# Patient Record
Sex: FEMALE | Race: WHITE | NOT HISPANIC OR LATINO | Employment: FULL TIME | URBAN - METROPOLITAN AREA
[De-identification: names, ages, dates, MRNs, and addresses within clinical notes are randomized per-mention and may not be internally consistent; named-entity substitution may affect disease eponyms.]

---

## 2019-03-26 ENCOUNTER — TRANSCRIBE ORDERS (OUTPATIENT)
Dept: ADMINISTRATIVE | Age: 24
End: 2019-03-26

## 2019-03-26 ENCOUNTER — OFFICE VISIT (OUTPATIENT)
Dept: OBGYN CLINIC | Facility: CLINIC | Age: 24
End: 2019-03-26
Payer: COMMERCIAL

## 2019-03-26 ENCOUNTER — APPOINTMENT (OUTPATIENT)
Dept: LAB | Age: 24
End: 2019-03-26
Payer: COMMERCIAL

## 2019-03-26 VITALS — BODY MASS INDEX: 30.22 KG/M2 | HEIGHT: 66 IN | WEIGHT: 188 LBS

## 2019-03-26 DIAGNOSIS — R19.7 DIARRHEA, UNSPECIFIED TYPE: Primary | ICD-10-CM

## 2019-03-26 DIAGNOSIS — K64.4 EXTERNAL HEMORRHOID: ICD-10-CM

## 2019-03-26 DIAGNOSIS — Z01.419 ENCOUNTER FOR WELL WOMAN EXAM: Primary | ICD-10-CM

## 2019-03-26 DIAGNOSIS — R19.5 CHANGE IN STOOL: ICD-10-CM

## 2019-03-26 DIAGNOSIS — R10.84 ABDOMINAL PAIN, GENERALIZED: ICD-10-CM

## 2019-03-26 DIAGNOSIS — Z11.3 SCREEN FOR STD (SEXUALLY TRANSMITTED DISEASE): ICD-10-CM

## 2019-03-26 DIAGNOSIS — Z01.419 CERVICAL SMEAR, AS PART OF ROUTINE GYNECOLOGICAL EXAMINATION: ICD-10-CM

## 2019-03-26 DIAGNOSIS — Z11.8 SPECIAL SCREENING EXAMINATION FOR CHLAMYDIAL DISEASE: ICD-10-CM

## 2019-03-26 DIAGNOSIS — R19.7 DIARRHEA, UNSPECIFIED TYPE: ICD-10-CM

## 2019-03-26 LAB
ALBUMIN SERPL BCP-MCNC: 3.1 G/DL (ref 3.5–5)
ALP SERPL-CCNC: 73 U/L (ref 46–116)
ALT SERPL W P-5'-P-CCNC: 16 U/L (ref 12–78)
ANION GAP SERPL CALCULATED.3IONS-SCNC: 6 MMOL/L (ref 4–13)
AST SERPL W P-5'-P-CCNC: 11 U/L (ref 5–45)
BASOPHILS # BLD AUTO: 0.06 THOUSANDS/ΜL (ref 0–0.1)
BASOPHILS NFR BLD AUTO: 1 % (ref 0–1)
BILIRUB SERPL-MCNC: 0.22 MG/DL (ref 0.2–1)
BUN SERPL-MCNC: 9 MG/DL (ref 5–25)
CALCIUM SERPL-MCNC: 8.3 MG/DL (ref 8.3–10.1)
CHLORIDE SERPL-SCNC: 109 MMOL/L (ref 100–108)
CO2 SERPL-SCNC: 25 MMOL/L (ref 21–32)
CREAT SERPL-MCNC: 0.93 MG/DL (ref 0.6–1.3)
EOSINOPHIL # BLD AUTO: 0.34 THOUSAND/ΜL (ref 0–0.61)
EOSINOPHIL NFR BLD AUTO: 4 % (ref 0–6)
ERYTHROCYTE [DISTWIDTH] IN BLOOD BY AUTOMATED COUNT: 12.9 % (ref 11.6–15.1)
GFR SERPL CREATININE-BSD FRML MDRD: 88 ML/MIN/1.73SQ M
GLUCOSE SERPL-MCNC: 116 MG/DL (ref 65–140)
HCT VFR BLD AUTO: 35.2 % (ref 34.8–46.1)
HGB BLD-MCNC: 11.7 G/DL (ref 11.5–15.4)
IMM GRANULOCYTES # BLD AUTO: 0.03 THOUSAND/UL (ref 0–0.2)
IMM GRANULOCYTES NFR BLD AUTO: 0 % (ref 0–2)
LYMPHOCYTES # BLD AUTO: 2.62 THOUSANDS/ΜL (ref 0.6–4.47)
LYMPHOCYTES NFR BLD AUTO: 33 % (ref 14–44)
MCH RBC QN AUTO: 28.7 PG (ref 26.8–34.3)
MCHC RBC AUTO-ENTMCNC: 33.2 G/DL (ref 31.4–37.4)
MCV RBC AUTO: 86 FL (ref 82–98)
MONOCYTES # BLD AUTO: 0.71 THOUSAND/ΜL (ref 0.17–1.22)
MONOCYTES NFR BLD AUTO: 9 % (ref 4–12)
NEUTROPHILS # BLD AUTO: 4.24 THOUSANDS/ΜL (ref 1.85–7.62)
NEUTS SEG NFR BLD AUTO: 53 % (ref 43–75)
NRBC BLD AUTO-RTO: 0 /100 WBCS
PLATELET # BLD AUTO: 386 THOUSANDS/UL (ref 149–390)
PMV BLD AUTO: 10 FL (ref 8.9–12.7)
POTASSIUM SERPL-SCNC: 3.9 MMOL/L (ref 3.5–5.3)
PROT SERPL-MCNC: 7.5 G/DL (ref 6.4–8.2)
RBC # BLD AUTO: 4.08 MILLION/UL (ref 3.81–5.12)
SODIUM SERPL-SCNC: 140 MMOL/L (ref 136–145)
TSH SERPL DL<=0.05 MIU/L-ACNC: 1.44 UIU/ML (ref 0.36–3.74)
WBC # BLD AUTO: 8 THOUSAND/UL (ref 4.31–10.16)

## 2019-03-26 PROCEDURE — 85025 COMPLETE CBC W/AUTO DIFF WBC: CPT

## 2019-03-26 PROCEDURE — 80053 COMPREHEN METABOLIC PANEL: CPT

## 2019-03-26 PROCEDURE — S0610 ANNUAL GYNECOLOGICAL EXAMINA: HCPCS | Performed by: PHYSICIAN ASSISTANT

## 2019-03-26 PROCEDURE — 36415 COLL VENOUS BLD VENIPUNCTURE: CPT

## 2019-03-26 PROCEDURE — 84443 ASSAY THYROID STIM HORMONE: CPT

## 2019-03-26 RX ORDER — DROSPIRENONE AND ETHINYL ESTRADIOL 0.02-3(28)
1 KIT ORAL DAILY
COMMUNITY
End: 2020-06-17 | Stop reason: HOSPADM

## 2019-03-26 NOTE — PROGRESS NOTES
Assessment/Plan:    No problem-specific Assessment & Plan notes found for this encounter  Diagnoses and all orders for this visit:    Encounter for well woman exam    Cervical smear, as part of routine gynecological examination  -     GP PAP (RFLX HPV PLUS WHEN ASCUS)    Screen for STD (sexually transmitted disease)  -     GP Chlamydia + Gonorrhea, Liquid-Based    Special screening examination for chlamydial disease  -     GP Chlamydia + Gonorrhea, Liquid-Based    External hemorrhoid    Change in stool    Other orders  -     drospirenone-ethinyl estradiol (OTTONIEL) 3-0 02 MG per tablet; Take 1 tablet by mouth daily        [facility]      Subjective:      Patient ID: Renée Martin is a 26 y o  female  Pt presents today for her yearly exam--  Her periods are regular, short and light on OCP  For the last 4--5 weeks, pt reports daily loose, stools, occasionally with blood  She denies any abdominal pain  She also denies any rectal pain or itching  She has not changed her diet  She has not been sick  No new medicines or vitamins  No fever/chills  She does report some increased stress with starting a new job  She reported no change in weight, but was 226lbs at her yearly OV last year  rec she start with a PCP for BW and imaging--possibly to GI from there  The following portions of the patient's history were reviewed and updated as appropriate: allergies, current medications, past family history, past medical history, past social history, past surgical history and problem list     Review of Systems   Constitutional: Negative for chills, fever and unexpected weight change  Gastrointestinal: Positive for blood in stool and diarrhea  Negative for constipation, nausea, rectal pain and vomiting  Genitourinary: Negative            Objective:      Ht 5' 6" (1 676 m)   Wt 85 3 kg (188 lb)   LMP 03/06/2019 (Exact Date)   BMI 30 34 kg/m²          Physical Exam   Constitutional: She appears well-developed and well-nourished  HENT:   Head: Normocephalic and atraumatic  Neck: Normal range of motion  Pulmonary/Chest: Right breast exhibits no inverted nipple, no mass, no nipple discharge and no skin change  Left breast exhibits no inverted nipple, no mass, no nipple discharge and no skin change  Abdominal: Soft  Genitourinary: Vagina normal and uterus normal  Rectal exam shows external hemorrhoid  Pelvic exam was performed with patient supine  There is no rash, tenderness or lesion on the right labia  There is no rash, tenderness or lesion on the left labia  Cervix exhibits no motion tenderness, no discharge and no friability  Right adnexum displays no mass, no tenderness and no fullness  Left adnexum displays no mass, no tenderness and no fullness  Lymphadenopathy: No inguinal adenopathy noted on the right or left side  Nursing note and vitals reviewed

## 2019-03-26 NOTE — PROGRESS NOTES
Pt is here for yearly exam  Pt has had some loose stool, with blood for the past 4 weeks  Pt doing well on generic Jesenia, regular cycles  Pt has no breast concerns  normal pap 2/13/18

## 2019-03-27 ENCOUNTER — APPOINTMENT (OUTPATIENT)
Dept: LAB | Age: 24
End: 2019-03-27
Payer: COMMERCIAL

## 2019-03-27 DIAGNOSIS — R19.7 DIARRHEA, UNSPECIFIED TYPE: ICD-10-CM

## 2019-03-27 DIAGNOSIS — R10.84 ABDOMINAL PAIN, GENERALIZED: ICD-10-CM

## 2019-03-27 PROCEDURE — 89055 LEUKOCYTE ASSESSMENT FECAL: CPT

## 2019-03-27 PROCEDURE — 87209 SMEAR COMPLEX STAIN: CPT

## 2019-03-27 PROCEDURE — 87177 OVA AND PARASITES SMEARS: CPT

## 2019-03-29 LAB — WBC SPEC QL GRAM STN: ABNORMAL

## 2019-04-01 LAB
DEPRECATED C TRACH RRNA XXX QL PRB: NOT DETECTED
N GONORRHOEA DNA UR QL NAA+PROBE: NOT DETECTED
O+P STL CONC: NORMAL
THIN PREP CVX: NORMAL

## 2019-04-02 ENCOUNTER — APPOINTMENT (OUTPATIENT)
Dept: LAB | Age: 24
End: 2019-04-02
Payer: COMMERCIAL

## 2019-04-02 DIAGNOSIS — R10.84 ABDOMINAL PAIN, GENERALIZED: ICD-10-CM

## 2019-04-02 DIAGNOSIS — R19.7 DIARRHEA, UNSPECIFIED TYPE: ICD-10-CM

## 2019-04-02 PROCEDURE — 87505 NFCT AGENT DETECTION GI: CPT

## 2019-04-03 LAB
CAMPYLOBACTER DNA SPEC NAA+PROBE: NORMAL
SALMONELLA DNA SPEC QL NAA+PROBE: NORMAL
SHIGA TOXIN STX GENE SPEC NAA+PROBE: NORMAL
SHIGELLA DNA SPEC QL NAA+PROBE: NORMAL

## 2019-04-22 ENCOUNTER — TRANSCRIBE ORDERS (OUTPATIENT)
Dept: ADMINISTRATIVE | Facility: HOSPITAL | Age: 24
End: 2019-04-22

## 2019-04-22 DIAGNOSIS — K52.9 CHRONIC DIARRHEA: Primary | ICD-10-CM

## 2019-05-03 DIAGNOSIS — Z30.9 CONTRACEPTION MANAGEMENT: Primary | ICD-10-CM

## 2019-05-03 RX ORDER — DROSPIRENONE AND ETHINYL ESTRADIOL 0.02-3(28)
1 KIT ORAL DAILY
Qty: 90 TABLET | Refills: 2 | Status: SHIPPED | OUTPATIENT
Start: 2019-05-03 | End: 2020-01-14 | Stop reason: SDUPTHER

## 2019-05-31 ENCOUNTER — TRANSCRIBE ORDERS (OUTPATIENT)
Dept: ADMINISTRATIVE | Facility: HOSPITAL | Age: 24
End: 2019-05-31

## 2019-05-31 ENCOUNTER — HOSPITAL ENCOUNTER (OUTPATIENT)
Dept: RADIOLOGY | Facility: HOSPITAL | Age: 24
Discharge: HOME | End: 2019-05-31
Attending: INTERNAL MEDICINE

## 2019-05-31 DIAGNOSIS — K52.9 CHRONIC DIARRHEA: ICD-10-CM

## 2019-06-12 ENCOUNTER — HOSPITAL ENCOUNTER (OUTPATIENT)
Dept: RADIOLOGY | Facility: HOSPITAL | Age: 24
Discharge: HOME | End: 2019-06-12
Attending: INTERNAL MEDICINE
Payer: COMMERCIAL

## 2019-06-12 DIAGNOSIS — K52.9 CHRONIC DIARRHEA: ICD-10-CM

## 2019-06-12 PROCEDURE — 74250 X-RAY XM SM INT 1CNTRST STD: CPT

## 2020-01-14 DIAGNOSIS — Z30.9 CONTRACEPTION MANAGEMENT: ICD-10-CM

## 2020-01-15 RX ORDER — DROSPIRENONE AND ETHINYL ESTRADIOL 0.02-3(28)
1 KIT ORAL DAILY
Qty: 90 TABLET | Refills: 0 | Status: SHIPPED | OUTPATIENT
Start: 2020-01-15 | End: 2020-06-17 | Stop reason: HOSPADM

## 2020-03-23 DIAGNOSIS — Z30.41 SURVEILLANCE FOR BIRTH CONTROL, ORAL CONTRACEPTIVES: Primary | ICD-10-CM

## 2020-03-23 RX ORDER — DROSPIRENONE AND ETHINYL ESTRADIOL 0.02-3(28)
1 KIT ORAL DAILY
Qty: 90 TABLET | Refills: 0 | Status: SHIPPED | OUTPATIENT
Start: 2020-03-23 | End: 2020-10-19 | Stop reason: SDUPTHER

## 2020-06-12 ENCOUNTER — APPOINTMENT (EMERGENCY)
Dept: CT IMAGING | Facility: HOSPITAL | Age: 25
DRG: 386 | End: 2020-06-12
Payer: COMMERCIAL

## 2020-06-12 ENCOUNTER — HOSPITAL ENCOUNTER (INPATIENT)
Facility: HOSPITAL | Age: 25
LOS: 5 days | Discharge: HOME | DRG: 386 | End: 2020-06-17
Attending: EMERGENCY MEDICINE | Admitting: INTERNAL MEDICINE
Payer: COMMERCIAL

## 2020-06-12 DIAGNOSIS — E87.1 HYPONATREMIA: ICD-10-CM

## 2020-06-12 DIAGNOSIS — D64.9 ANEMIA: ICD-10-CM

## 2020-06-12 DIAGNOSIS — R01.1 MURMUR: ICD-10-CM

## 2020-06-12 DIAGNOSIS — K50.119 CROHN'S DISEASE OF COLON WITH COMPLICATION (HCC): ICD-10-CM

## 2020-06-12 DIAGNOSIS — K52.9 ACUTE COLITIS: Primary | ICD-10-CM

## 2020-06-12 PROBLEM — D72.829 LEUCOCYTOSIS: Status: ACTIVE | Noted: 2020-06-12

## 2020-06-12 LAB
ALBUMIN SERPL BCP-MCNC: 2.5 G/DL (ref 3.5–5)
ALP SERPL-CCNC: 118 U/L (ref 46–116)
ALT SERPL W P-5'-P-CCNC: 16 U/L (ref 12–78)
ANION GAP SERPL CALCULATED.3IONS-SCNC: 10 MMOL/L (ref 4–13)
APTT PPP: 32 SECONDS (ref 23–37)
AST SERPL W P-5'-P-CCNC: 7 U/L (ref 5–45)
BASOPHILS # BLD MANUAL: 0 THOUSAND/UL (ref 0–0.1)
BASOPHILS NFR MAR MANUAL: 0 % (ref 0–1)
BILIRUB SERPL-MCNC: 0.4 MG/DL (ref 0.2–1)
BUN SERPL-MCNC: 9 MG/DL (ref 5–25)
CALCIUM SERPL-MCNC: 8.5 MG/DL (ref 8.3–10.1)
CHLORIDE SERPL-SCNC: 93 MMOL/L (ref 100–108)
CO2 SERPL-SCNC: 22 MMOL/L (ref 21–32)
CREAT SERPL-MCNC: 0.99 MG/DL (ref 0.6–1.3)
CRP SERPL QL: 244.8 MG/L
EOSINOPHIL # BLD MANUAL: 0 THOUSAND/UL (ref 0–0.4)
EOSINOPHIL NFR BLD MANUAL: 0 % (ref 0–6)
ERYTHROCYTE [DISTWIDTH] IN BLOOD BY AUTOMATED COUNT: 15.7 % (ref 11.6–15.1)
EXT PREG TEST URINE: NEGATIVE
EXT. CONTROL ED NAV: NORMAL
FERRITIN SERPL-MCNC: 111 NG/ML (ref 8–388)
FOLATE SERPL-MCNC: 11.1 NG/ML (ref 3.1–17.5)
GFR SERPL CREATININE-BSD FRML MDRD: 80 ML/MIN/1.73SQ M
GLUCOSE SERPL-MCNC: 121 MG/DL (ref 65–140)
HCT VFR BLD AUTO: 33.2 % (ref 34.8–46.1)
HGB BLD-MCNC: 10.4 G/DL (ref 11.5–15.4)
INR PPP: 1.28 (ref 0.84–1.19)
IRON SATN MFR SERPL: 7 %
IRON SERPL-MCNC: 18 UG/DL (ref 50–170)
LIPASE SERPL-CCNC: 651 U/L (ref 73–393)
LYMPHOCYTES # BLD AUTO: 1.99 THOUSAND/UL (ref 0.6–4.47)
LYMPHOCYTES # BLD AUTO: 17 % (ref 14–44)
MCH RBC QN AUTO: 21.1 PG (ref 26.8–34.3)
MCHC RBC AUTO-ENTMCNC: 31.3 G/DL (ref 31.4–37.4)
MCV RBC AUTO: 67 FL (ref 82–98)
METAMYELOCYTES NFR BLD MANUAL: 1 % (ref 0–1)
MONOCYTES # BLD AUTO: 0.7 THOUSAND/UL (ref 0–1.22)
MONOCYTES NFR BLD: 6 % (ref 4–12)
NEUTROPHILS # BLD MANUAL: 8.88 THOUSAND/UL (ref 1.85–7.62)
NEUTS SEG NFR BLD AUTO: 76 % (ref 43–75)
NRBC BLD AUTO-RTO: 0 /100 WBCS
PLATELET # BLD AUTO: 525 THOUSANDS/UL (ref 149–390)
PLATELET BLD QL SMEAR: ABNORMAL
PMV BLD AUTO: 8.8 FL (ref 8.9–12.7)
POTASSIUM SERPL-SCNC: 3.8 MMOL/L (ref 3.5–5.3)
PROT SERPL-MCNC: 8.1 G/DL (ref 6.4–8.2)
PROTHROMBIN TIME: 15.3 SECONDS (ref 11.6–14.5)
RBC # BLD AUTO: 4.93 MILLION/UL (ref 3.81–5.12)
SODIUM SERPL-SCNC: 125 MMOL/L (ref 136–145)
TIBC SERPL-MCNC: 241 UG/DL (ref 250–450)
TOTAL CELLS COUNTED SPEC: 100
VIT B12 SERPL-MCNC: 1009 PG/ML (ref 100–900)
WBC # BLD AUTO: 11.68 THOUSAND/UL (ref 4.31–10.16)

## 2020-06-12 PROCEDURE — 81025 URINE PREGNANCY TEST: CPT | Performed by: PHYSICIAN ASSISTANT

## 2020-06-12 PROCEDURE — 85007 BL SMEAR W/DIFF WBC COUNT: CPT | Performed by: PHYSICIAN ASSISTANT

## 2020-06-12 PROCEDURE — 80053 COMPREHEN METABOLIC PANEL: CPT | Performed by: PHYSICIAN ASSISTANT

## 2020-06-12 PROCEDURE — 82728 ASSAY OF FERRITIN: CPT | Performed by: INTERNAL MEDICINE

## 2020-06-12 PROCEDURE — 85610 PROTHROMBIN TIME: CPT | Performed by: PHYSICIAN ASSISTANT

## 2020-06-12 PROCEDURE — 83540 ASSAY OF IRON: CPT | Performed by: INTERNAL MEDICINE

## 2020-06-12 PROCEDURE — 86140 C-REACTIVE PROTEIN: CPT | Performed by: INTERNAL MEDICINE

## 2020-06-12 PROCEDURE — 82607 VITAMIN B-12: CPT | Performed by: INTERNAL MEDICINE

## 2020-06-12 PROCEDURE — 99285 EMERGENCY DEPT VISIT HI MDM: CPT

## 2020-06-12 PROCEDURE — 99285 EMERGENCY DEPT VISIT HI MDM: CPT | Performed by: PHYSICIAN ASSISTANT

## 2020-06-12 PROCEDURE — 99223 1ST HOSP IP/OBS HIGH 75: CPT | Performed by: INTERNAL MEDICINE

## 2020-06-12 PROCEDURE — 96360 HYDRATION IV INFUSION INIT: CPT

## 2020-06-12 PROCEDURE — 82746 ASSAY OF FOLIC ACID SERUM: CPT | Performed by: INTERNAL MEDICINE

## 2020-06-12 PROCEDURE — 83690 ASSAY OF LIPASE: CPT | Performed by: PHYSICIAN ASSISTANT

## 2020-06-12 PROCEDURE — 74177 CT ABD & PELVIS W/CONTRAST: CPT

## 2020-06-12 PROCEDURE — 83550 IRON BINDING TEST: CPT | Performed by: INTERNAL MEDICINE

## 2020-06-12 PROCEDURE — 36415 COLL VENOUS BLD VENIPUNCTURE: CPT | Performed by: PHYSICIAN ASSISTANT

## 2020-06-12 PROCEDURE — 85027 COMPLETE CBC AUTOMATED: CPT | Performed by: PHYSICIAN ASSISTANT

## 2020-06-12 PROCEDURE — 85730 THROMBOPLASTIN TIME PARTIAL: CPT | Performed by: PHYSICIAN ASSISTANT

## 2020-06-12 PROCEDURE — 87493 C DIFF AMPLIFIED PROBE: CPT | Performed by: INTERNAL MEDICINE

## 2020-06-12 RX ORDER — SODIUM CHLORIDE 9 MG/ML
75 INJECTION, SOLUTION INTRAVENOUS CONTINUOUS
Status: DISCONTINUED | OUTPATIENT
Start: 2020-06-12 | End: 2020-06-17 | Stop reason: HOSPADM

## 2020-06-12 RX ORDER — CALCIUM CARBONATE 200(500)MG
1000 TABLET,CHEWABLE ORAL DAILY PRN
Status: DISCONTINUED | OUTPATIENT
Start: 2020-06-12 | End: 2020-06-17 | Stop reason: HOSPADM

## 2020-06-12 RX ORDER — ONDANSETRON 2 MG/ML
4 INJECTION INTRAMUSCULAR; INTRAVENOUS EVERY 6 HOURS PRN
Status: DISCONTINUED | OUTPATIENT
Start: 2020-06-12 | End: 2020-06-17 | Stop reason: HOSPADM

## 2020-06-12 RX ORDER — DROSPIRENONE AND ETHINYL ESTRADIOL 0.02-3(28)
1 KIT ORAL DAILY
Status: DISCONTINUED | OUTPATIENT
Start: 2020-06-13 | End: 2020-06-17 | Stop reason: HOSPADM

## 2020-06-12 RX ORDER — ACETAMINOPHEN 325 MG/1
650 TABLET ORAL EVERY 6 HOURS PRN
Status: DISCONTINUED | OUTPATIENT
Start: 2020-06-12 | End: 2020-06-17 | Stop reason: HOSPADM

## 2020-06-12 RX ORDER — METHYLPREDNISOLONE SODIUM SUCCINATE 40 MG/ML
20 INJECTION, POWDER, LYOPHILIZED, FOR SOLUTION INTRAMUSCULAR; INTRAVENOUS EVERY 8 HOURS SCHEDULED
Status: DISCONTINUED | OUTPATIENT
Start: 2020-06-12 | End: 2020-06-17

## 2020-06-12 RX ORDER — DOCUSATE SODIUM 100 MG/1
100 CAPSULE, LIQUID FILLED ORAL 2 TIMES DAILY PRN
Status: DISCONTINUED | OUTPATIENT
Start: 2020-06-12 | End: 2020-06-14

## 2020-06-12 RX ADMIN — IOHEXOL 100 ML: 350 INJECTION, SOLUTION INTRAVENOUS at 16:40

## 2020-06-12 RX ADMIN — SODIUM CHLORIDE 125 ML/HR: 0.9 INJECTION, SOLUTION INTRAVENOUS at 19:59

## 2020-06-12 RX ADMIN — SODIUM CHLORIDE 1000 ML: 0.9 INJECTION, SOLUTION INTRAVENOUS at 16:16

## 2020-06-12 RX ADMIN — METHYLPREDNISOLONE SODIUM SUCCINATE 20 MG: 40 INJECTION, POWDER, FOR SOLUTION INTRAMUSCULAR; INTRAVENOUS at 20:00

## 2020-06-13 LAB
ANION GAP SERPL CALCULATED.3IONS-SCNC: 8 MMOL/L (ref 4–13)
BUN SERPL-MCNC: 4 MG/DL (ref 5–25)
CALCIUM SERPL-MCNC: 7.6 MG/DL (ref 8.3–10.1)
CHLORIDE SERPL-SCNC: 102 MMOL/L (ref 100–108)
CO2 SERPL-SCNC: 21 MMOL/L (ref 21–32)
CREAT SERPL-MCNC: 0.62 MG/DL (ref 0.6–1.3)
ERYTHROCYTE [DISTWIDTH] IN BLOOD BY AUTOMATED COUNT: 16 % (ref 11.6–15.1)
GFR SERPL CREATININE-BSD FRML MDRD: 127 ML/MIN/1.73SQ M
GLUCOSE SERPL-MCNC: 129 MG/DL (ref 65–140)
HCT VFR BLD AUTO: 24.2 % (ref 34.8–46.1)
HCT VFR BLD AUTO: 25 % (ref 34.8–46.1)
HCT VFR BLD AUTO: 25.5 % (ref 34.8–46.1)
HCT VFR BLD AUTO: 26.7 % (ref 34.8–46.1)
HGB BLD-MCNC: 7.7 G/DL (ref 11.5–15.4)
HGB BLD-MCNC: 7.7 G/DL (ref 11.5–15.4)
HGB BLD-MCNC: 8.1 G/DL (ref 11.5–15.4)
HGB BLD-MCNC: 8.3 G/DL (ref 11.5–15.4)
MAGNESIUM SERPL-MCNC: 2.2 MG/DL (ref 1.6–2.6)
MCH RBC QN AUTO: 21.2 PG (ref 26.8–34.3)
MCHC RBC AUTO-ENTMCNC: 30.8 G/DL (ref 31.4–37.4)
MCV RBC AUTO: 69 FL (ref 82–98)
PLATELET # BLD AUTO: 384 THOUSANDS/UL (ref 149–390)
PMV BLD AUTO: 8.8 FL (ref 8.9–12.7)
POTASSIUM SERPL-SCNC: 4.7 MMOL/L (ref 3.5–5.3)
RBC # BLD AUTO: 3.64 MILLION/UL (ref 3.81–5.12)
SODIUM SERPL-SCNC: 131 MMOL/L (ref 136–145)
WBC # BLD AUTO: 8.37 THOUSAND/UL (ref 4.31–10.16)

## 2020-06-13 PROCEDURE — 85027 COMPLETE CBC AUTOMATED: CPT | Performed by: INTERNAL MEDICINE

## 2020-06-13 PROCEDURE — 83735 ASSAY OF MAGNESIUM: CPT | Performed by: INTERNAL MEDICINE

## 2020-06-13 PROCEDURE — 87209 SMEAR COMPLEX STAIN: CPT | Performed by: PHYSICIAN ASSISTANT

## 2020-06-13 PROCEDURE — 99254 IP/OBS CNSLTJ NEW/EST MOD 60: CPT | Performed by: INTERNAL MEDICINE

## 2020-06-13 PROCEDURE — 87505 NFCT AGENT DETECTION GI: CPT | Performed by: PHYSICIAN ASSISTANT

## 2020-06-13 PROCEDURE — 87177 OVA AND PARASITES SMEARS: CPT | Performed by: PHYSICIAN ASSISTANT

## 2020-06-13 PROCEDURE — 85014 HEMATOCRIT: CPT | Performed by: PHYSICIAN ASSISTANT

## 2020-06-13 PROCEDURE — 85018 HEMOGLOBIN: CPT | Performed by: PHYSICIAN ASSISTANT

## 2020-06-13 PROCEDURE — 99232 SBSQ HOSP IP/OBS MODERATE 35: CPT | Performed by: INTERNAL MEDICINE

## 2020-06-13 PROCEDURE — 80048 BASIC METABOLIC PNL TOTAL CA: CPT | Performed by: INTERNAL MEDICINE

## 2020-06-13 RX ORDER — POLYETHYLENE GLYCOL 3350 17 G/17G
238 POWDER, FOR SOLUTION ORAL ONCE
Status: COMPLETED | OUTPATIENT
Start: 2020-06-14 | End: 2020-06-14

## 2020-06-13 RX ADMIN — SODIUM CHLORIDE 125 ML/HR: 0.9 INJECTION, SOLUTION INTRAVENOUS at 22:34

## 2020-06-13 RX ADMIN — METHYLPREDNISOLONE SODIUM SUCCINATE 20 MG: 40 INJECTION, POWDER, FOR SOLUTION INTRAMUSCULAR; INTRAVENOUS at 05:59

## 2020-06-13 RX ADMIN — METHYLPREDNISOLONE SODIUM SUCCINATE 20 MG: 40 INJECTION, POWDER, FOR SOLUTION INTRAMUSCULAR; INTRAVENOUS at 14:14

## 2020-06-13 RX ADMIN — IRON SUCROSE 200 MG: 20 INJECTION, SOLUTION INTRAVENOUS at 11:30

## 2020-06-13 RX ADMIN — METHYLPREDNISOLONE SODIUM SUCCINATE 20 MG: 40 INJECTION, POWDER, FOR SOLUTION INTRAMUSCULAR; INTRAVENOUS at 22:00

## 2020-06-13 RX ADMIN — DROSPIRENONE AND ETHINYL ESTRADIOL 1 TABLET: KIT at 22:13

## 2020-06-14 LAB
ANION GAP SERPL CALCULATED.3IONS-SCNC: 8 MMOL/L (ref 4–13)
BUN SERPL-MCNC: 6 MG/DL (ref 5–25)
CALCIUM SERPL-MCNC: 8 MG/DL (ref 8.3–10.1)
CAMPYLOBACTER DNA SPEC NAA+PROBE: NORMAL
CHLORIDE SERPL-SCNC: 102 MMOL/L (ref 100–108)
CO2 SERPL-SCNC: 24 MMOL/L (ref 21–32)
CREAT SERPL-MCNC: 0.73 MG/DL (ref 0.6–1.3)
GFR SERPL CREATININE-BSD FRML MDRD: 116 ML/MIN/1.73SQ M
GLUCOSE SERPL-MCNC: 123 MG/DL (ref 65–140)
HCT VFR BLD AUTO: 23.9 % (ref 34.8–46.1)
HCT VFR BLD AUTO: 25.9 % (ref 34.8–46.1)
HGB BLD-MCNC: 7.5 G/DL (ref 11.5–15.4)
HGB BLD-MCNC: 8.1 G/DL (ref 11.5–15.4)
POTASSIUM SERPL-SCNC: 4.5 MMOL/L (ref 3.5–5.3)
SALMONELLA DNA SPEC QL NAA+PROBE: NORMAL
SARS-COV-2 RNA RESP QL NAA+PROBE: NEGATIVE
SHIGA TOXIN STX GENE SPEC NAA+PROBE: NORMAL
SHIGELLA DNA SPEC QL NAA+PROBE: NORMAL
SODIUM SERPL-SCNC: 134 MMOL/L (ref 136–145)

## 2020-06-14 PROCEDURE — 99232 SBSQ HOSP IP/OBS MODERATE 35: CPT | Performed by: PHYSICIAN ASSISTANT

## 2020-06-14 PROCEDURE — 80048 BASIC METABOLIC PNL TOTAL CA: CPT | Performed by: PHYSICIAN ASSISTANT

## 2020-06-14 PROCEDURE — 99232 SBSQ HOSP IP/OBS MODERATE 35: CPT | Performed by: INTERNAL MEDICINE

## 2020-06-14 PROCEDURE — 87635 SARS-COV-2 COVID-19 AMP PRB: CPT | Performed by: PHYSICIAN ASSISTANT

## 2020-06-14 PROCEDURE — 85014 HEMATOCRIT: CPT | Performed by: PHYSICIAN ASSISTANT

## 2020-06-14 PROCEDURE — 85018 HEMOGLOBIN: CPT | Performed by: PHYSICIAN ASSISTANT

## 2020-06-14 RX ADMIN — SODIUM CHLORIDE 125 ML/HR: 0.9 INJECTION, SOLUTION INTRAVENOUS at 13:14

## 2020-06-14 RX ADMIN — DROSPIRENONE AND ETHINYL ESTRADIOL 1 TABLET: KIT at 21:08

## 2020-06-14 RX ADMIN — METHYLPREDNISOLONE SODIUM SUCCINATE 20 MG: 40 INJECTION, POWDER, FOR SOLUTION INTRAMUSCULAR; INTRAVENOUS at 15:33

## 2020-06-14 RX ADMIN — METHYLPREDNISOLONE SODIUM SUCCINATE 20 MG: 40 INJECTION, POWDER, FOR SOLUTION INTRAMUSCULAR; INTRAVENOUS at 05:57

## 2020-06-14 RX ADMIN — IRON SUCROSE 200 MG: 20 INJECTION, SOLUTION INTRAVENOUS at 17:10

## 2020-06-14 RX ADMIN — METHYLPREDNISOLONE SODIUM SUCCINATE 20 MG: 40 INJECTION, POWDER, FOR SOLUTION INTRAMUSCULAR; INTRAVENOUS at 21:07

## 2020-06-14 RX ADMIN — POLYETHYLENE GLYCOL 3350 238 G: 17 POWDER, FOR SOLUTION ORAL at 15:33

## 2020-06-14 RX ADMIN — SODIUM CHLORIDE 125 ML/HR: 0.9 INJECTION, SOLUTION INTRAVENOUS at 05:56

## 2020-06-15 ENCOUNTER — APPOINTMENT (INPATIENT)
Dept: GASTROENTEROLOGY | Facility: HOSPITAL | Age: 25
DRG: 386 | End: 2020-06-15
Payer: COMMERCIAL

## 2020-06-15 ENCOUNTER — ANESTHESIA EVENT (INPATIENT)
Dept: GASTROENTEROLOGY | Facility: HOSPITAL | Age: 25
DRG: 386 | End: 2020-06-15
Payer: COMMERCIAL

## 2020-06-15 ENCOUNTER — ANESTHESIA (INPATIENT)
Dept: GASTROENTEROLOGY | Facility: HOSPITAL | Age: 25
DRG: 386 | End: 2020-06-15
Payer: COMMERCIAL

## 2020-06-15 PROBLEM — D72.829 LEUCOCYTOSIS: Status: RESOLVED | Noted: 2020-06-12 | Resolved: 2020-06-15

## 2020-06-15 LAB
HCT VFR BLD AUTO: 24.5 % (ref 34.8–46.1)
HCT VFR BLD AUTO: 25.8 % (ref 34.8–46.1)
HGB BLD-MCNC: 7.6 G/DL (ref 11.5–15.4)
HGB BLD-MCNC: 8 G/DL (ref 11.5–15.4)

## 2020-06-15 PROCEDURE — 88342 IMHCHEM/IMCYTCHM 1ST ANTB: CPT | Performed by: PATHOLOGY

## 2020-06-15 PROCEDURE — 0DBB8ZX EXCISION OF ILEUM, VIA NATURAL OR ARTIFICIAL OPENING ENDOSCOPIC, DIAGNOSTIC: ICD-10-PCS | Performed by: INTERNAL MEDICINE

## 2020-06-15 PROCEDURE — 99232 SBSQ HOSP IP/OBS MODERATE 35: CPT | Performed by: NURSE PRACTITIONER

## 2020-06-15 PROCEDURE — 85018 HEMOGLOBIN: CPT | Performed by: INTERNAL MEDICINE

## 2020-06-15 PROCEDURE — 0DBN8ZX EXCISION OF SIGMOID COLON, VIA NATURAL OR ARTIFICIAL OPENING ENDOSCOPIC, DIAGNOSTIC: ICD-10-PCS | Performed by: INTERNAL MEDICINE

## 2020-06-15 PROCEDURE — 0DBP8ZX EXCISION OF RECTUM, VIA NATURAL OR ARTIFICIAL OPENING ENDOSCOPIC, DIAGNOSTIC: ICD-10-PCS | Performed by: INTERNAL MEDICINE

## 2020-06-15 PROCEDURE — 0DBM8ZX EXCISION OF DESCENDING COLON, VIA NATURAL OR ARTIFICIAL OPENING ENDOSCOPIC, DIAGNOSTIC: ICD-10-PCS | Performed by: INTERNAL MEDICINE

## 2020-06-15 PROCEDURE — 0DBK8ZX EXCISION OF ASCENDING COLON, VIA NATURAL OR ARTIFICIAL OPENING ENDOSCOPIC, DIAGNOSTIC: ICD-10-PCS | Performed by: INTERNAL MEDICINE

## 2020-06-15 PROCEDURE — 85014 HEMATOCRIT: CPT | Performed by: INTERNAL MEDICINE

## 2020-06-15 PROCEDURE — 85018 HEMOGLOBIN: CPT | Performed by: PHYSICIAN ASSISTANT

## 2020-06-15 PROCEDURE — 45380 COLONOSCOPY AND BIOPSY: CPT | Performed by: INTERNAL MEDICINE

## 2020-06-15 PROCEDURE — 85014 HEMATOCRIT: CPT | Performed by: PHYSICIAN ASSISTANT

## 2020-06-15 PROCEDURE — 88305 TISSUE EXAM BY PATHOLOGIST: CPT | Performed by: PATHOLOGY

## 2020-06-15 RX ORDER — ALPRAZOLAM 0.25 MG/1
0.25 TABLET ORAL ONCE
Status: COMPLETED | OUTPATIENT
Start: 2020-06-15 | End: 2020-06-15

## 2020-06-15 RX ORDER — SODIUM CHLORIDE 9 MG/ML
INJECTION, SOLUTION INTRAVENOUS CONTINUOUS PRN
Status: DISCONTINUED | OUTPATIENT
Start: 2020-06-15 | End: 2020-06-15 | Stop reason: SURG

## 2020-06-15 RX ORDER — PROPOFOL 10 MG/ML
INJECTION, EMULSION INTRAVENOUS AS NEEDED
Status: DISCONTINUED | OUTPATIENT
Start: 2020-06-15 | End: 2020-06-15 | Stop reason: SURG

## 2020-06-15 RX ORDER — ALPRAZOLAM 0.25 MG/1
0.25 TABLET ORAL ONCE AS NEEDED
Status: COMPLETED | OUTPATIENT
Start: 2020-06-15 | End: 2020-06-15

## 2020-06-15 RX ORDER — LIDOCAINE HYDROCHLORIDE 10 MG/ML
INJECTION, SOLUTION EPIDURAL; INFILTRATION; INTRACAUDAL; PERINEURAL AS NEEDED
Status: DISCONTINUED | OUTPATIENT
Start: 2020-06-15 | End: 2020-06-15 | Stop reason: SURG

## 2020-06-15 RX ORDER — PROPOFOL 10 MG/ML
INJECTION, EMULSION INTRAVENOUS CONTINUOUS PRN
Status: DISCONTINUED | OUTPATIENT
Start: 2020-06-15 | End: 2020-06-15 | Stop reason: SURG

## 2020-06-15 RX ADMIN — PROPOFOL 50 MG: 10 INJECTION, EMULSION INTRAVENOUS at 17:00

## 2020-06-15 RX ADMIN — SODIUM CHLORIDE: 0.9 INJECTION, SOLUTION INTRAVENOUS at 16:54

## 2020-06-15 RX ADMIN — ALPRAZOLAM 0.25 MG: 0.25 TABLET ORAL at 11:39

## 2020-06-15 RX ADMIN — PROPOFOL 100 MG: 10 INJECTION, EMULSION INTRAVENOUS at 16:57

## 2020-06-15 RX ADMIN — LIDOCAINE HYDROCHLORIDE 50 MG: 10 INJECTION, SOLUTION EPIDURAL; INFILTRATION; INTRACAUDAL; PERINEURAL at 16:57

## 2020-06-15 RX ADMIN — ALPRAZOLAM 0.25 MG: 0.25 TABLET ORAL at 22:41

## 2020-06-15 RX ADMIN — IRON SUCROSE 200 MG: 20 INJECTION, SOLUTION INTRAVENOUS at 14:18

## 2020-06-15 RX ADMIN — PROPOFOL 150 MCG/KG/MIN: 10 INJECTION, EMULSION INTRAVENOUS at 16:57

## 2020-06-15 RX ADMIN — METHYLPREDNISOLONE SODIUM SUCCINATE 20 MG: 40 INJECTION, POWDER, FOR SOLUTION INTRAMUSCULAR; INTRAVENOUS at 05:31

## 2020-06-15 RX ADMIN — METHYLPREDNISOLONE SODIUM SUCCINATE 20 MG: 40 INJECTION, POWDER, FOR SOLUTION INTRAMUSCULAR; INTRAVENOUS at 21:28

## 2020-06-15 RX ADMIN — SODIUM CHLORIDE 125 ML/HR: 0.9 INJECTION, SOLUTION INTRAVENOUS at 11:39

## 2020-06-15 RX ADMIN — PROPOFOL 20 MG: 10 INJECTION, EMULSION INTRAVENOUS at 17:03

## 2020-06-15 RX ADMIN — DROSPIRENONE AND ETHINYL ESTRADIOL 1 TABLET: KIT at 21:28

## 2020-06-15 RX ADMIN — METHYLPREDNISOLONE SODIUM SUCCINATE 20 MG: 40 INJECTION, POWDER, FOR SOLUTION INTRAMUSCULAR; INTRAVENOUS at 13:14

## 2020-06-16 LAB
ANION GAP SERPL CALCULATED.3IONS-SCNC: 9 MMOL/L (ref 4–13)
BUN SERPL-MCNC: 6 MG/DL (ref 5–25)
C DIFF TOX GENS STL QL NAA+PROBE: NEGATIVE
CALCIUM SERPL-MCNC: 8 MG/DL (ref 8.3–10.1)
CHLORIDE SERPL-SCNC: 103 MMOL/L (ref 100–108)
CO2 SERPL-SCNC: 23 MMOL/L (ref 21–32)
CREAT SERPL-MCNC: 0.66 MG/DL (ref 0.6–1.3)
ERYTHROCYTE [DISTWIDTH] IN BLOOD BY AUTOMATED COUNT: 16.4 % (ref 11.6–15.1)
GFR SERPL CREATININE-BSD FRML MDRD: 124 ML/MIN/1.73SQ M
GLUCOSE SERPL-MCNC: 112 MG/DL (ref 65–140)
HCT VFR BLD AUTO: 26.7 % (ref 34.8–46.1)
HGB BLD-MCNC: 8.1 G/DL (ref 11.5–15.4)
MCH RBC QN AUTO: 21.5 PG (ref 26.8–34.3)
MCHC RBC AUTO-ENTMCNC: 30.3 G/DL (ref 31.4–37.4)
MCV RBC AUTO: 71 FL (ref 82–98)
PLATELET # BLD AUTO: 396 THOUSANDS/UL (ref 149–390)
PMV BLD AUTO: 8.6 FL (ref 8.9–12.7)
POTASSIUM SERPL-SCNC: 3.9 MMOL/L (ref 3.5–5.3)
RBC # BLD AUTO: 3.76 MILLION/UL (ref 3.81–5.12)
SODIUM SERPL-SCNC: 135 MMOL/L (ref 136–145)
WBC # BLD AUTO: 10.09 THOUSAND/UL (ref 4.31–10.16)

## 2020-06-16 PROCEDURE — 99232 SBSQ HOSP IP/OBS MODERATE 35: CPT | Performed by: NURSE PRACTITIONER

## 2020-06-16 PROCEDURE — 85027 COMPLETE CBC AUTOMATED: CPT | Performed by: NURSE PRACTITIONER

## 2020-06-16 PROCEDURE — 80048 BASIC METABOLIC PNL TOTAL CA: CPT | Performed by: NURSE PRACTITIONER

## 2020-06-16 PROCEDURE — 99232 SBSQ HOSP IP/OBS MODERATE 35: CPT | Performed by: INTERNAL MEDICINE

## 2020-06-16 RX ADMIN — SODIUM CHLORIDE 75 ML/HR: 0.9 INJECTION, SOLUTION INTRAVENOUS at 17:45

## 2020-06-16 RX ADMIN — METHYLPREDNISOLONE SODIUM SUCCINATE 20 MG: 40 INJECTION, POWDER, FOR SOLUTION INTRAMUSCULAR; INTRAVENOUS at 21:06

## 2020-06-16 RX ADMIN — METHYLPREDNISOLONE SODIUM SUCCINATE 20 MG: 40 INJECTION, POWDER, FOR SOLUTION INTRAMUSCULAR; INTRAVENOUS at 14:39

## 2020-06-16 RX ADMIN — SODIUM CHLORIDE 125 ML/HR: 0.9 INJECTION, SOLUTION INTRAVENOUS at 00:44

## 2020-06-16 RX ADMIN — SODIUM CHLORIDE 125 ML/HR: 0.9 INJECTION, SOLUTION INTRAVENOUS at 09:21

## 2020-06-16 RX ADMIN — DROSPIRENONE AND ETHINYL ESTRADIOL 1 TABLET: KIT at 21:07

## 2020-06-16 RX ADMIN — METHYLPREDNISOLONE SODIUM SUCCINATE 20 MG: 40 INJECTION, POWDER, FOR SOLUTION INTRAMUSCULAR; INTRAVENOUS at 05:01

## 2020-06-17 ENCOUNTER — TRANSCRIBE ORDERS (OUTPATIENT)
Dept: LAB | Facility: CLINIC | Age: 25
End: 2020-06-17

## 2020-06-17 ENCOUNTER — TELEPHONE (OUTPATIENT)
Dept: GASTROENTEROLOGY | Facility: MEDICAL CENTER | Age: 25
End: 2020-06-17

## 2020-06-17 VITALS
WEIGHT: 153 LBS | OXYGEN SATURATION: 99 % | HEIGHT: 66 IN | DIASTOLIC BLOOD PRESSURE: 73 MMHG | BODY MASS INDEX: 24.59 KG/M2 | RESPIRATION RATE: 18 BRPM | TEMPERATURE: 98.7 F | HEART RATE: 71 BPM | SYSTOLIC BLOOD PRESSURE: 128 MMHG

## 2020-06-17 PROBLEM — R01.1 MURMUR: Status: ACTIVE | Noted: 2020-06-17

## 2020-06-17 LAB
ANION GAP SERPL CALCULATED.3IONS-SCNC: 8 MMOL/L (ref 4–13)
BUN SERPL-MCNC: 8 MG/DL (ref 5–25)
CALCIUM SERPL-MCNC: 7.9 MG/DL (ref 8.3–10.1)
CHLORIDE SERPL-SCNC: 101 MMOL/L (ref 100–108)
CO2 SERPL-SCNC: 26 MMOL/L (ref 21–32)
CREAT SERPL-MCNC: 0.6 MG/DL (ref 0.6–1.3)
ERYTHROCYTE [DISTWIDTH] IN BLOOD BY AUTOMATED COUNT: 16.9 % (ref 11.6–15.1)
GFR SERPL CREATININE-BSD FRML MDRD: 128 ML/MIN/1.73SQ M
GLUCOSE SERPL-MCNC: 112 MG/DL (ref 65–140)
HCT VFR BLD AUTO: 26.6 % (ref 34.8–46.1)
HGB BLD-MCNC: 8.2 G/DL (ref 11.5–15.4)
MCH RBC QN AUTO: 21.7 PG (ref 26.8–34.3)
MCHC RBC AUTO-ENTMCNC: 30.8 G/DL (ref 31.4–37.4)
MCV RBC AUTO: 70 FL (ref 82–98)
O+P STL CONC: NORMAL
PLATELET # BLD AUTO: 390 THOUSANDS/UL (ref 149–390)
PMV BLD AUTO: 8.2 FL (ref 8.9–12.7)
POTASSIUM SERPL-SCNC: 3.8 MMOL/L (ref 3.5–5.3)
RBC # BLD AUTO: 3.78 MILLION/UL (ref 3.81–5.12)
SODIUM SERPL-SCNC: 135 MMOL/L (ref 136–145)
WBC # BLD AUTO: 9.16 THOUSAND/UL (ref 4.31–10.16)

## 2020-06-17 PROCEDURE — 99239 HOSP IP/OBS DSCHRG MGMT >30: CPT | Performed by: PHYSICIAN ASSISTANT

## 2020-06-17 PROCEDURE — 85027 COMPLETE CBC AUTOMATED: CPT | Performed by: NURSE PRACTITIONER

## 2020-06-17 PROCEDURE — 80048 BASIC METABOLIC PNL TOTAL CA: CPT | Performed by: NURSE PRACTITIONER

## 2020-06-17 RX ORDER — PREDNISONE 10 MG/1
40 TABLET ORAL
Qty: 100 TABLET | Refills: 0 | Status: SHIPPED | OUTPATIENT
Start: 2020-06-17 | End: 2020-08-25 | Stop reason: ALTCHOICE

## 2020-06-17 RX ORDER — PREDNISONE 10 MG/1
40 TABLET ORAL
Qty: 100 TABLET | Refills: 0 | Status: SHIPPED | OUTPATIENT
Start: 2020-06-17 | End: 2020-06-17

## 2020-06-17 RX ORDER — PREDNISONE 20 MG/1
40 TABLET ORAL DAILY
Status: DISCONTINUED | OUTPATIENT
Start: 2020-06-17 | End: 2020-06-17 | Stop reason: HOSPADM

## 2020-06-17 RX ADMIN — SODIUM CHLORIDE 75 ML/HR: 0.9 INJECTION, SOLUTION INTRAVENOUS at 05:23

## 2020-06-17 RX ADMIN — PREDNISONE 40 MG: 20 TABLET ORAL at 12:05

## 2020-06-17 RX ADMIN — METHYLPREDNISOLONE SODIUM SUCCINATE 20 MG: 40 INJECTION, POWDER, FOR SOLUTION INTRAMUSCULAR; INTRAVENOUS at 05:14

## 2020-06-22 ENCOUNTER — ANNUAL EXAM (OUTPATIENT)
Dept: OBGYN CLINIC | Facility: CLINIC | Age: 25
End: 2020-06-22
Payer: COMMERCIAL

## 2020-06-22 VITALS
DIASTOLIC BLOOD PRESSURE: 76 MMHG | BODY MASS INDEX: 20.89 KG/M2 | SYSTOLIC BLOOD PRESSURE: 116 MMHG | WEIGHT: 130 LBS | HEIGHT: 66 IN

## 2020-06-22 DIAGNOSIS — Z12.39 ENCOUNTER FOR SCREENING BREAST EXAMINATION: ICD-10-CM

## 2020-06-22 DIAGNOSIS — Z01.419 CERVICAL SMEAR, AS PART OF ROUTINE GYNECOLOGICAL EXAMINATION: ICD-10-CM

## 2020-06-22 DIAGNOSIS — Z01.419 ENCOUNTER FOR WELL WOMAN EXAM: Primary | ICD-10-CM

## 2020-06-22 DIAGNOSIS — Z30.41 SURVEILLANCE FOR BIRTH CONTROL, ORAL CONTRACEPTIVES: ICD-10-CM

## 2020-06-22 PROCEDURE — S0612 ANNUAL GYNECOLOGICAL EXAMINA: HCPCS | Performed by: PHYSICIAN ASSISTANT

## 2020-06-22 RX ORDER — DROSPIRENONE AND ETHINYL ESTRADIOL 0.02-3(28)
1 KIT ORAL DAILY
Qty: 90 TABLET | Refills: 4 | Status: SHIPPED | OUTPATIENT
Start: 2020-06-22 | End: 2020-08-05 | Stop reason: SDUPTHER

## 2020-06-24 ENCOUNTER — APPOINTMENT (OUTPATIENT)
Dept: LAB | Facility: MEDICAL CENTER | Age: 25
End: 2020-06-24
Payer: COMMERCIAL

## 2020-06-24 DIAGNOSIS — K50.119 CROHN'S DISEASE OF COLON WITH COMPLICATION (HCC): ICD-10-CM

## 2020-06-24 LAB
ALBUMIN SERPL BCP-MCNC: 2.6 G/DL (ref 3.5–5)
ALP SERPL-CCNC: 131 U/L (ref 46–116)
ALT SERPL W P-5'-P-CCNC: 30 U/L (ref 12–78)
ANION GAP SERPL CALCULATED.3IONS-SCNC: 8 MMOL/L (ref 4–13)
AST SERPL W P-5'-P-CCNC: 13 U/L (ref 5–45)
BASOPHILS # BLD AUTO: 0.07 THOUSANDS/ΜL (ref 0–0.1)
BASOPHILS NFR BLD AUTO: 1 % (ref 0–1)
BILIRUB SERPL-MCNC: 0.29 MG/DL (ref 0.2–1)
BUN SERPL-MCNC: 12 MG/DL (ref 5–25)
CALCIUM SERPL-MCNC: 8.7 MG/DL (ref 8.3–10.1)
CHLORIDE SERPL-SCNC: 102 MMOL/L (ref 100–108)
CO2 SERPL-SCNC: 23 MMOL/L (ref 21–32)
CREAT SERPL-MCNC: 0.68 MG/DL (ref 0.6–1.3)
EOSINOPHIL # BLD AUTO: 0.26 THOUSAND/ΜL (ref 0–0.61)
EOSINOPHIL NFR BLD AUTO: 2 % (ref 0–6)
ERYTHROCYTE [DISTWIDTH] IN BLOOD BY AUTOMATED COUNT: 20.7 % (ref 11.6–15.1)
GFR SERPL CREATININE-BSD FRML MDRD: 123 ML/MIN/1.73SQ M
GLUCOSE P FAST SERPL-MCNC: 75 MG/DL (ref 65–99)
HCT VFR BLD AUTO: 31.4 % (ref 34.8–46.1)
HGB BLD-MCNC: 9.2 G/DL (ref 11.5–15.4)
IMM GRANULOCYTES # BLD AUTO: 0.23 THOUSAND/UL (ref 0–0.2)
IMM GRANULOCYTES NFR BLD AUTO: 2 % (ref 0–2)
LYMPHOCYTES # BLD AUTO: 5.04 THOUSANDS/ΜL (ref 0.6–4.47)
LYMPHOCYTES NFR BLD AUTO: 43 % (ref 14–44)
MCH RBC QN AUTO: 22.1 PG (ref 26.8–34.3)
MCHC RBC AUTO-ENTMCNC: 29.3 G/DL (ref 31.4–37.4)
MCV RBC AUTO: 76 FL (ref 82–98)
MONOCYTES # BLD AUTO: 0.97 THOUSAND/ΜL (ref 0.17–1.22)
MONOCYTES NFR BLD AUTO: 8 % (ref 4–12)
NEUTROPHILS # BLD AUTO: 5.09 THOUSANDS/ΜL (ref 1.85–7.62)
NEUTS SEG NFR BLD AUTO: 44 % (ref 43–75)
NRBC BLD AUTO-RTO: 0 /100 WBCS
PLATELET # BLD AUTO: 438 THOUSANDS/UL (ref 149–390)
PMV BLD AUTO: 8.9 FL (ref 8.9–12.7)
POTASSIUM SERPL-SCNC: 4.3 MMOL/L (ref 3.5–5.3)
PROT SERPL-MCNC: 7.5 G/DL (ref 6.4–8.2)
RBC # BLD AUTO: 4.16 MILLION/UL (ref 3.81–5.12)
SODIUM SERPL-SCNC: 133 MMOL/L (ref 136–145)
WBC # BLD AUTO: 11.66 THOUSAND/UL (ref 4.31–10.16)

## 2020-06-24 PROCEDURE — 85025 COMPLETE CBC W/AUTO DIFF WBC: CPT

## 2020-06-24 PROCEDURE — 82397 CHEMILUMINESCENT ASSAY: CPT

## 2020-06-24 PROCEDURE — 36415 COLL VENOUS BLD VENIPUNCTURE: CPT

## 2020-06-24 PROCEDURE — 80053 COMPREHEN METABOLIC PANEL: CPT

## 2020-06-24 PROCEDURE — 80145 DRUG ASSAY ADALIMUMAB: CPT

## 2020-06-25 LAB — THIN PREP CVX: ABNORMAL

## 2020-06-29 LAB
ADALIMUMAB AB SERPL-MCNC: <25 NG/ML
ADALIMUMAB SERPL-MCNC: 2.7 UG/ML

## 2020-07-02 ENCOUNTER — TELEMEDICINE (OUTPATIENT)
Dept: GASTROENTEROLOGY | Facility: MEDICAL CENTER | Age: 25
End: 2020-07-02
Payer: COMMERCIAL

## 2020-07-02 DIAGNOSIS — R15.2 FECAL URGENCY: ICD-10-CM

## 2020-07-02 DIAGNOSIS — R19.7 DIARRHEA, UNSPECIFIED TYPE: ICD-10-CM

## 2020-07-02 DIAGNOSIS — K50.119 CROHN'S DISEASE OF COLON WITH COMPLICATION (HCC): Primary | ICD-10-CM

## 2020-07-02 DIAGNOSIS — D50.9 IRON DEFICIENCY ANEMIA, UNSPECIFIED IRON DEFICIENCY ANEMIA TYPE: ICD-10-CM

## 2020-07-02 DIAGNOSIS — D84.9 IMMUNOCOMPROMISED STATE (HCC): ICD-10-CM

## 2020-07-02 PROCEDURE — 99245 OFF/OP CONSLTJ NEW/EST HI 55: CPT | Performed by: INTERNAL MEDICINE

## 2020-07-02 RX ORDER — FERROUS SULFATE TAB EC 324 MG (65 MG FE EQUIVALENT) 324 (65 FE) MG
324 TABLET DELAYED RESPONSE ORAL
Qty: 60 TABLET | Refills: 3 | Status: SHIPPED | OUTPATIENT
Start: 2020-07-02 | End: 2020-09-14 | Stop reason: SDUPTHER

## 2020-07-13 ENCOUNTER — PROCEDURE VISIT (OUTPATIENT)
Dept: OBGYN CLINIC | Facility: CLINIC | Age: 25
End: 2020-07-13
Payer: COMMERCIAL

## 2020-07-13 VITALS
WEIGHT: 166 LBS | DIASTOLIC BLOOD PRESSURE: 80 MMHG | SYSTOLIC BLOOD PRESSURE: 140 MMHG | HEIGHT: 68 IN | BODY MASS INDEX: 25.16 KG/M2 | TEMPERATURE: 98.2 F

## 2020-07-13 DIAGNOSIS — R87.611 ATYPICAL SQUAMOUS CELLS CANNOT EXCLUDE HIGH GRADE SQUAMOUS INTRAEPITHELIAL LESION ON CYTOLOGIC SMEAR OF CERVIX (ASC-H): Primary | ICD-10-CM

## 2020-07-13 PROCEDURE — 57456 ENDOCERV CURETTAGE W/SCOPE: CPT | Performed by: OBSTETRICS & GYNECOLOGY

## 2020-07-13 NOTE — PROGRESS NOTES
Colposcopy  Date/Time: 7/13/2020 2:41 PM  Performed by: Trevor Rose MD  Authorized by: Trevor Rose MD     Consent:     Consent obtained:  Written    Procedural risks discussed:  Bleeding    Patient questions answered: yes      Patient agrees, verbalizes understanding, and wants to proceed: yes      Educational handouts given: yes      Instructions and paperwork completed: yes    Pre-procedure:     Pre-procedure timeout performed: yes      Prepped with: acetic acid    Indication:     Indication:  ASC-H  Procedure:     Procedure: Colposcopy w/ endocervical curettage      Under satisfactory analgesia the patient was prepped and draped in the dorsal lithotomy position: no      Fabens speculum was placed in the vagina: yes      Under colposcopic examination the transition zone was seen in entirety: yes      Intracervical block was performed: no      Endocervix was curetted using a Kevorkian curette: yes      Tampon inserted: no      Monsel's solution was applied: yes      Biopsy(s): yes      Location:  [facility]    Specimen to pathology: yes    Post-procedure:     Findings: Bleeding      Impression comment:  No lesions seen on cervix or endocervical canal  Comments:      Patient had normal Pap smear 2018 and 19  Recent Pap smear showed ASCUS can not rule out high-grade lesion

## 2020-07-15 ENCOUNTER — APPOINTMENT (OUTPATIENT)
Dept: LAB | Facility: MEDICAL CENTER | Age: 25
End: 2020-07-15
Payer: COMMERCIAL

## 2020-07-15 DIAGNOSIS — K50.119 CROHN'S DISEASE OF COLON WITH COMPLICATION (HCC): ICD-10-CM

## 2020-07-15 LAB
25(OH)D3 SERPL-MCNC: 18.3 NG/ML (ref 30–100)
ALBUMIN SERPL BCP-MCNC: 3.1 G/DL (ref 3.5–5)
ALP SERPL-CCNC: 74 U/L (ref 46–116)
ALT SERPL W P-5'-P-CCNC: 16 U/L (ref 12–78)
ANION GAP SERPL CALCULATED.3IONS-SCNC: 7 MMOL/L (ref 4–13)
AST SERPL W P-5'-P-CCNC: <5 U/L (ref 5–45)
BASOPHILS # BLD AUTO: 0.04 THOUSANDS/ΜL (ref 0–0.1)
BASOPHILS NFR BLD AUTO: 0 % (ref 0–1)
BILIRUB SERPL-MCNC: 0.24 MG/DL (ref 0.2–1)
BUN SERPL-MCNC: 10 MG/DL (ref 5–25)
CALCIUM SERPL-MCNC: 9 MG/DL (ref 8.3–10.1)
CHLORIDE SERPL-SCNC: 102 MMOL/L (ref 100–108)
CO2 SERPL-SCNC: 25 MMOL/L (ref 21–32)
CREAT SERPL-MCNC: 0.66 MG/DL (ref 0.6–1.3)
CRP SERPL QL: 22.8 MG/L
EOSINOPHIL # BLD AUTO: 0.01 THOUSAND/ΜL (ref 0–0.61)
EOSINOPHIL NFR BLD AUTO: 0 % (ref 0–6)
ERYTHROCYTE [DISTWIDTH] IN BLOOD BY AUTOMATED COUNT: 22.8 % (ref 11.6–15.1)
GFR SERPL CREATININE-BSD FRML MDRD: 124 ML/MIN/1.73SQ M
GLUCOSE P FAST SERPL-MCNC: 88 MG/DL (ref 65–99)
HCT VFR BLD AUTO: 33.7 % (ref 34.8–46.1)
HGB BLD-MCNC: 10.5 G/DL (ref 11.5–15.4)
IMM GRANULOCYTES # BLD AUTO: 0.08 THOUSAND/UL (ref 0–0.2)
IMM GRANULOCYTES NFR BLD AUTO: 1 % (ref 0–2)
LYMPHOCYTES # BLD AUTO: 2.66 THOUSANDS/ΜL (ref 0.6–4.47)
LYMPHOCYTES NFR BLD AUTO: 24 % (ref 14–44)
MCH RBC QN AUTO: 24.5 PG (ref 26.8–34.3)
MCHC RBC AUTO-ENTMCNC: 31.2 G/DL (ref 31.4–37.4)
MCV RBC AUTO: 79 FL (ref 82–98)
MONOCYTES # BLD AUTO: 0.66 THOUSAND/ΜL (ref 0.17–1.22)
MONOCYTES NFR BLD AUTO: 6 % (ref 4–12)
NEUTROPHILS # BLD AUTO: 7.81 THOUSANDS/ΜL (ref 1.85–7.62)
NEUTS SEG NFR BLD AUTO: 69 % (ref 43–75)
NRBC BLD AUTO-RTO: 0 /100 WBCS
PLATELET # BLD AUTO: 402 THOUSANDS/UL (ref 149–390)
PMV BLD AUTO: 8.7 FL (ref 8.9–12.7)
POTASSIUM SERPL-SCNC: 4.3 MMOL/L (ref 3.5–5.3)
PROT SERPL-MCNC: 7.9 G/DL (ref 6.4–8.2)
RBC # BLD AUTO: 4.29 MILLION/UL (ref 3.81–5.12)
SODIUM SERPL-SCNC: 134 MMOL/L (ref 136–145)
WBC # BLD AUTO: 11.26 THOUSAND/UL (ref 4.31–10.16)

## 2020-07-15 PROCEDURE — 85025 COMPLETE CBC W/AUTO DIFF WBC: CPT

## 2020-07-15 PROCEDURE — 82306 VITAMIN D 25 HYDROXY: CPT

## 2020-07-15 PROCEDURE — 81401 MOPATH PROCEDURE LEVEL 2: CPT

## 2020-07-15 PROCEDURE — 82542 COL CHROMOTOGRAPHY QUAL/QUAN: CPT

## 2020-07-15 PROCEDURE — 86140 C-REACTIVE PROTEIN: CPT

## 2020-07-15 PROCEDURE — 80053 COMPREHEN METABOLIC PANEL: CPT

## 2020-07-15 PROCEDURE — 36415 COLL VENOUS BLD VENIPUNCTURE: CPT

## 2020-07-16 LAB
BIOPSY SPEC-IMP: NORMAL
BIOPSY SPEC-IMP: NORMAL

## 2020-07-20 LAB
REF LAB TEST METHOD: NORMAL
TEST INTERPRETATION: NORMAL
TPMT RBC-CCNC: 32.9 UNITS/ML RBC

## 2020-07-21 LAB
DIAGNOSTIC IMP SPEC-IMP: NORMAL
LABORATORY COMMENT REPORT: NORMAL
TPMT GENE MUT ANL BLD/T: NORMAL
TPMT GENE MUT ANL BLD/T: NORMAL

## 2020-07-31 DIAGNOSIS — K52.9 IBD (INFLAMMATORY BOWEL DISEASE): Primary | ICD-10-CM

## 2020-07-31 RX ORDER — AZATHIOPRINE 50 MG/1
50 TABLET ORAL DAILY
Qty: 30 TABLET | Refills: 3 | Status: SHIPPED | OUTPATIENT
Start: 2020-07-31 | End: 2020-08-27 | Stop reason: SDUPTHER

## 2020-08-05 ENCOUNTER — OFFICE VISIT (OUTPATIENT)
Dept: FAMILY MEDICINE CLINIC | Facility: CLINIC | Age: 25
End: 2020-08-05
Payer: COMMERCIAL

## 2020-08-05 VITALS
HEIGHT: 67 IN | BODY MASS INDEX: 26.68 KG/M2 | DIASTOLIC BLOOD PRESSURE: 70 MMHG | RESPIRATION RATE: 16 BRPM | OXYGEN SATURATION: 99 % | TEMPERATURE: 97.8 F | HEART RATE: 97 BPM | SYSTOLIC BLOOD PRESSURE: 140 MMHG | WEIGHT: 170 LBS

## 2020-08-05 DIAGNOSIS — F41.9 ANXIETY: ICD-10-CM

## 2020-08-05 DIAGNOSIS — D50.8 OTHER IRON DEFICIENCY ANEMIA: ICD-10-CM

## 2020-08-05 DIAGNOSIS — Z76.89 ENCOUNTER TO ESTABLISH CARE WITH NEW DOCTOR: Primary | ICD-10-CM

## 2020-08-05 DIAGNOSIS — K50.119 CROHN'S DISEASE OF COLON WITH COMPLICATION (HCC): ICD-10-CM

## 2020-08-05 DIAGNOSIS — R01.1 MURMUR: ICD-10-CM

## 2020-08-05 DIAGNOSIS — Z00.00 ROUTINE ADULT HEALTH MAINTENANCE: ICD-10-CM

## 2020-08-05 PROBLEM — D64.9 ANEMIA: Status: RESOLVED | Noted: 2020-06-12 | Resolved: 2020-08-05

## 2020-08-05 PROBLEM — E87.1 ACUTE HYPONATREMIA: Status: RESOLVED | Noted: 2020-06-12 | Resolved: 2020-08-05

## 2020-08-05 PROCEDURE — 1036F TOBACCO NON-USER: CPT | Performed by: FAMILY MEDICINE

## 2020-08-05 PROCEDURE — 99385 PREV VISIT NEW AGE 18-39: CPT | Performed by: FAMILY MEDICINE

## 2020-08-05 PROCEDURE — 3725F SCREEN DEPRESSION PERFORMED: CPT | Performed by: FAMILY MEDICINE

## 2020-08-05 RX ORDER — ESCITALOPRAM OXALATE 10 MG/1
TABLET ORAL
Qty: 90 TABLET | Refills: 1 | Status: SHIPPED | OUTPATIENT
Start: 2020-08-05 | End: 2021-01-14 | Stop reason: SDUPTHER

## 2020-08-05 NOTE — ASSESSMENT & PLAN NOTE
Faint heart murmur at right upper sternal border heard today, 1/6 in intensity, given it was heard previously and patient states it was louder in the past, we will get baseline echocardiogram

## 2020-08-05 NOTE — ASSESSMENT & PLAN NOTE
Immunizations up-to-date per patient, will get records from previous pediatrician  Patient states that she has completed HPV series  Cervical cancer screening:  Up-to-date, had abnormal Pap recently and subsequent colposcopy was within normal limits

## 2020-08-05 NOTE — ASSESSMENT & PLAN NOTE
Has established with GI  Currently on Humira, prednisone 10 mg, will be starting azathioprine 50 mg daily today and monitoring blood work every 1-2 weeks

## 2020-08-05 NOTE — ASSESSMENT & PLAN NOTE
Lab Results   Component Value Date    WBC 11 26 (H) 07/15/2020    HGB 10 5 (L) 07/15/2020    HCT 33 7 (L) 07/15/2020    MCV 79 (L) 07/15/2020     (H) 07/15/2020     Improved since hospitalization where she required IV Venofer, now compliant with over-the-counter iron supplements twice daily

## 2020-08-05 NOTE — ASSESSMENT & PLAN NOTE
Mild anxiety for the past year, reviewed treatment options including behavioral therapy and medications, patient would like to pursue both  Will start with low-dose Lexapro and titrate up to effective dose  Follow-up in 4-6 weeks  Reviewed common side effects including GI side effects given her Crohn's disease

## 2020-08-05 NOTE — PROGRESS NOTES
FAMILY MEDICINE NEW PATIENT NOTE  Lyndon Ambrocio 26 y o  female   DATE: August 5, 2020    ASSESSMENT and PLAN:  Lyndon Ambrocio is a 26 y o  female here to establish care with:     Problem List Items Addressed This Visit        Digestive    Crohn's disease of colon with complication ([address] )     Has established with GI  Currently on Humira, prednisone 10 mg, will be starting azathioprine 50 mg daily today and monitoring blood work every 1-2 weeks            Other    Absolute anemia     Lab Results   Component Value Date    WBC 11 26 (H) 07/15/2020    HGB 10 5 (L) 07/15/2020    HCT 33 7 (L) 07/15/2020    MCV 79 (L) 07/15/2020     (H) 07/15/2020     Improved since hospitalization where she required IV Venofer, now compliant with over-the-counter iron supplements twice daily         Murmur     Faint heart murmur at right upper sternal border heard today, 1/6 in intensity, given it was heard previously and patient states it was louder in the past, we will get baseline echocardiogram          Relevant Orders    Echo complete with contrast if indicated    Anxiety     Mild anxiety for the past year, reviewed treatment options including behavioral therapy and medications, patient would like to pursue both  Will start with low-dose Lexapro and titrate up to effective dose  Follow-up in 4-6 weeks  Reviewed common side effects including GI side effects given her Crohn's disease  Relevant Medications    escitalopram (LEXAPRO) 10 mg tablet    Routine adult health maintenance     Immunizations up-to-date per patient, will get records from previous pediatrician  Patient states that she has completed HPV series  Cervical cancer screening:  Up-to-date, had abnormal Pap recently and subsequent colposcopy was within normal limits             Other Visit Diagnoses     Encounter to establish care with new doctor    -  Primary          SUBJECTIVE:  Lyndon Ambrocio is a 26 y o  female who presents today with a chief complaint of Physical Exam  Pt is here to establish care  Previous PCP: Pediatrician    Social History: currently unemployed, previously was working as     PMH:  Crohn's disease-patient was admitted from June 12th to June 17, 2020 for abdominal pain and diarrhea CT scan showed pan colitis consistent with Crohn's disease flare-up  Patient was discharged on prednisone taper, now on 10mg  Patient has established outpatient with GI and is on Humira and will be started on azathioprine in addition  Anemia-baseline hemoglobin is in the 8-9 range, required Venofer during her most recent hospitalization  Most recent hemoglobin on July 15, 2029 was 10 5  Heart murmur- first heard at    Specialists:    Acute Concerns:  Anxiety- states for the past year she has had increased anxiety, can't fall asleep, over thinking, excess worrying, no OCD, mild depression symptoms since COVID19    Review of Systems   Constitutional: Negative for chills and fever  HENT: Negative for ear pain  Eyes: Negative for visual disturbance  Respiratory: Negative for cough and shortness of breath  Cardiovascular: Negative for chest pain and palpitations  Gastrointestinal: Negative for abdominal pain, diarrhea, nausea and vomiting  Musculoskeletal: Negative for arthralgias  Skin: Negative for rash  Neurological: Negative for headaches  Hematological: Does not bruise/bleed easily  Psychiatric/Behavioral: The patient is nervous/anxious  I have reviewed the patient's PMH, Surgical History, Family History, Social History, Medication List and Allergies        Histories Reviewed and Updated 8/5/2020:  Patient's Medications   New Prescriptions    ESCITALOPRAM (LEXAPRO) 10 MG TABLET    0 5 tab PO daily x 1 week, then 1 tab PO daily   Previous Medications    ADALIMUMAB (HUMIRA) 40 MG/0 8 ML PSKT    Inject 40 mg under the skin once a week    AZATHIOPRINE (IMURAN) 50 MG TABLET    Take 1 tablet (50 mg total) by mouth daily DROSPIRENONE-ETHINYL ESTRADIOL (OTTONIEL) 3-0 02 MG PER TABLET    Take 1 tablet by mouth daily    FERROUS SULFATE 324 (65 FE) MG    Take 1 tablet (324 mg total) by mouth 2 (two) times a day before meals    PREDNISONE 10 MG TABLET    Take 4 tablets (40 mg total) by mouth titrated Start at 40 mg daily and taper by 5 mg per week until off   Modified Medications    No medications on file   Discontinued Medications    DROSPIRENONE-ETHINYL ESTRADIOL (OTTONIEL) 3-0 02 MG PER TABLET    Take 1 tablet by mouth daily     Allergies   Allergen Reactions    Penicillins      Past Medical History:   Diagnosis Date    Crohn's colitis ([facility] )     Crohn's disease ([facility] )      No past surgical history on file    Social History     Socioeconomic History    Marital status: Single     Spouse name: Not on file    Number of children: Not on file    Years of education: Not on file    Highest education level: Not on file   Occupational History    Not on file   Social Needs    Financial resource strain: Not on file    Food insecurity     Worry: Not on file     Inability: Not on file    Transportation needs     Medical: Not on file     Non-medical: Not on file   Tobacco Use    Smoking status: Never Smoker    Smokeless tobacco: Never Used   Substance and Sexual Activity    Alcohol use: Yes     Frequency: Monthly or less     Comment: social    Drug use: Never    Sexual activity: Yes     Partners: Male     Birth control/protection: Pill   Lifestyle    Physical activity     Days per week: Not on file     Minutes per session: Not on file    Stress: Not on file   Relationships    Social connections     Talks on phone: Not on file     Gets together: Not on file     Attends Jewish service: Not on file     Active member of club or organization: Not on file     Attends meetings of clubs or organizations: Not on file     Relationship status: Not on file    Intimate partner violence     Fear of current or ex partner: Not on file     Emotionally abused: Not on file     Physically abused: Not on file     Forced sexual activity: Not on file   Other Topics Concern    Not on file   Social History Narrative    Not on file     No family history on file  Immunization History   Administered Date(s) Administered    INFLUENZA 10/27/2008, 09/26/2017    Influenza, injectable, quadrivalent, preservative free 0 5 mL 10/09/2019    Tdap 09/26/2017     OBJECTIVE:  /70   Pulse 97   Temp 97 8 °F (36 6 °C)   Resp 16   Ht 5' 7" (1 702 m)   Wt 77 1 kg (170 lb)   LMP 07/21/2020 (Approximate)   SpO2 99%   Breastfeeding No   BMI 26 63 kg/m²   Physical Exam   Constitutional: She is oriented to person, place, and time  She appears well-developed  No distress  HENT:   Head: Normocephalic and atraumatic  Mouth/Throat: No oropharyngeal exudate  Eyes: Pupils are equal, round, and reactive to light  Right eye exhibits no discharge  Left eye exhibits no discharge  Neck: Normal range of motion  Neck supple  No JVD present  Cardiovascular: Normal rate and regular rhythm  Murmur heard  Systolic murmur is present with a grade of 1/6  Pulmonary/Chest: Effort normal and breath sounds normal  No stridor  No respiratory distress  She has no wheezes  Abdominal: Soft  Bowel sounds are normal  There is no abdominal tenderness  There is no rebound and no guarding  Musculoskeletal: Normal range of motion  General: No tenderness  Neurological: She is alert and oriented to person, place, and time  Skin: Skin is warm and dry  She is not diaphoretic  No erythema  Psychiatric: Her behavior is normal    Vitals reviewed  [facility]   Mela Sanders MD

## 2020-08-20 ENCOUNTER — APPOINTMENT (OUTPATIENT)
Dept: LAB | Facility: MEDICAL CENTER | Age: 25
End: 2020-08-20
Payer: COMMERCIAL

## 2020-08-20 LAB
ALBUMIN SERPL BCP-MCNC: 3 G/DL (ref 3.5–5)
ALP SERPL-CCNC: 83 U/L (ref 46–116)
ALT SERPL W P-5'-P-CCNC: 14 U/L (ref 12–78)
ANION GAP SERPL CALCULATED.3IONS-SCNC: 4 MMOL/L (ref 4–13)
AST SERPL W P-5'-P-CCNC: 10 U/L (ref 5–45)
BASOPHILS # BLD AUTO: 0.05 THOUSANDS/ΜL (ref 0–0.1)
BASOPHILS NFR BLD AUTO: 1 % (ref 0–1)
BILIRUB SERPL-MCNC: 0.26 MG/DL (ref 0.2–1)
BUN SERPL-MCNC: 10 MG/DL (ref 5–25)
CALCIUM SERPL-MCNC: 8.8 MG/DL (ref 8.3–10.1)
CHLORIDE SERPL-SCNC: 110 MMOL/L (ref 100–108)
CO2 SERPL-SCNC: 24 MMOL/L (ref 21–32)
CREAT SERPL-MCNC: 0.84 MG/DL (ref 0.6–1.3)
CRP SERPL QL: 20.7 MG/L
EOSINOPHIL # BLD AUTO: 0.18 THOUSAND/ΜL (ref 0–0.61)
EOSINOPHIL NFR BLD AUTO: 2 % (ref 0–6)
ERYTHROCYTE [DISTWIDTH] IN BLOOD BY AUTOMATED COUNT: 16.9 % (ref 11.6–15.1)
GFR SERPL CREATININE-BSD FRML MDRD: 98 ML/MIN/1.73SQ M
GLUCOSE SERPL-MCNC: 91 MG/DL (ref 65–140)
HCT VFR BLD AUTO: 35.5 % (ref 34.8–46.1)
HGB BLD-MCNC: 11.1 G/DL (ref 11.5–15.4)
IMM GRANULOCYTES # BLD AUTO: 0.03 THOUSAND/UL (ref 0–0.2)
IMM GRANULOCYTES NFR BLD AUTO: 0 % (ref 0–2)
LYMPHOCYTES # BLD AUTO: 2.57 THOUSANDS/ΜL (ref 0.6–4.47)
LYMPHOCYTES NFR BLD AUTO: 31 % (ref 14–44)
MCH RBC QN AUTO: 26.4 PG (ref 26.8–34.3)
MCHC RBC AUTO-ENTMCNC: 31.3 G/DL (ref 31.4–37.4)
MCV RBC AUTO: 85 FL (ref 82–98)
MONOCYTES # BLD AUTO: 0.46 THOUSAND/ΜL (ref 0.17–1.22)
MONOCYTES NFR BLD AUTO: 6 % (ref 4–12)
NEUTROPHILS # BLD AUTO: 5.01 THOUSANDS/ΜL (ref 1.85–7.62)
NEUTS SEG NFR BLD AUTO: 60 % (ref 43–75)
NRBC BLD AUTO-RTO: 0 /100 WBCS
PLATELET # BLD AUTO: 377 THOUSANDS/UL (ref 149–390)
PMV BLD AUTO: 9.8 FL (ref 8.9–12.7)
POTASSIUM SERPL-SCNC: 4.3 MMOL/L (ref 3.5–5.3)
PROT SERPL-MCNC: 7.9 G/DL (ref 6.4–8.2)
RBC # BLD AUTO: 4.2 MILLION/UL (ref 3.81–5.12)
SODIUM SERPL-SCNC: 138 MMOL/L (ref 136–145)
WBC # BLD AUTO: 8.3 THOUSAND/UL (ref 4.31–10.16)

## 2020-08-20 PROCEDURE — 36415 COLL VENOUS BLD VENIPUNCTURE: CPT | Performed by: INTERNAL MEDICINE

## 2020-08-20 PROCEDURE — 80053 COMPREHEN METABOLIC PANEL: CPT | Performed by: INTERNAL MEDICINE

## 2020-08-20 PROCEDURE — 85025 COMPLETE CBC W/AUTO DIFF WBC: CPT | Performed by: INTERNAL MEDICINE

## 2020-08-20 PROCEDURE — 86140 C-REACTIVE PROTEIN: CPT | Performed by: INTERNAL MEDICINE

## 2020-08-25 ENCOUNTER — TELEPHONE (OUTPATIENT)
Dept: FAMILY MEDICINE CLINIC | Facility: CLINIC | Age: 25
End: 2020-08-25

## 2020-08-25 ENCOUNTER — OFFICE VISIT (OUTPATIENT)
Dept: FAMILY MEDICINE CLINIC | Facility: CLINIC | Age: 25
End: 2020-08-25
Payer: COMMERCIAL

## 2020-08-25 VITALS
DIASTOLIC BLOOD PRESSURE: 70 MMHG | HEART RATE: 84 BPM | TEMPERATURE: 98.5 F | HEIGHT: 66 IN | OXYGEN SATURATION: 99 % | RESPIRATION RATE: 16 BRPM | WEIGHT: 174 LBS | SYSTOLIC BLOOD PRESSURE: 110 MMHG | BODY MASS INDEX: 27.97 KG/M2

## 2020-08-25 DIAGNOSIS — L01.00 IMPETIGO: Primary | ICD-10-CM

## 2020-08-25 PROCEDURE — 3008F BODY MASS INDEX DOCD: CPT | Performed by: FAMILY MEDICINE

## 2020-08-25 PROCEDURE — 1036F TOBACCO NON-USER: CPT | Performed by: FAMILY MEDICINE

## 2020-08-25 PROCEDURE — 99213 OFFICE O/P EST LOW 20 MIN: CPT | Performed by: FAMILY MEDICINE

## 2020-08-25 NOTE — PROGRESS NOTES
FAMILY MEDICINE PROGRESS NOTE  Tg Man 26 y o  female   DATE: August 25, 2020     ASSESSMENT and PLAN:  Tg Man is a 26 y o  female with:     Problem List Items Addressed This Visit     None      Visit Diagnoses     Impetigo    -  Primary    Relevant Medications    mupirocin (BACTROBAN) 2 % ointment       Rash consistent with impetigo, reviewed supportive care, add Bactroban for 2 weeks  Avoid any other topical products  Patient agreeable with the plan and expressed understanding  I discucssed signs and symptoms for which to RTC, go to ER or seek urgent medical care  SUBJECTIVE:  Tg Man is a 26 y o  female who presents today with a chief complaint of Rash (mouth)  Patient presents for evaluation of rash around her mouth  Patient states she 1st noticed it in the last week  She denies any pain, redness, drainage from that area  She states she thinks it may be related to a mask that she wore that she did not wash, also she was taking care of her 3year-old nephew who had a similar rash around his mouth as well  Patient has not tried anything over-the-counter because she is unsure what is safety use  Review of Systems   Constitutional: Negative for fever  Respiratory: Negative for shortness of breath and stridor  Skin: Positive for rash  I have reviewed the patient's Past Medical History  OBJECTIVE:  /70   Pulse 84   Temp 98 5 °F (36 9 °C)   Resp 16   Ht 5' 6" (1 676 m)   Wt 78 9 kg (174 lb)   LMP 08/23/2020   SpO2 99%   BMI 28 08 kg/m²    Physical Exam  Vitals signs reviewed  HENT:      Head:     Neurological:      Mental Status: She is alert  Yue Samson MD    Note: Portions of the record have been created with voice recognition software  Occasional wrong word or "sound a like" substitutions may have occurred due to the inherent limitations of voice recognition software   Read the chart carefully and recognize, using context, where substitutions have occurred

## 2020-08-25 NOTE — TELEPHONE ENCOUNTER
Left msg to please call back      ----- Message from Abdon Bronson MD sent at 8/24/2020  8:29 AM EDT -----  Regarding: FW: Non-Urgent Medical Question  Contact: [phone]  She can try OTC Bactroban or Mupirocin, but ideally would recommend a virtual visit since on the pictures looks like it could be a cold sore as well? She may need something different depending on that  ----- Message -----  From: Bert Wright  Sent: 8/24/2020   8:03 AM EDT  To: Abdon Bronson MD  Subject: Pam Graft: Non-Urgent Medical Question                    ----- Message -----  From: Valentin Cynthia  Sent: 8/23/2020   5:42 PM EDT  To: [address] [place] [address] Clinical  Subject: Non-Urgent Medical Question                      Hi Dr Florentino Bronson! This rash seems to have appeared around my mouth  It went away for a little while but it is back and worse than before  Is there any suggestion for something I can put on it, or do I need to be seen for a prescription?     Thank you,   Danielle Prado

## 2020-08-25 NOTE — PATIENT INSTRUCTIONS
Impetigo   AMBULATORY CARE:   Impetigo  is a skin infection caused by bacteria  The infection can cause sores to form anywhere on your body  The sores develop watery or pus-filled blisters that break and form thick crusts  Impetigo is most common in children and spreads easily from person to person  Seek care immediately if:   · You have painful, red, warm skin around the blisters  · Your face is swollen  · You urinate less than usual or there is blood in your urine  Contact your healthcare provider if:   · You have a fever  · The sores become more red, swollen, warm, or tender  · The sores do not start to heal after 3 days of treatment  · You have questions or concerns about your condition or care  Treatment for impetigo  includes antibiotics to treat the bacterial infection  Antibiotics may be given as a pill or cream  Wash your skin and gently remove any crusts before you apply the antibiotic cream   Clean your sores safely:  Wash your skin sores with antibacterial soap and water  You may need to do this 2 to 3 times each day until the sores heal  If the area is crusted, gently wash the sores with gauze or a clean washcloth to remove the crust  Pat the area dry with a clean towel  Wash your hands, the washcloth, and the towel after you clean the area around the sores  Prevent the spread of impetigo:   · Avoid direct contact  You can spread impetigo if someone touches or uses something that touched your infected skin  You can also spread impetigo on your own body when you touch the area and then touch somewhere else  Keep the sores covered with gauze so you will not scratch or touch them  Keep your fingernails short  Your child may need to wear mittens so he does not scratch his sores  · Wash your hands often  Always wash your hands after you touch the infected area  Wash your hands before you touch food, your eyes, or other people   If no water is available, use an alcohol-based gel to clean your hands  · Wash household items  Do not share or reuse items that have come in contact with impetigo sores  Examples include bedding, towels, washcloths, and eating utensils  These items may be used again after they have been washed with hot water and soap  Return to work or school: You may return to work or school 48 hours after you start the antibiotic medicine  If your child has impetigo, tell his school or  center about the infection  Follow up with your healthcare provider as directed:  Write down your questions so you remember to ask them during your visits  © 2017 [employer] Information is for End User's use only and may not be sold, redistributed or otherwise used for commercial purposes  All illustrations and images included in CareNotes® are the copyrighted property of A D [employer] M [employer]  or [employer]  The above information is an  only  It is not intended as medical advice for individual conditions or treatments  Talk to your doctor, nurse or pharmacist before following any medical regimen to see if it is safe and effective for you

## 2020-08-27 DIAGNOSIS — K52.9 IBD (INFLAMMATORY BOWEL DISEASE): ICD-10-CM

## 2020-08-27 RX ORDER — AZATHIOPRINE 50 MG/1
50 TABLET ORAL DAILY
Qty: 90 TABLET | Refills: 2 | Status: SHIPPED | OUTPATIENT
Start: 2020-08-27 | End: 2021-03-11 | Stop reason: ALTCHOICE

## 2020-09-01 DIAGNOSIS — K50.119 CROHN'S DISEASE OF COLON WITH COMPLICATION (HCC): Primary | ICD-10-CM

## 2020-09-03 ENCOUNTER — APPOINTMENT (OUTPATIENT)
Dept: LAB | Facility: MEDICAL CENTER | Age: 25
End: 2020-09-03
Payer: COMMERCIAL

## 2020-09-14 DIAGNOSIS — K50.119 CROHN'S DISEASE OF COLON WITH COMPLICATION (HCC): ICD-10-CM

## 2020-09-15 RX ORDER — FERROUS SULFATE TAB EC 324 MG (65 MG FE EQUIVALENT) 324 (65 FE) MG
324 TABLET DELAYED RESPONSE ORAL
Qty: 60 TABLET | Refills: 0 | Status: SHIPPED | OUTPATIENT
Start: 2020-09-15 | End: 2022-02-01 | Stop reason: SDUPTHER

## 2020-09-30 ENCOUNTER — TELEPHONE (OUTPATIENT)
Dept: GASTROENTEROLOGY | Facility: CLINIC | Age: 25
End: 2020-09-30

## 2020-09-30 NOTE — TELEPHONE ENCOUNTER
Rusty Hernandez,    Can you confirm if her Humira is approved for two injections per week?     Thank you

## 2020-10-01 ENCOUNTER — APPOINTMENT (OUTPATIENT)
Dept: LAB | Facility: MEDICAL CENTER | Age: 25
End: 2020-10-01
Payer: COMMERCIAL

## 2020-10-18 ENCOUNTER — PATIENT MESSAGE (OUTPATIENT)
Dept: GASTROENTEROLOGY | Facility: MEDICAL CENTER | Age: 25
End: 2020-10-18

## 2020-10-19 DIAGNOSIS — Z30.41 SURVEILLANCE FOR BIRTH CONTROL, ORAL CONTRACEPTIVES: ICD-10-CM

## 2020-10-19 DIAGNOSIS — Z30.41 SURVEILLANCE FOR BIRTH CONTROL, ORAL CONTRACEPTIVES: Primary | ICD-10-CM

## 2020-10-19 RX ORDER — DROSPIRENONE AND ETHINYL ESTRADIOL 0.02-3(28)
1 KIT ORAL DAILY
Qty: 30 TABLET | Refills: 0 | Status: SHIPPED | OUTPATIENT
Start: 2020-10-19 | End: 2020-10-19 | Stop reason: SDUPTHER

## 2020-10-19 RX ORDER — DROSPIRENONE AND ETHINYL ESTRADIOL 0.02-3(28)
1 KIT ORAL DAILY
Qty: 30 TABLET | Refills: 0 | Status: SHIPPED | OUTPATIENT
Start: 2020-10-19 | End: 2021-03-11 | Stop reason: SDUPTHER

## 2020-10-19 RX ORDER — DROSPIRENONE AND ETHINYL ESTRADIOL 0.02-3(28)
1 KIT ORAL DAILY
Qty: 90 TABLET | Refills: 2 | Status: SHIPPED | OUTPATIENT
Start: 2020-10-19 | End: 2021-06-14

## 2020-10-30 ENCOUNTER — TELEPHONE (OUTPATIENT)
Dept: FAMILY MEDICINE CLINIC | Facility: CLINIC | Age: 25
End: 2020-10-30

## 2020-10-30 ENCOUNTER — HOSPITAL ENCOUNTER (OUTPATIENT)
Dept: NON INVASIVE DIAGNOSTICS | Facility: HOSPITAL | Age: 25
Discharge: HOME | End: 2020-10-30
Payer: COMMERCIAL

## 2020-10-30 DIAGNOSIS — R01.1 MURMUR: ICD-10-CM

## 2020-10-30 PROCEDURE — 93306 TTE W/DOPPLER COMPLETE: CPT | Performed by: INTERNAL MEDICINE

## 2020-10-30 PROCEDURE — 93306 TTE W/DOPPLER COMPLETE: CPT

## 2020-11-04 ENCOUNTER — TELEPHONE (OUTPATIENT)
Dept: GASTROENTEROLOGY | Facility: AMBULARY SURGERY CENTER | Age: 25
End: 2020-11-04

## 2020-11-04 ENCOUNTER — TELEPHONE (OUTPATIENT)
Dept: GASTROENTEROLOGY | Facility: CLINIC | Age: 25
End: 2020-11-04

## 2020-12-09 ENCOUNTER — TELEPHONE (OUTPATIENT)
Dept: GASTROENTEROLOGY | Facility: CLINIC | Age: 25
End: 2020-12-09

## 2021-01-14 DIAGNOSIS — F41.9 ANXIETY: ICD-10-CM

## 2021-01-14 RX ORDER — ESCITALOPRAM OXALATE 10 MG/1
10 TABLET ORAL DAILY
Qty: 90 TABLET | Refills: 1 | Status: SHIPPED | OUTPATIENT
Start: 2021-01-14 | End: 2021-04-07 | Stop reason: SDUPTHER

## 2021-01-22 ENCOUNTER — TELEPHONE (OUTPATIENT)
Dept: GASTROENTEROLOGY | Facility: CLINIC | Age: 26
End: 2021-01-22

## 2021-01-22 DIAGNOSIS — K50.119 CROHN'S DISEASE OF COLON WITH COMPLICATION (HCC): Primary | ICD-10-CM

## 2021-01-22 NOTE — TELEPHONE ENCOUNTER
----- Message from Ryan Ridley sent at 1/21/2021 12:37 PM EST -----  Regarding: FW: Non-Urgent Medical Question  Contact: [phone]    ----- Message -----  From: Jasson Frey  Sent: 1/21/2021  12:12 PM EST  To: , #  Subject: Non-Urgent Medical Question                      Hello! I have two questions, one regarding my prescription and one regarding the COVID-19 vaccine  I was hoping someone could call me regarding these matters   [phone]     Thank you,   Hubert Maloney

## 2021-01-22 NOTE — TELEPHONE ENCOUNTER
Patient aware of recommendation  She will resume humira today and have bw done day prior to next dose after on humira x 4 weeks

## 2021-01-22 NOTE — TELEPHONE ENCOUNTER
Patient has hx of crohn's on humira  Telemedicine visit July 2  We discussed, recommend COVID vaccine (I read her statement on email Dec 24 that we are recommending for all our IBD patients including those on biologic agents)  She said she is registered and will move forward with vaccine  She asked about dosing of humira  Humira was increased to twice weekly as recommended at visit on July 2 due to low drug levels  She said her last dose was early December due to insurance issues  She is reporting feeling overall well with bowel movements 2-3x/day  She received humira today; please provide recommendation on how to resume dosing and if repeat bw for drug level will be needed   Thank you

## 2021-01-22 NOTE — TELEPHONE ENCOUNTER
Hi,    I would like her to resume the Humira at the prior dosing if possible and then we can repeat a level after she has been on it for 4 weeks, with the level drawn the day before her infusion  At that time we can adjust down the dosing if needed       Thank you

## 2021-02-11 ENCOUNTER — TELEMEDICINE (OUTPATIENT)
Dept: FAMILY MEDICINE CLINIC | Facility: CLINIC | Age: 26
End: 2021-02-11
Payer: COMMERCIAL

## 2021-02-11 DIAGNOSIS — B34.9 VIRAL INFECTION, UNSPECIFIED: ICD-10-CM

## 2021-02-11 DIAGNOSIS — B34.9 VIRAL INFECTION, UNSPECIFIED: Primary | ICD-10-CM

## 2021-02-11 PROCEDURE — U0005 INFEC AGEN DETEC AMPLI PROBE: HCPCS | Performed by: FAMILY MEDICINE

## 2021-02-11 PROCEDURE — U0003 INFECTIOUS AGENT DETECTION BY NUCLEIC ACID (DNA OR RNA); SEVERE ACUTE RESPIRATORY SYNDROME CORONAVIRUS 2 (SARS-COV-2) (CORONAVIRUS DISEASE [COVID-19]), AMPLIFIED PROBE TECHNIQUE, MAKING USE OF HIGH THROUGHPUT TECHNOLOGIES AS DESCRIBED BY CMS-2020-01-R: HCPCS | Performed by: FAMILY MEDICINE

## 2021-02-11 PROCEDURE — 99214 OFFICE O/P EST MOD 30 MIN: CPT | Performed by: FAMILY MEDICINE

## 2021-02-11 NOTE — PROGRESS NOTES
COVID-19 Virtual Visit     Assessment/Plan:    Problem List Items Addressed This Visit     None      Visit Diagnoses     Viral infection, unspecified    -  Primary    Relevant Orders    Novel Coronavirus (Covid-19),PCR [facility] - Collected at Mobile V[facility] or [facility]         Disposition:     I referred patient to one of our COVID-19 Respiratory Clinics  Reviewed guidelines for quarantine in isolation for patient and household members  I have spent 11 minutes directly with the patient  Greater than 50% of this time was spent in counseling/coordination of care regarding: instructions for management and impressions  Encounter provider Nydia Arriaga MD    Provider located at [place] [address]  [phone]    Recent Visits  No visits were found meeting these conditions  Showing recent visits within past 7 days and meeting all other requirements     Today's Visits  Date Type Provider Dept   02/11/21 Telemedicine Nydia Arriaga MD  Gavi Lucero   Showing today's visits and meeting all other requirements     Future Appointments  No visits were found meeting these conditions  Showing future appointments within next 150 days and meeting all other requirements      This virtual check-in was done via [employer] and patient was informed that this is not a secure, HIPAA-compliant platform  She agrees to proceed  Patient agrees to participate in a virtual check in via telephone or video visit instead of presenting to the office to address urgent/immediate medical needs  Patient is aware this is a billable service  After connecting through [facility], the patient was identified by name and date of birth  Tamara Prieto was informed that this was a telemedicine visit and that the exam was being conducted confidentially over secure lines  My office door was closed  No one else was in the room   Tamara Prieto acknowledged consent and understanding of privacy and security of the telemedicine visit  I informed the patient that I have reviewed her record in Epic and presented the opportunity for her to ask any questions regarding the visit today  The patient agreed to participate  Subjective:   Andrea Palacios is a 26 y o  female who is concerned about COVID-19  Patient's symptoms include fatigue (at her baseline), nasal congestion, anosmia and loss of taste  Patient denies fever, chills, rhinorrhea, sore throat, cough, shortness of breath, chest tightness, abdominal pain, nausea, vomiting, diarrhea, myalgias and headaches  Date of symptom onset: 2/9/2021    Exposure:   Contact with a person who is under investigation (PUI) for or who is positive for COVID-19 within the last 14 days?: No    Hospitalized recently for fever and/or lower respiratory symptoms?: No      Currently a healthcare worker that is involved in direct patient care?: No      Works in a special setting where the risk of COVID-19 transmission may be high? (this may include long-term care, correctional and prison facilities; homeless shelters; assisted-living facilities and group homes ): No      Resident in a special setting where the risk of COVID-19 transmission may be high? (this may include long-term care, correctional and prison facilities; homeless shelters; assisted-living facilities and group homes ): No      Lab Results   Component Value Date    SARSCOV2 Negative 11/21/2020     Past Medical History:   Diagnosis Date    [facility]     [facility]'s disease ([facility] )      No past surgical history on file    Current Outpatient Medications   Medication Sig Dispense Refill    adalimumab (HUMIRA) 40 mg/0 8 mL PSKT Inject 1 6 mL (80 mg total) under the skin once a week 16 each 5    azaTHIOprine (IMURAN) 50 mg tablet Take 1 tablet (50 mg total) by mouth daily 90 tablet 2    drospirenone-ethinyl estradiol (OTTONIEL) 3-0 02 MG per tablet Take 1 tablet by mouth daily 90 tablet 2    drospirenone-ethinyl estradiol (OTTONIEL) 3-0 02 MG per tablet Take 1 tablet by mouth daily 30 tablet 0    escitalopram (LEXAPRO) 10 mg tablet Take 1 tablet (10 mg total) by mouth daily 90 tablet 1    ferrous sulfate 324 (65 Fe) mg Take 1 tablet (324 mg total) by mouth 2 (two) times a day before meals 60 tablet 0    mupirocin (BACTROBAN) 2 % ointment Apply topically 3 (three) times a day 22 g 0     No current facility-administered medications for this visit  Allergies   Allergen Reactions    Penicillins        Review of Systems   Constitutional: Positive for fatigue (at her baseline)  Negative for chills and fever  HENT: Positive for congestion  Negative for rhinorrhea and sore throat  Respiratory: Negative for cough, chest tightness and shortness of breath  Gastrointestinal: Negative for abdominal pain, diarrhea, nausea and vomiting  Musculoskeletal: Negative for myalgias  Neurological: Negative for headaches  Objective: There were no vitals filed for this visit  Physical Exam  Constitutional:       General: She is not in acute distress  Appearance: Normal appearance  She is normal weight  She is not ill-appearing or toxic-appearing  HENT:      Head: Normocephalic and atraumatic  Nose: Nose normal       Mouth/Throat:      Mouth: Mucous membranes are moist    Eyes:      Extraocular Movements: Extraocular movements intact  Conjunctiva/sclera: Conjunctivae normal    Neck:      Musculoskeletal: Normal range of motion and neck supple  Pulmonary:      Effort: Pulmonary effort is normal  No respiratory distress  Skin:     Findings: No erythema or rash  Neurological:      General: No focal deficit present  Mental Status: She is alert and oriented to person, place, and time     Psychiatric:         Mood and Affect: Mood normal          Behavior: Behavior normal        VIRTUAL VISIT DISCLAIMER    Sherif Sebastian acknowledges that she has consented to an online visit or consultation  She understands that the online visit is based solely on information provided by her, and that, in the absence of a face-to-face physical evaluation by the physician, the diagnosis she receives is both limited and provisional in terms of accuracy and completeness  This is not intended to replace a full medical face-to-face evaluation by the physician  Rosi Wynn understands and accepts these terms

## 2021-02-12 LAB — SARS-COV-2 RNA RESP QL NAA+PROBE: POSITIVE

## 2021-03-10 DIAGNOSIS — Z23 ENCOUNTER FOR IMMUNIZATION: ICD-10-CM

## 2021-03-11 ENCOUNTER — OFFICE VISIT (OUTPATIENT)
Dept: FAMILY MEDICINE CLINIC | Facility: CLINIC | Age: 26
End: 2021-03-11
Payer: COMMERCIAL

## 2021-03-11 VITALS
HEART RATE: 80 BPM | TEMPERATURE: 98 F | RESPIRATION RATE: 14 BRPM | SYSTOLIC BLOOD PRESSURE: 118 MMHG | WEIGHT: 182 LBS | HEIGHT: 66 IN | DIASTOLIC BLOOD PRESSURE: 72 MMHG | BODY MASS INDEX: 29.25 KG/M2

## 2021-03-11 DIAGNOSIS — T75.3XXA SEVERE MOTION SICKNESS, INITIAL ENCOUNTER: Primary | ICD-10-CM

## 2021-03-11 DIAGNOSIS — R53.83 OTHER FATIGUE: ICD-10-CM

## 2021-03-11 PROCEDURE — 1036F TOBACCO NON-USER: CPT | Performed by: FAMILY MEDICINE

## 2021-03-11 PROCEDURE — 99213 OFFICE O/P EST LOW 20 MIN: CPT | Performed by: FAMILY MEDICINE

## 2021-03-11 PROCEDURE — 3008F BODY MASS INDEX DOCD: CPT | Performed by: FAMILY MEDICINE

## 2021-03-11 RX ORDER — ONDANSETRON 4 MG/1
4 TABLET, ORALLY DISINTEGRATING ORAL EVERY 6 HOURS PRN
Qty: 20 TABLET | Refills: 0 | Status: SHIPPED | OUTPATIENT
Start: 2021-03-11 | End: 2021-07-01 | Stop reason: SDUPTHER

## 2021-03-11 RX ORDER — SCOLOPAMINE TRANSDERMAL SYSTEM 1 MG/1
1 PATCH, EXTENDED RELEASE TRANSDERMAL
Qty: 10 PATCH | Refills: 1 | Status: SHIPPED | OUTPATIENT
Start: 2021-03-11 | End: 2021-07-01 | Stop reason: SDUPTHER

## 2021-03-11 NOTE — PROGRESS NOTES
FAMILY MEDICINE PROGRESS NOTE    Date of Service: 21  Primary Care Provider:   Jerri Hernandez MD       Name: Josiane Mijares       : 1995       Age:25 y o  Sex: female      MRN: [id number]      Chief Complaint:Nausea       ASSESSMENT and PLAN:  Josiane Mijares is a 26 y o  female with:     Problem List Items Addressed This Visit     None      Visit Diagnoses     Severe motion sickness, initial encounter    -  Primary    Relevant Medications    scopolamine (TRANSDERM-SCOP) 1 5 mg/3 days TD 72 hr patch    ondansetron (ZOFRAN-ODT) 4 mg disintegrating tablet    Other fatigue        Relevant Orders    TSH, 3rd generation with Free T4 reflex        Patient with recent worsening of motion sickness even while in the front seat  Counseled on behavioral modifications  Will send prescription for transderm-scop to use for long trips, Zofran as needed  Asked patient to see if symptoms worsen during certain times of menstrual cycle  May need to change OCP  Unsure if Humira might be cause of worsening motion sickness  Continue with symptomatic treatment  SUBJECTIVE:  Josiane Mijares is a 26 y o  female who presents today with a chief complaint of Nausea  HPI     She reports worsening symptoms of motion sickness  She cannot ride roller coasters, she needs medications on boats, airplanes  She has tried scopolamine when on a cruise  She now also gets motion sick when she is a passenger in the car  Review of Systems   Gastrointestinal: Positive for nausea  Neurological: Positive for dizziness (motion sickness)  I have reviewed the patient's Past Medical History      Current Outpatient Medications:     adalimumab (HUMIRA) 40 mg/0 8 mL PSKT, Inject 1 6 mL (80 mg total) under the skin once a week, Disp: 16 each, Rfl: 5    drospirenone-ethinyl estradiol (OTTONIEL) 3-0 02 MG per tablet, Take 1 tablet by mouth daily, Disp: 90 tablet, Rfl: 2    escitalopram (LEXAPRO) 10 mg tablet, Take 1 tablet (10 mg total) by mouth daily, Disp: 90 tablet, Rfl: 1    ferrous sulfate 324 (65 Fe) mg, Take 1 tablet (324 mg total) by mouth 2 (two) times a day before meals, Disp: 60 tablet, Rfl: 0    mupirocin (BACTROBAN) 2 % ointment, Apply topically 3 (three) times a day, Disp: 22 g, Rfl: 0    ondansetron (ZOFRAN-ODT) 4 mg disintegrating tablet, Take 1 tablet (4 mg total) by mouth every 6 (six) hours as needed for nausea or vomiting, Disp: 20 tablet, Rfl: 0    scopolamine (TRANSDERM-SCOP) 1 5 mg/3 days TD 72 hr patch, Place 1 patch on the skin every third day, Disp: 10 patch, Rfl: 1    OBJECTIVE:  /72   Pulse 80   Temp 98 °F (36 7 °C)   Resp 14   Ht 5' 6" (1 676 m)   Wt 82 6 kg (182 lb)   LMP 03/07/2021 (Exact Date)   BMI 29 38 kg/m²    BP Readings from Last 3 Encounters:   03/11/21 118/72   08/25/20 110/70   08/05/20 140/70      Wt Readings from Last 3 Encounters:   03/11/21 82 6 kg (182 lb)   08/25/20 78 9 kg (174 lb)   08/05/20 77 1 kg (170 lb)      Physical Exam  Constitutional:       General: She is not in acute distress  Appearance: Normal appearance  She is normal weight  She is not ill-appearing or toxic-appearing  HENT:      Head: Normocephalic and atraumatic  Right Ear: External ear normal       Left Ear: External ear normal       Nose: Nose normal       Mouth/Throat:      Mouth: Mucous membranes are moist    Eyes:      Extraocular Movements: Extraocular movements intact  Conjunctiva/sclera: Conjunctivae normal       Pupils: Pupils are equal, round, and reactive to light  Neck:      Musculoskeletal: Normal range of motion and neck supple  Cardiovascular:      Rate and Rhythm: Normal rate and regular rhythm  Pulses: Normal pulses  Heart sounds: Normal heart sounds  No murmur  No friction rub  No gallop  Pulmonary:      Effort: Pulmonary effort is normal  No respiratory distress  Breath sounds: Normal breath sounds  Musculoskeletal: Normal range of motion        Right lower leg: No edema  Left lower leg: No edema  Skin:     Findings: No erythema or rash  Neurological:      General: No focal deficit present  Mental Status: She is alert and oriented to person, place, and time  Comments: Negative Export-Hallpike   Psychiatric:         Mood and Affect: Mood normal          Behavior: Behavior normal                 Return if symptoms worsen or fail to improve  Uzma Mathew MD    Note: Portions of the record have been created with voice recognition software  Occasional wrong word or "sound a like" substitutions may have occurred due to the inherent limitations of voice recognition software  Read the chart carefully and recognize, using context, where substitutions have occurred

## 2021-03-11 NOTE — PATIENT INSTRUCTIONS
Motion Sickness   WHAT YOU NEED TO KNOW:   Motion sickness happens when the motion you see is different from the motion you feel  Your eyes, muscles, joints, and inner ears sense motion and send signals to your brain  When these signals are different, motion sickness occurs  DISCHARGE INSTRUCTIONS:   Medicines:   · Medicines  can help prevent or treat motion sickness  · Take your medicine as directed  Contact your healthcare provider if you think your medicine is not helping or if you have side effects  Tell him of her if you are allergic to any medicine  Keep a list of the medicines, vitamins, and herbs you take  Include the amounts, and when and why you take them  Bring the list or the pill bottles to follow-up visits  Carry your medicine list with you in case of an emergency  Prevent motion sickness:   · Know where to sit when you travel  You may have fewer symptoms if you are the  when you travel by car  If you are a passenger, try to sit in the front seat  Sit in the middle of a plane or boat  Do not face backward  · Increase fresh air around your face  Avoid smoke, fumes, and odors  · Recline slightly  and fix your eyes on still, distant objects  Keep your head still and rest it against a head rest     · Do not read  or play video games while you travel  · Do not have alcohol or caffeine , and do not eat a large meal before you travel  Follow up with your healthcare provider as directed:  Write down your questions so you remember to ask them during your visits  Contact your healthcare provider if:   · You have a headache and a dry, sticky mouth  · You are not urinating as much as usual     · You have questions or concerns about your condition or care  Return to the emergency department if:   · You are confused and have fast, shallow breathing  · You feel like you are going to faint  · You have severe anxiety or panic      © Copyright [employer] 2020 Information is for End User's use only and may not be sold, redistributed or otherwise used for commercial purposes  All illustrations and images included in CareNotes® are the copyrighted property of A D A M , Inc  or Ethel Hurley  The above information is an  only  It is not intended as medical advice for individual conditions or treatments  Talk to your doctor, nurse or pharmacist before following any medical regimen to see if it is safe and effective for you

## 2021-04-07 ENCOUNTER — TELEPHONE (OUTPATIENT)
Dept: FAMILY MEDICINE CLINIC | Facility: CLINIC | Age: 26
End: 2021-04-07

## 2021-04-07 DIAGNOSIS — F41.9 ANXIETY: ICD-10-CM

## 2021-04-07 RX ORDER — ESCITALOPRAM OXALATE 20 MG/1
20 TABLET ORAL DAILY
Qty: 90 TABLET | Refills: 1 | Status: SHIPPED | OUTPATIENT
Start: 2021-04-07 | End: 2021-04-14 | Stop reason: SDUPTHER

## 2021-04-07 NOTE — TELEPHONE ENCOUNTER
Patient called asking if her Lexapro can be increase or is there other options for her  Started waking up again in the middle of the night thinking about work and feeling anxious  Please advise   She would like a return call

## 2021-04-07 NOTE — TELEPHONE ENCOUNTER
Please inform patient that she can increase dose of Lexapro to 20 mg daily  Does she need refill? It might take 2 or more weeks for her to have noticeable effect  If symptoms do not improve in 4-6 weeks please have her schedule a follow-up       Thank you

## 2021-04-14 DIAGNOSIS — F41.9 ANXIETY: ICD-10-CM

## 2021-04-14 RX ORDER — ESCITALOPRAM OXALATE 20 MG/1
20 TABLET ORAL DAILY
Qty: 90 TABLET | Refills: 1 | Status: SHIPPED | OUTPATIENT
Start: 2021-04-14 | End: 2021-11-01 | Stop reason: SDUPTHER

## 2021-04-30 NOTE — PROGRESS NOTES
Patient: Aubree Gallego [[id number]]     Procedure: OPEN ACCESS COLONOSCOPY Status: Needs Scheduling   Requested appt date:  Authorizing: Gonzalo Verma MD in [facility]           Priority: Routine               Diagnosis: Encounter for screening for malignant neoplasm of colon [Z12.11]          Virtual Regular Visit      Assessment/Plan:  The patient is a 27-year-old woman with a history of ileocolonic Crohn's disease diagnosed approximately 1 year ago, with recent hospitalization for flare for which she was treated with prednisone, who now presents to establish care  After the patient was diagnosed with Crohn's 1 year ago she was started on Humira and in March her dose was increased to weekly injections  During her recent flare she did have a repeat you merit checked and it showed that her level is still low and has not been optimized despite the weekly injections  She has improved with the prednisone and currently her symptoms are mild to moderate, were as before there were moderate to severe  She also has anemia likely from chronic blood loss related to her disease  She also has hypoalbuminemia consistent with a protein-losing enteropathy  Available labs and imaging reviewed  Problem List Items Addressed This Visit        Digestive    Crohn's disease of colon with complication ([facility] ) - Primary    Relevant Medications    ferrous sulfate 324 (65 Fe) mg    Other Relevant Orders    TPMT GENOTYPING,BLOOD    TPMT ENZYME ACTIVITY,BLOOD    CBC and differential    Comprehensive metabolic panel    C-reactive protein    Vitamin D 25 hydroxy       Other    Anemia    Relevant Medications    ferrous sulfate 324 (65 Fe) mg      Other Visit Diagnoses     Immunocompromised state ([facility] )        Diarrhea, unspecified type        Fecal urgency            Pre Humira needs to be increased to twice a week injections to try and achieve a level of 7 5 or greater  We will reach out to her insurance to get approval   We will need to check a Humira level again when she has been on a stable dose of twice a week injections  Should we not be able to get this approved we will have to switch her to Remicade instead and would ideally start her at a higher dose or more frequent interval dosing    To boost her levels we will also plan to start low-dose Imuran at approximately 50 mg per day  We discussed the risks of Imuran including immunocompromised state, lymphoma, skin cancer, intolerance, pancytopenia, liver toxicity, as well as others  Prior to this we will check a TPMT enzyme activity and genotype  Once we start the Imuran she will need a weekly CBC and CMP for 1 month followed by biweekly blood work for another month  Continue prednisone with plan to taper by 5 mg per day daily dosing every week  At this time we will also repeat her CBC, CMP, CRP  We will check a vitamin-D level and supplement if low  I prescribed oral iron supplementation to be taken twice a day  Routine PAP smears and follow-up with gynecology given atypical cells seen on recent Pap smear  I recommended routine dermatologic exams given increased risk of skin cancer with Crohn's as well as with anti TNF agents  Routine dental and optho exams  Yearly flu shot  Avoid live vaccines  Pneumovax and Prevar-13  Shingrix    Follow-up in 4-6 weeks       Reason for visit is   Chief Complaint   Patient presents with    Virtual Regular Visit        Encounter provider Az Francis MD    Provider located at [address] [address]      Recent Visits  No visits were found meeting these conditions  Showing recent visits within past 7 days and meeting all other requirements     Today's Visits  Date Type Provider Dept   07/02/20 Telemedicine MD Mathieu Lorenzo [place]   Showing today's visits and meeting all other requirements     Future Appointments  No visits were found meeting these conditions  Showing future appointments within next 150 days and meeting all other requirements        The patient was identified by name and date of birth   Beatris Salgado was informed that this is a telemedicine visit and that the visit is being conducted through FaceTime and patient was informed that this is not a secure, HIPAA-complaint platform  She agrees to proceed     My office door was closed  No one else was in the room  She acknowledged consent and understanding of privacy and security of the video platform  The patient has agreed to participate and understands they can discontinue the visit at any time  Patient is aware this is a billable service  Reffered by Dr Jeison Lawrence for Crohn's    Subjective  Lore Cohen is a 26 y o  female with Crohn's disease who presents to [facility]  She has a history of Crohn's disease diagnosed in August of 2019 (but with symptoms in March 2019), and she has been on Humira since that time  She had a flare December 2019, treated with prednisone and she felt better  She had recent increase to weekly dosing (March 2019)  Towards the end of May she had developed bloody diarrhea, abdominal cramps, gassiness and she had reached out to her prior gastroenterologist who started her on a prednisone taper, however she did not have improvement  She switched to budesonide without relief  At that time she had gone to the hospital for evaluation and she was started on IV steroids, with eventual transition to oral steroids  She also underwent a colonoscopy while in the hospital (results below)  She tapered down to 30mg of prednisone  6 BMs per 24 hours period, with 2 nocturnal BMs (it was worse before)  The stools are loose (like wet sand)  No BRBPR or black stools  Occasional urgency  She had incontinence at the beginning of this flare  No abdominal pain  No rashes, mouth sores (she had this last summer), joint pains  No reflux, dysphagia, odynophagia, nausea, vomiting  In the past month she lost 25 lbs, she gained 5 lbs since discharge  No fevers or chills but she had 2 fevers in the beginning of her flares  No FH of IBD  She has some nighttime BMs     PAP smear last week    Humira level 2 7 with no ADA    Cscope (6/15/2020): Moderate to severe pan colitis with deep ulcers, loss of vascularity and diffuse erythema  Biopsies were obtained to assess for CMV  No pseudomembranes were seen  Terminal ileum was intubated with few pseudopolyps within the distal portion  No ulcers/erosions were seen in the TI  Path:  A  Terminal Ileum, biopsy:  - Patchy, chronic active ileitis  - Negative for dysplasia or carcinoma       B  Ascending colon, biopsy:  - Diffuse, moderate to severe chronic active colitis  - Negative for dysplasia or carcinoma  - CMV stain is pending       C  Descending colon, biopsy:  - Diffuse, moderate to severe chronic active colitis  - Negative for dysplasia or carcinoma  - CMV stain is pending       D  Sigmoid colon, biopsy:  - Diffuse, mild to moderate chronic active colitis  - Negative for dysplasia or carcinoma      E  Rectum, biopsy:  - Diffuse, moderate chronic active colitis  - Negative for dysplasia or carcinoma  Past Medical History:   Diagnosis Date    [facility]'s colitis ([facility] )     Crohn's disease ([place] [address] )        No past surgical history on file  Current Outpatient Medications   Medication Sig Dispense Refill    adalimumab (HUMIRA) 40 mg/0 8 mL PSKT Inject 40 mg under the skin once a week      drospirenone-ethinyl estradiol (OTTONIEL) 3-0 02 MG per tablet Take 1 tablet by mouth daily 90 tablet 0    drospirenone-ethinyl estradiol (OTTONIEL) 3-0 02 MG per tablet Take 1 tablet by mouth daily 90 tablet 4    ferrous sulfate 324 (65 Fe) mg Take 1 tablet (324 mg total) by mouth 2 (two) times a day before meals 60 tablet 3    predniSONE 10 mg tablet Take 4 tablets (40 mg total) by mouth titrated Start at 40 mg daily and taper by 5 mg per week until off 100 tablet 0     No current facility-administered medications for this visit  Allergies   Allergen Reactions    Penicillins      REVIEW OF SYSTEMS:    CONSTITUTIONAL: Denies any fever, chills, rigors, and weight loss    HEENT: No earache or tinnitus  Denies hearing loss or visual disturbances  CARDIOVASCULAR: No chest pain or palpitations  RESPIRATORY: Denies any cough, hemoptysis, shortness of breath or dyspnea on exertion  GASTROINTESTINAL: As noted in the History of Present Illness  GENITOURINARY: No problems with urination  Denies any hematuria or dysuria  NEUROLOGIC: No dizziness or vertigo, denies headaches  MUSCULOSKELETAL: Denies any muscle or joint pain  SKIN: Denies skin rashes or itching  ENDOCRINE: Denies excessive thirst  Denies intolerance to heat or cold  PSYCHOSOCIAL: Denies depression or anxiety  Denies any recent memory loss  PHYSICAL EXAMINATION:  Appearance and vitals taken from home devices    General Appearance:   Alert, cooperative, no distress   HEENT:  Normocephalic, atraumatic, anicteric  Neck supple, symmetrical, trachea midline  Lungs:   Equal chest rise and unlabored breathing, normal effort, no coughing  Cardiovascular:   No visualized JVD  Abdomen:   No abdominal distension  Skin:   No jaundice, rashes, or lesions  Musculoskeletal:   Normal range of motion visualized  Psych:  Normal affect and normal insight  Neuro:  Alert and appropriate  As a result of this visit, I have not referred the patient for further respiratory evaluation  I spent 25 minutes directly with the patient during this visit      [address] acknowledges that she has consented to an online visit or consultation  She understands that the online visit is based solely on information provided by her, and that, in the absence of a face-to-face physical evaluation by the physician, the diagnosis she receives is both limited and provisional in terms of accuracy and completeness  This is not intended to replace a full medical face-to-face evaluation by the physician  Mandy Vuong understands and accepts these terms

## 2021-05-17 DIAGNOSIS — K50.119 CROHN'S DISEASE OF COLON WITH COMPLICATION (HCC): Primary | ICD-10-CM

## 2021-06-06 DIAGNOSIS — Z30.41 SURVEILLANCE FOR BIRTH CONTROL, ORAL CONTRACEPTIVES: ICD-10-CM

## 2021-06-14 RX ORDER — DROSPIRENONE AND ETHINYL ESTRADIOL 0.02-3(28)
KIT ORAL
Qty: 30 TABLET | Refills: 0 | Status: SHIPPED | OUTPATIENT
Start: 2021-06-14 | End: 2021-07-01 | Stop reason: SDUPTHER

## 2021-06-28 ENCOUNTER — TELEPHONE (OUTPATIENT)
Dept: GASTROENTEROLOGY | Facility: MEDICAL CENTER | Age: 26
End: 2021-06-28

## 2021-06-28 ENCOUNTER — HOSPITAL ENCOUNTER (EMERGENCY)
Facility: HOSPITAL | Age: 26
Discharge: HOME | End: 2021-06-29
Attending: EMERGENCY MEDICINE
Payer: COMMERCIAL

## 2021-06-28 VITALS
DIASTOLIC BLOOD PRESSURE: 101 MMHG | OXYGEN SATURATION: 96 % | HEART RATE: 105 BPM | RESPIRATION RATE: 20 BRPM | SYSTOLIC BLOOD PRESSURE: 139 MMHG | TEMPERATURE: 100.1 F

## 2021-06-28 DIAGNOSIS — K50.90 CROHN'S DISEASE (HCC): Primary | ICD-10-CM

## 2021-06-28 DIAGNOSIS — K50.119 CROHN'S DISEASE OF COLON WITH COMPLICATION (HCC): Primary | ICD-10-CM

## 2021-06-28 LAB
ALBUMIN SERPL BCP-MCNC: 2.8 G/DL (ref 3.5–5)
ALP SERPL-CCNC: 142 U/L (ref 46–116)
ALT SERPL W P-5'-P-CCNC: 18 U/L (ref 12–78)
ANION GAP SERPL CALCULATED.3IONS-SCNC: 8 MMOL/L (ref 4–13)
AST SERPL W P-5'-P-CCNC: 34 U/L (ref 5–45)
BASOPHILS # BLD AUTO: 0.04 THOUSANDS/ΜL (ref 0–0.1)
BASOPHILS NFR BLD AUTO: 1 % (ref 0–1)
BILIRUB SERPL-MCNC: 0.44 MG/DL (ref 0.2–1)
BUN SERPL-MCNC: 9 MG/DL (ref 5–25)
CALCIUM ALBUM COR SERPL-MCNC: 9.3 MG/DL (ref 8.3–10.1)
CALCIUM SERPL-MCNC: 8.3 MG/DL (ref 8.3–10.1)
CHLORIDE SERPL-SCNC: 105 MMOL/L (ref 100–108)
CO2 SERPL-SCNC: 23 MMOL/L (ref 21–32)
CREAT SERPL-MCNC: 0.79 MG/DL (ref 0.6–1.3)
CRP SERPL QL: 82.4 MG/L
EOSINOPHIL # BLD AUTO: 0.45 THOUSAND/ΜL (ref 0–0.61)
EOSINOPHIL NFR BLD AUTO: 6 % (ref 0–6)
ERYTHROCYTE [DISTWIDTH] IN BLOOD BY AUTOMATED COUNT: 12.5 % (ref 11.6–15.1)
ERYTHROCYTE [SEDIMENTATION RATE] IN BLOOD: 24 MM/HOUR (ref 0–19)
GFR SERPL CREATININE-BSD FRML MDRD: 104 ML/MIN/1.73SQ M
GLUCOSE SERPL-MCNC: 82 MG/DL (ref 65–140)
HCT VFR BLD AUTO: 32.6 % (ref 34.8–46.1)
HGB BLD-MCNC: 11 G/DL (ref 11.5–15.4)
IMM GRANULOCYTES # BLD AUTO: 0.02 THOUSAND/UL (ref 0–0.2)
IMM GRANULOCYTES NFR BLD AUTO: 0 % (ref 0–2)
LIPASE SERPL-CCNC: 52 U/L (ref 73–393)
LYMPHOCYTES # BLD AUTO: 2.9 THOUSANDS/ΜL (ref 0.6–4.47)
LYMPHOCYTES NFR BLD AUTO: 41 % (ref 14–44)
MCH RBC QN AUTO: 28.1 PG (ref 26.8–34.3)
MCHC RBC AUTO-ENTMCNC: 33.7 G/DL (ref 31.4–37.4)
MCV RBC AUTO: 83 FL (ref 82–98)
MONOCYTES # BLD AUTO: 0.7 THOUSAND/ΜL (ref 0.17–1.22)
MONOCYTES NFR BLD AUTO: 10 % (ref 4–12)
NEUTROPHILS # BLD AUTO: 2.97 THOUSANDS/ΜL (ref 1.85–7.62)
NEUTS SEG NFR BLD AUTO: 42 % (ref 43–75)
NRBC BLD AUTO-RTO: 0 /100 WBCS
PLATELET # BLD AUTO: 301 THOUSANDS/UL (ref 149–390)
PMV BLD AUTO: 8.9 FL (ref 8.9–12.7)
POTASSIUM SERPL-SCNC: 3.7 MMOL/L (ref 3.5–5.3)
PROT SERPL-MCNC: 7.9 G/DL (ref 6.4–8.2)
RBC # BLD AUTO: 3.92 MILLION/UL (ref 3.81–5.12)
SODIUM SERPL-SCNC: 136 MMOL/L (ref 136–145)
WBC # BLD AUTO: 7.08 THOUSAND/UL (ref 4.31–10.16)

## 2021-06-28 PROCEDURE — 80053 COMPREHEN METABOLIC PANEL: CPT | Performed by: EMERGENCY MEDICINE

## 2021-06-28 PROCEDURE — 36415 COLL VENOUS BLD VENIPUNCTURE: CPT | Performed by: EMERGENCY MEDICINE

## 2021-06-28 PROCEDURE — 83690 ASSAY OF LIPASE: CPT | Performed by: EMERGENCY MEDICINE

## 2021-06-28 PROCEDURE — 86140 C-REACTIVE PROTEIN: CPT | Performed by: EMERGENCY MEDICINE

## 2021-06-28 PROCEDURE — 99284 EMERGENCY DEPT VISIT MOD MDM: CPT | Performed by: EMERGENCY MEDICINE

## 2021-06-28 PROCEDURE — 99284 EMERGENCY DEPT VISIT MOD MDM: CPT

## 2021-06-28 PROCEDURE — 85025 COMPLETE CBC W/AUTO DIFF WBC: CPT | Performed by: EMERGENCY MEDICINE

## 2021-06-28 PROCEDURE — 96360 HYDRATION IV INFUSION INIT: CPT

## 2021-06-28 PROCEDURE — 85652 RBC SED RATE AUTOMATED: CPT | Performed by: EMERGENCY MEDICINE

## 2021-06-28 RX ORDER — PREDNISONE 10 MG/1
TABLET ORAL
Qty: 126 TABLET | Refills: 0 | Status: SHIPPED | OUTPATIENT
Start: 2021-06-28 | End: 2021-08-23

## 2021-06-28 RX ORDER — ACETAMINOPHEN 325 MG/1
975 TABLET ORAL ONCE
Status: COMPLETED | OUTPATIENT
Start: 2021-06-28 | End: 2021-06-28

## 2021-06-28 RX ADMIN — ACETAMINOPHEN 975 MG: 325 TABLET, FILM COATED ORAL at 22:44

## 2021-06-28 RX ADMIN — SODIUM CHLORIDE 1000 ML: 0.9 INJECTION, SOLUTION INTRAVENOUS at 22:29

## 2021-06-28 NOTE — TELEPHONE ENCOUNTER
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

## 2021-06-29 NOTE — ED PROVIDER NOTES
History  Chief Complaint   Patient presents with    Abdominal Pain     hx of chrones, reports low grade fever at home, abdominal cramping since saturday, and blood stools today     45-year-old female with history of Crohn's disease on Humira presents to the emergency department for evaluation of suspected Crohn's flare  The patient reports symptoms started 2 days ago with multiple bowel movements and abdominal pain  The patient reports that her bowel movements are loose at baseline  Reports that her bowel movements have continued to be loose but have become more frequent  States that today she noticed blood in her stool and had a fever at home so came to the emergency department for further evaluation  She did not take anything to treat her symptoms prior to coming to the emergency department  The patient reports that she was hospitalized once before for a Crohn's flare  Prior to Admission Medications   Prescriptions Last Dose Informant Patient Reported? Taking? adalimumab (HUMIRA) 40 mg/0 8 mL PSKT   No No   Sig: Inject 1 6 mL (80 mg total) under the skin once a week   drospirenone-ethinyl estradiol (OTTONIEL) 3-0 02 MG per tablet   No No   Sig: TAKE 1 TABLET DAILY   escitalopram (LEXAPRO) 20 mg tablet   No No   Sig: Take 1 tablet (20 mg total) by mouth daily   ferrous sulfate 324 (65 Fe) mg   No No   Sig: Take 1 tablet (324 mg total) by mouth 2 (two) times a day before meals   mupirocin (BACTROBAN) 2 % ointment   No No   Sig: Apply topically 3 (three) times a day   ondansetron (ZOFRAN-ODT) 4 mg disintegrating tablet   No No   Sig: Take 1 tablet (4 mg total) by mouth every 6 (six) hours as needed for nausea or vomiting   scopolamine (TRANSDERM-SCOP) 1 5 mg/3 days TD 72 hr patch   No No   Sig: Place 1 patch on the skin every third day      Facility-Administered Medications: None       Past Medical History:   Diagnosis Date    Crohn's colitis ([facility] )     Crohn's disease ([facility] )        History reviewed   No pertinent surgical history  History reviewed  No pertinent family history  I have reviewed and agree with the history as documented  E-Cigarette/Vaping    E-Cigarette Use Never User      E-Cigarette/Vaping Substances     Social History     Tobacco Use    Smoking status: Never Smoker    Smokeless tobacco: Never Used   Vaping Use    Vaping Use: Never used   Substance Use Topics    Alcohol use: Yes     Comment: social    Drug use: Never        Review of Systems   Constitutional: Negative for chills and fever  HENT: Negative for ear pain and sore throat  Eyes: Negative for pain and visual disturbance  Respiratory: Negative for cough and shortness of breath  Cardiovascular: Negative for chest pain and palpitations  Gastrointestinal: Positive for abdominal pain, blood in stool and diarrhea  Negative for vomiting  Genitourinary: Negative for dysuria and hematuria  Musculoskeletal: Negative for arthralgias and back pain  Skin: Negative for color change and rash  Neurological: Negative for seizures and syncope  All other systems reviewed and are negative  Physical Exam  ED Triage Vitals   Temperature Pulse Respirations Blood Pressure SpO2   06/28/21 2048 06/28/21 2048 06/28/21 2048 06/28/21 2048 06/28/21 2048   100 1 °F (37 8 °C) 105 20 (!) 139/101 96 %      Temp Source Heart Rate Source Patient Position - Orthostatic VS BP Location FiO2 (%)   06/28/21 2048 -- -- -- --   Oral          Pain Score       06/28/21 2049       7             Orthostatic Vital Signs  Vitals:    06/28/21 2048   BP: (!) 139/101   Pulse: 105       Physical Exam  Vitals and nursing note reviewed  Constitutional:       General: She is not in acute distress  Appearance: She is well-developed  HENT:      Head: Normocephalic and atraumatic  Eyes:      Conjunctiva/sclera: Conjunctivae normal    Cardiovascular:      Rate and Rhythm: Normal rate and regular rhythm  Heart sounds: No murmur heard  Pulmonary:      Effort: Pulmonary effort is normal  No respiratory distress  Breath sounds: Normal breath sounds  Abdominal:      Palpations: Abdomen is soft  Tenderness: There is no abdominal tenderness  Musculoskeletal:      Cervical back: Neck supple  Skin:     General: Skin is warm and dry  Neurological:      Mental Status: She is alert           ED Medications  Medications   sodium chloride 0 9 % bolus 1,000 mL (0 mL Intravenous Stopped 6/28/21 2329)   acetaminophen (TYLENOL) tablet 975 mg (975 mg Oral Given 6/28/21 2244)       Diagnostic Studies  Results Reviewed     Procedure Component Value Units Date/Time    Sedimentation rate, automated [[id number]]  (Abnormal) Collected: 06/28/21 2229    Lab Status: Final result Specimen: Blood from Arm, Left Updated: 06/28/21 2301     Sed Rate 24 mm/hour     CBC and differential [[id number]]  (Abnormal) Collected: 06/28/21 2229    Lab Status: Final result Specimen: Blood from Arm, Left Updated: 06/28/21 2243     WBC 7 08 Thousand/uL      RBC 3 92 Million/uL      Hemoglobin 11 0 g/dL      Hematocrit 32 6 %      MCV 83 fL      MCH 28 1 pg      MCHC 33 7 g/dL      RDW 12 5 %      MPV 8 9 fL      Platelets 836 Thousands/uL      nRBC 0 /100 WBCs      Neutrophils Relative 42 %      Immat GRANS % 0 %      Lymphocytes Relative 41 %      Monocytes Relative 10 %      Eosinophils Relative 6 %      Basophils Relative 1 %      Neutrophils Absolute 2 97 Thousands/µL      Immature Grans Absolute 0 02 Thousand/uL      Lymphocytes Absolute 2 90 Thousands/µL      Monocytes Absolute 0 70 Thousand/µL      Eosinophils Absolute 0 45 Thousand/µL      Basophils Absolute 0 04 Thousands/µL     Lipase [[id number]]  (Abnormal) Collected: 06/28/21 2153    Lab Status: Final result Specimen: Blood from Arm, Right Updated: 06/28/21 2230     Lipase 52 u/L     Comprehensive metabolic panel [[id number]]  (Abnormal) Collected: 06/28/21 2153    Lab Status: Final result Specimen: Blood from Arm, Right Updated: 06/28/21 2230     Sodium 136 mmol/L      Potassium 3 7 mmol/L      Chloride 105 mmol/L      CO2 23 mmol/L      ANION GAP 8 mmol/L      BUN 9 mg/dL      Creatinine 0 79 mg/dL      Glucose 82 mg/dL      Calcium 8 3 mg/dL      Corrected Calcium 9 3 mg/dL      AST 34 U/L      ALT 18 U/L      Alkaline Phosphatase 142 U/L      Total Protein 7 9 g/dL      Albumin 2 8 g/dL      Total Bilirubin 0 44 mg/dL      eGFR 104 ml/min/1 73sq m     Narrative:      [place] guidelines for Chronic Kidney Disease (CKD):     Stage 1 with normal or high GFR (GFR > 90 mL/min/1 73 square meters)    Stage 2 Mild CKD (GFR = 60-89 mL/min/1 73 square meters)    Stage 3A Moderate CKD (GFR = 45-59 mL/min/1 73 square meters)    Stage 3B Moderate CKD (GFR = 30-44 mL/min/1 73 square meters)    Stage 4 Severe CKD (GFR = 15-29 mL/min/1 73 square meters)    Stage 5 End Stage CKD (GFR <15 mL/min/1 73 square meters)  Note: GFR calculation is accurate only with a steady state creatinine    C-reactive protein [[id number]]  (Abnormal) Collected: 06/28/21 2153    Lab Status: Final result Specimen: Blood from Arm, Right Updated: 06/28/21 2230     CRP 82 4 mg/L                  No orders to display         Procedures  Procedures      ED Course                                       MDM  Number of Diagnoses or Management Options  Crohn's disease [facility])  Diagnosis management comments: 23-year-old female presented to the emergency department for evaluation of suspected Crohn's flare  On arrival the patient was awake, alert, oriented and in no acute distress  Physical exam was unremarkable including a benign abdominal examination  Workup done in the emergency department showed the patient had a normal white blood cell count but elevated ESR and CRP  Hemoglobin was stable  The case was discussed with the GI fellow with recommendation to start the patient on an 8 week steroid taper    She is appropriate for discharge at this time with recommendation to follow up with her gastroenterologist tomorrow  Return precautions were discussed  Patient agrees with the plan for discharge and feels comfortable to go home with proper f/u  Advised to return for worsening or additional problems  Diagnostic tests were reviewed and questions answered  Diagnosis, care plan and treatment options were discussed  The patient understands instructions and will follow up as directed  Disposition  Final diagnoses:   Crohn's disease ([facility] )     Time reflects when diagnosis was documented in both MDM as applicable and the Disposition within this note     Time User Action Codes Description Comment    6/28/2021 11:46 PM Stephy Angle Add [K50 10] Crohn's colitis, without complications ([facility] )     1/93/1764 11:46 PM Stephy Angle Remove [K50 10] Crohn's colitis, without complications ([facility] )     8/35/4953 11:46 PM Stephy Angle Add [K50 90] Crohn's disease [facility])       ED Disposition     ED Disposition Condition Date/Time Comment    Discharge Stable Mon Jun 28, 2021 11:48 PM Jonathan Berry discharge to home/self care              Follow-up Information     Follow up With Specialties Details Why Contact Info Additional Information    Cielo Shane MD Gastroenterology Schedule an appointment as soon as possible for a visit   [address] [address] [phone]       [address] [place] [address] Emergency Department Emergency Medicine Go to  If symptoms worsen [address] [place] [address] 64 [facility] Emergency Department, [address]          Discharge Medication List as of 6/28/2021 11:48 PM      START taking these medications    Details   predniSONE 10 mg tablet Multiple Dosages:Starting Mon 6/28/2021, Until Sun 7/4/2021 at 2359, THEN Starting Mon 7/5/2021, Until Sun 7/11/2021 at 2359, THEN Starting Mon 7/12/2021, Until Sun 7/18/2021 at 2359, THEN Starting Mon 7/19/2021, Until Sun 7/25/2021 at 2359, [address] [place] arting Mon 7/26/2021, Until Sun 8/1/2021 at 2359, THEN Starting Mon 8/2/2021, Until Sun 8/8/2021 at 2359, THEN Starting Mon 8/9/2021, Until Sun 8/15/2021 at 2359, THEN Starting Mon 8/16/2021, Until Sun 8/22/2021 at 2359Take 4 tablets (40 mg total) by  mouth daily for 7 days, THEN 3 5 tablets (35 mg total) daily for 7 days, THEN 3 tablets (30 mg total) daily for 7 days, THEN 2 5 tablets (25 mg total) daily for 7 days, THEN 2 tablets (20 mg total) daily for 7 days, THEN 1 5 tablets (15 mg total) daily  for 7 days, THEN 1 tablet (10 mg total) daily for 7 days, THEN 0 5 tablets (5 mg total) daily for 7 days  , Normal         CONTINUE these medications which have NOT CHANGED    Details   adalimumab (HUMIRA) 40 mg/0 8 mL PSKT Inject 1 6 mL (80 mg total) under the skin once a week, Starting Tue 9/1/2020, Normal      drospirenone-ethinyl estradiol (OTTONIEL) 3-0 02 MG per tablet TAKE 1 TABLET DAILY, Normal      escitalopram (LEXAPRO) 20 mg tablet Take 1 tablet (20 mg total) by mouth daily, Starting Wed 4/14/2021, Normal      ferrous sulfate 324 (65 Fe) mg Take 1 tablet (324 mg total) by mouth 2 (two) times a day before meals, Starting Tue 9/15/2020, Normal      mupirocin (BACTROBAN) 2 % ointment Apply topically 3 (three) times a day, Starting Tue 8/25/2020, Normal      ondansetron (ZOFRAN-ODT) 4 mg disintegrating tablet Take 1 tablet (4 mg total) by mouth every 6 (six) hours as needed for nausea or vomiting, Starting Thu 3/11/2021, Normal      scopolamine (TRANSDERM-SCOP) 1 5 mg/3 days TD 72 hr patch Place 1 patch on the skin every third day, Starting Thu 3/11/2021, Normal           No discharge procedures on file  PDMP Review     None           ED Provider  Attending physically available and evaluated Bishop Li HIDALGO managed the patient along with the ED Attending      Electronically Signed by         Camila Abdul MD  06/29/21 6847

## 2021-06-29 NOTE — TELEPHONE ENCOUNTER
Hi,    Agree with recs  Can she complete the stool tests ordered in May? I also want her to get a Humira level prior to her next Humira injection    Can we add her to my schedule this week or next week (perhaps next week Wednesday)? I prefer an in office visit and not a virtual visit if possible

## 2021-06-29 NOTE — TELEPHONE ENCOUNTER
Called pt and reviewed provider recommendations  Pt stated she will get all ordered blood work and stool studies completed tomorrow or Thursday morning before humira injection  Also explained to pt Dr Varun Serna would like to see her in office this week or next week  Pt prefers [place] [facility] [place] [facility]  PT agreeable to plan and verbalized understanding

## 2021-06-29 NOTE — TELEPHONE ENCOUNTER
Office visit 7/2/20 Dr Rivera Duty   Hx: Ileocolonic Crohn's Dx  Colonoscopy: 7/13/20  ER visit: 6/28/21 (Please see blood work)    Dunia Morrell pt post ER visit yesterday and reports ongoing flare symptoms  Stated her BMs have been irregular for months and got worse this past satruday with stool frequency, fevers, bloody stool  Currently still experiencing increased fatigue/lethargy, 8 episodes of diarrhea between midnight to now, some blood in BMs, back pain, nausea, and lightheadedness  Currently abdominal pain has subsided, temp this AM 98 5  Pt will start prednisone taper prescribed by ER this afternoon  While on phone pt stated she has been having "itchy baseball size" welts post weekly humira injection which she has been taking benadryl for with no relief  Also wants to know how to treat "large amount" of external hemorrhoids  Advised pt to start bland diet, start prednisone taper, and can use OTC prep H and see if this helps hemorrhoids as well as to avoid straining with BMs if possible  Please advise

## 2021-06-30 ENCOUNTER — APPOINTMENT (OUTPATIENT)
Dept: LAB | Facility: MEDICAL CENTER | Age: 26
End: 2021-06-30
Payer: COMMERCIAL

## 2021-06-30 DIAGNOSIS — K50.119 CROHN'S DISEASE OF COLON WITH COMPLICATION (HCC): ICD-10-CM

## 2021-06-30 DIAGNOSIS — R53.83 OTHER FATIGUE: ICD-10-CM

## 2021-06-30 LAB
ALBUMIN SERPL BCP-MCNC: 2.8 G/DL (ref 3.5–5)
ALP SERPL-CCNC: 135 U/L (ref 46–116)
ALT SERPL W P-5'-P-CCNC: 18 U/L (ref 12–78)
ANION GAP SERPL CALCULATED.3IONS-SCNC: 6 MMOL/L (ref 4–13)
AST SERPL W P-5'-P-CCNC: 24 U/L (ref 5–45)
BASOPHILS # BLD AUTO: 0.04 THOUSANDS/ΜL (ref 0–0.1)
BASOPHILS NFR BLD AUTO: 1 % (ref 0–1)
BILIRUB SERPL-MCNC: 0.32 MG/DL (ref 0.2–1)
BUN SERPL-MCNC: 7 MG/DL (ref 5–25)
CALCIUM ALBUM COR SERPL-MCNC: 9.6 MG/DL (ref 8.3–10.1)
CALCIUM SERPL-MCNC: 8.6 MG/DL (ref 8.3–10.1)
CHLORIDE SERPL-SCNC: 108 MMOL/L (ref 100–108)
CO2 SERPL-SCNC: 24 MMOL/L (ref 21–32)
CREAT SERPL-MCNC: 0.81 MG/DL (ref 0.6–1.3)
CRP SERPL QL: 70.4 MG/L
EOSINOPHIL # BLD AUTO: 0.27 THOUSAND/ΜL (ref 0–0.61)
EOSINOPHIL NFR BLD AUTO: 3 % (ref 0–6)
ERYTHROCYTE [DISTWIDTH] IN BLOOD BY AUTOMATED COUNT: 12.4 % (ref 11.6–15.1)
GFR SERPL CREATININE-BSD FRML MDRD: 101 ML/MIN/1.73SQ M
GLUCOSE P FAST SERPL-MCNC: 79 MG/DL (ref 65–99)
HCT VFR BLD AUTO: 33.7 % (ref 34.8–46.1)
HGB BLD-MCNC: 11.2 G/DL (ref 11.5–15.4)
IMM GRANULOCYTES # BLD AUTO: 0.04 THOUSAND/UL (ref 0–0.2)
IMM GRANULOCYTES NFR BLD AUTO: 1 % (ref 0–2)
LYMPHOCYTES # BLD AUTO: 3.27 THOUSANDS/ΜL (ref 0.6–4.47)
LYMPHOCYTES NFR BLD AUTO: 40 % (ref 14–44)
MCH RBC QN AUTO: 28.1 PG (ref 26.8–34.3)
MCHC RBC AUTO-ENTMCNC: 33.2 G/DL (ref 31.4–37.4)
MCV RBC AUTO: 85 FL (ref 82–98)
MONOCYTES # BLD AUTO: 0.66 THOUSAND/ΜL (ref 0.17–1.22)
MONOCYTES NFR BLD AUTO: 8 % (ref 4–12)
NEUTROPHILS # BLD AUTO: 3.97 THOUSANDS/ΜL (ref 1.85–7.62)
NEUTS SEG NFR BLD AUTO: 47 % (ref 43–75)
NRBC BLD AUTO-RTO: 0 /100 WBCS
PLATELET # BLD AUTO: 354 THOUSANDS/UL (ref 149–390)
PMV BLD AUTO: 9.4 FL (ref 8.9–12.7)
POTASSIUM SERPL-SCNC: 3.9 MMOL/L (ref 3.5–5.3)
PROT SERPL-MCNC: 7.7 G/DL (ref 6.4–8.2)
RBC # BLD AUTO: 3.99 MILLION/UL (ref 3.81–5.12)
SODIUM SERPL-SCNC: 138 MMOL/L (ref 136–145)
TSH SERPL DL<=0.05 MIU/L-ACNC: 2.83 UIU/ML (ref 0.36–3.74)
WBC # BLD AUTO: 8.25 THOUSAND/UL (ref 4.31–10.16)

## 2021-06-30 PROCEDURE — 82397 CHEMILUMINESCENT ASSAY: CPT

## 2021-06-30 PROCEDURE — 84443 ASSAY THYROID STIM HORMONE: CPT

## 2021-06-30 PROCEDURE — 36415 COLL VENOUS BLD VENIPUNCTURE: CPT

## 2021-06-30 PROCEDURE — 80053 COMPREHEN METABOLIC PANEL: CPT

## 2021-06-30 PROCEDURE — 86140 C-REACTIVE PROTEIN: CPT

## 2021-06-30 PROCEDURE — 86480 TB TEST CELL IMMUN MEASURE: CPT

## 2021-06-30 PROCEDURE — 85025 COMPLETE CBC W/AUTO DIFF WBC: CPT

## 2021-06-30 PROCEDURE — 80145 DRUG ASSAY ADALIMUMAB: CPT

## 2021-07-01 ENCOUNTER — ANNUAL EXAM (OUTPATIENT)
Dept: OBGYN CLINIC | Facility: CLINIC | Age: 26
End: 2021-07-01
Payer: COMMERCIAL

## 2021-07-01 ENCOUNTER — APPOINTMENT (OUTPATIENT)
Dept: LAB | Facility: MEDICAL CENTER | Age: 26
End: 2021-07-01
Payer: COMMERCIAL

## 2021-07-01 VITALS
DIASTOLIC BLOOD PRESSURE: 62 MMHG | BODY MASS INDEX: 29.89 KG/M2 | HEIGHT: 66 IN | SYSTOLIC BLOOD PRESSURE: 100 MMHG | WEIGHT: 186 LBS

## 2021-07-01 DIAGNOSIS — Z01.419 ENCOUNTER FOR WELL WOMAN EXAM: Primary | ICD-10-CM

## 2021-07-01 DIAGNOSIS — K50.119 CROHN'S DISEASE OF COLON WITH COMPLICATION (HCC): ICD-10-CM

## 2021-07-01 DIAGNOSIS — R87.811 ATYPICAL SQUAMOUS CELL CHANGES OF UNDETERMINED SIGNIFICANCE (ASCUS) ON VAGINAL CYTOLOGY WITH POSITIVE HIGH RISK HUMAN PAPILLOMA VIRUS (HPV): ICD-10-CM

## 2021-07-01 DIAGNOSIS — Z12.39 ENCOUNTER FOR SCREENING BREAST EXAMINATION: ICD-10-CM

## 2021-07-01 DIAGNOSIS — Z30.41 SURVEILLANCE FOR BIRTH CONTROL, ORAL CONTRACEPTIVES: ICD-10-CM

## 2021-07-01 DIAGNOSIS — T75.3XXA SEVERE MOTION SICKNESS, INITIAL ENCOUNTER: ICD-10-CM

## 2021-07-01 DIAGNOSIS — R87.620 ATYPICAL SQUAMOUS CELL CHANGES OF UNDETERMINED SIGNIFICANCE (ASCUS) ON VAGINAL CYTOLOGY WITH POSITIVE HIGH RISK HUMAN PAPILLOMA VIRUS (HPV): ICD-10-CM

## 2021-07-01 LAB
GAMMA INTERFERON BACKGROUND BLD IA-ACNC: 0.04 IU/ML
M TB IFN-G BLD-IMP: NEGATIVE
M TB IFN-G CD4+ BCKGRND COR BLD-ACNC: 0.01 IU/ML
M TB IFN-G CD4+ BCKGRND COR BLD-ACNC: 0.01 IU/ML
MITOGEN IGNF BCKGRD COR BLD-ACNC: >10 IU/ML

## 2021-07-01 PROCEDURE — 3008F BODY MASS INDEX DOCD: CPT | Performed by: INTERNAL MEDICINE

## 2021-07-01 PROCEDURE — S0612 ANNUAL GYNECOLOGICAL EXAMINA: HCPCS | Performed by: PHYSICIAN ASSISTANT

## 2021-07-01 PROCEDURE — 87505 NFCT AGENT DETECTION GI: CPT

## 2021-07-01 PROCEDURE — 83993 ASSAY FOR CALPROTECTIN FECAL: CPT

## 2021-07-01 PROCEDURE — 88175 CYTOPATH C/V AUTO FLUID REDO: CPT | Performed by: PHYSICIAN ASSISTANT

## 2021-07-01 PROCEDURE — 87209 SMEAR COMPLEX STAIN: CPT

## 2021-07-01 PROCEDURE — 87177 OVA AND PARASITES SMEARS: CPT

## 2021-07-01 RX ORDER — SCOLOPAMINE TRANSDERMAL SYSTEM 1 MG/1
1 PATCH, EXTENDED RELEASE TRANSDERMAL
Qty: 10 PATCH | Refills: 0 | Status: SHIPPED | OUTPATIENT
Start: 2021-07-01 | End: 2021-09-28 | Stop reason: SDUPTHER

## 2021-07-01 RX ORDER — DROSPIRENONE AND ETHINYL ESTRADIOL 0.02-3(28)
1 KIT ORAL DAILY
Qty: 90 TABLET | Refills: 4 | Status: SHIPPED | OUTPATIENT
Start: 2021-07-01 | End: 2022-08-08

## 2021-07-01 RX ORDER — ONDANSETRON 4 MG/1
4 TABLET, ORALLY DISINTEGRATING ORAL EVERY 6 HOURS PRN
Qty: 20 TABLET | Refills: 0 | Status: SHIPPED | OUTPATIENT
Start: 2021-07-01 | End: 2021-09-28 | Stop reason: SDUPTHER

## 2021-07-01 NOTE — PROGRESS NOTES
Assessment/Plan:    No problem-specific Assessment & Plan notes found for this encounter  Diagnoses and all orders for this visit:    Encounter for well woman exam    Encounter for screening breast examination    Atypical squamous cell changes of undetermined significance (ASCUS) on vaginal cytology with positive high risk human papilloma virus (HPV)  -     Liquid-based pap, diagnostic    Surveillance for birth control, oral contraceptives  -     drospirenone-ethinyl estradiol (OTTONIEL) 3-0 02 MG per tablet; Take 1 tablet by mouth daily          Subjective:      Patient ID: Casey Uribe is a 23 y o  female  Pt presents for her annual exam today--  She has no complaints  She has light bleeding, no pelvic pain--on ocp  Bowel and bladder are regular   Having a crohn's flare this week--was in ER for IVF and feeling better  No breast concerns today      pap today  rx ottoniel  Daily mvi      The following portions of the patient's history were reviewed and updated as appropriate: allergies, current medications, past family history, past medical history, past social history, past surgical history and problem list     Review of Systems   Constitutional: Negative for chills, fever and unexpected weight change  Gastrointestinal: Negative for abdominal pain, blood in stool, constipation and diarrhea  Genitourinary: Negative  Objective: Wt 84 4 kg (186 lb)   BMI 30 02 kg/m²          Physical Exam  Vitals and nursing note reviewed  Constitutional:       Appearance: She is well-developed  HENT:      Head: Normocephalic and atraumatic  Chest:      Breasts:         Right: No inverted nipple, mass, nipple discharge or skin change  Left: No inverted nipple, mass, nipple discharge or skin change  Abdominal:      Palpations: Abdomen is soft  Genitourinary:     Exam position: Supine  Labia:         Right: No rash, tenderness or lesion  Left: No rash, tenderness or lesion  Vagina: Normal       Cervix: No cervical motion tenderness, discharge or friability  Adnexa:         Right: No mass, tenderness or fullness  Left: No mass, tenderness or fullness  Musculoskeletal:      Cervical back: Normal range of motion  Lymphadenopathy:      Lower Body: No right inguinal adenopathy  No left inguinal adenopathy

## 2021-07-01 NOTE — PROGRESS NOTES
The patient is here for a yearly  The patient is due for a pap smear  7/13/20 Negaitve Colposcopy  6/22/20 ASC-H  3/26/19 Normal Pap, Negative Gc/Chlmaydia  2/23/18 Normal Pap  The patient has regular periods  No vaginal, breast or bladder issues  The patient has chron's disease so she has some bowel issues

## 2021-07-02 LAB
C DIFF TOX B TCDB STL QL NAA+PROBE: NEGATIVE
CAMPYLOBACTER DNA SPEC NAA+PROBE: NORMAL
SALMONELLA DNA SPEC QL NAA+PROBE: NORMAL
SHIGA TOXIN STX GENE SPEC NAA+PROBE: NORMAL
SHIGELLA DNA SPEC QL NAA+PROBE: NORMAL

## 2021-07-05 NOTE — ED ATTENDING ATTESTATION
6/28/2021  IDamaris DO, saw and evaluated the patient  I have discussed the patient with the resident/non-physician practitioner and agree with the resident's/non-physician practitioner's findings, Plan of Care, and MDM as documented in the resident's/non-physician practitioner's note, except where noted  All available labs and Radiology studies were reviewed  I was present for key portions of any procedure(s) performed by the resident/non-physician practitioner and I was immediately available to provide assistance  At this point I agree with the current assessment done in the Emergency Department  I have conducted an independent evaluation of this patient a history and physical is as follows:        64-year-old female with history of Crohn's disease presents for low-grade fever at home abdominal cramping some blood in her stools  Typical Crohn flare  Is on Humira  Otherwise looks well as diffuse abdominal pain without rebound    Plan to check labs will start steroids likely, pain control IV fluid    ED Course         Critical Care Time  Procedures

## 2021-07-06 LAB — CALPROTECTIN STL-MCNT: 678 UG/G (ref 0–120)

## 2021-07-07 ENCOUNTER — OFFICE VISIT (OUTPATIENT)
Dept: GASTROENTEROLOGY | Facility: MEDICAL CENTER | Age: 26
End: 2021-07-07
Payer: COMMERCIAL

## 2021-07-07 ENCOUNTER — TELEPHONE (OUTPATIENT)
Dept: GASTROENTEROLOGY | Facility: MEDICAL CENTER | Age: 26
End: 2021-07-07

## 2021-07-07 VITALS
BODY MASS INDEX: 29.89 KG/M2 | HEART RATE: 92 BPM | TEMPERATURE: 98.7 F | DIASTOLIC BLOOD PRESSURE: 66 MMHG | SYSTOLIC BLOOD PRESSURE: 112 MMHG | WEIGHT: 185.2 LBS

## 2021-07-07 DIAGNOSIS — R15.2 FECAL URGENCY: ICD-10-CM

## 2021-07-07 DIAGNOSIS — K50.119 CROHN'S DISEASE OF COLON WITH COMPLICATION (HCC): Primary | ICD-10-CM

## 2021-07-07 DIAGNOSIS — D84.9 IMMUNOCOMPROMISED STATE (HCC): ICD-10-CM

## 2021-07-07 DIAGNOSIS — R19.7 DIARRHEA, UNSPECIFIED TYPE: ICD-10-CM

## 2021-07-07 LAB
ADALIMUMAB AB SERPL-MCNC: 47 NG/ML
ADALIMUMAB SERPL-MCNC: 2.7 UG/ML
O+P STL CONC: NORMAL

## 2021-07-07 PROCEDURE — 99214 OFFICE O/P EST MOD 30 MIN: CPT | Performed by: INTERNAL MEDICINE

## 2021-07-07 PROCEDURE — 1036F TOBACCO NON-USER: CPT | Performed by: INTERNAL MEDICINE

## 2021-07-07 RX ORDER — METHYLPREDNISOLONE SODIUM SUCCINATE 40 MG/ML
40 INJECTION, POWDER, LYOPHILIZED, FOR SOLUTION INTRAMUSCULAR; INTRAVENOUS ONCE
Status: CANCELLED | OUTPATIENT
Start: 2021-08-23

## 2021-07-07 RX ORDER — SODIUM CHLORIDE 9 MG/ML
20 INJECTION, SOLUTION INTRAVENOUS ONCE
Status: CANCELLED | OUTPATIENT
Start: 2021-08-23

## 2021-07-07 RX ORDER — DIPHENHYDRAMINE HCL 25 MG
25 TABLET ORAL ONCE
Status: CANCELLED | OUTPATIENT
Start: 2021-08-23

## 2021-07-07 RX ORDER — ACETAMINOPHEN 325 MG/1
650 TABLET ORAL ONCE
Status: CANCELLED | OUTPATIENT
Start: 2021-08-23

## 2021-07-07 NOTE — PROGRESS NOTES
[facility] - Outpatient Follow-up Note  Oral Raymundo 23 y o  female MRN: [id number]  Encounter: [id number]          ASSESSMENT AND PLAN:    Oral Raymundo is a 23 y o  female who presents with complaint of ileocolonic Crohn's disease diagnosed approximately 2019 who presents for follow-up  She has been on Humira soon after diagnosis but she was not able to achieve adequate levels  Her dose was increased to weekly and then twice weekly  Her recent Humira level was low with low level of antibodies  She was doing well until recently when she developed a flare  Stool tests were negative for C diff and fecal calprotectin was elevated  She was started on a prednisone taper  She now presents for follow-up with disease that is moderately active  Humira level low at 2 7 with ADA at 47  Recent BMP normal aside from low albumin of 2 8 and elevated alk phos  CBC with mild anemia and Hgb 11 2  C diff negative  Calprotectin 678  CRP 70 4  Quant gold negative  Last colonoscopy showed significant inflammation 1 year ago    1  [facility]'s disease of colon with complication ([facility] )    2  Immunocompromised state ([facility] )    3  Diarrhea, unspecified type    4   Fecal urgency        Orders Placed This Encounter   Procedures    Ambulatory referral to Dermatology     Continue current prednisone taper with plan to decrease daily dose by 5 mg every 7 days and ultimately to complete taper in mid to end August  Will plan to stop Humira and switch her to Remicade as Humira dose was not adequate despite 2 injections per week  She does not want to start Imuran and is not interested in methotrexate  Plan to repeat blood work and stool tests at next visit  Ongoing IBD Healthcare maintenance discussed  Will give referral for dermatologist  Routine dermatologic, ophthalmologic, Pap smears  DEXA scan in the future once off prednisone for at least 3 months  COVID vaccine up-to-date  I recommended yearly flu shot with non live virus as well as pneumonia vaccines and Shingrix  Consider repeat colonoscopy in approximately 6-12 months  Consider repeat MR enterography  ______________________________________________________________________    SUBJECTIVE:    Waldemar Seip is a 23 y o  female who presents with complaint of IBD  She went to the ED last week for not feeling well and low grade fevers  She was not feeling great  + exhaustion, fatigue  On average she is having 4-5 BMs per day, loose and watery  + urgency (can hold it in for 2 minutes)  + nocturnal BMs a few times a week  2 incontinence episodes in the past year  Little blood just once but nothing recently  No black stools  Now with prednisone she is feeling better  No abdominal pain, heartburn, dysphagia, odynophagia, nausea, vomiting  Weight has been stable  No weight loss in the past year  No rashes, joint pains, mouth sores  No fevers since last week  She is on Humira 2 injections per week  She was on imuran but would prefer not to be on it         Answers for HPI/ROS submitted by the patient on 7/6/2021  When you are not experiencing symptoms of your inflammatory bowel disease, how many bowel movements do you typically have each day?: 2  What is the average (typical) number of bowel movements that you had in a single day during the last week?: 5  Over the last 3 days, have you had any bowel movements where you passed blood without stool?: No  Since your last visit, have you received any vaccinations?: Yes  Since the last visit, have you had an infection?: No  Is there any possibility that you may be pregnant?: No  In the past three months, have you used tobacco in any form?: No  During the last year, how many days have you missed work or school because of your inflammatory bowel disease?: 1  During the last year, how many days have you been hospitalized because of your inflammatory bowel disease?: 0  During the last year, how many days have you visited a hospital emergency department because of your inflammatory bowel disease?: 1  During the last month, have you taken narcotic pain medications (such as Percocet, oxycodone, Oxycontin, morphine, Vicodin, Dilaudid, MS Contin) for your inflammatory bowel disease?: No  Have you awoken at night because you needed to move your bowels during the last month? : Yes  Have you had leakage of stool while sleeping during the last month?: No  Have you had leakage of stool while you were awake during the last month?: No  In the last 6 months, have you unintentionally lost weight?: No  Fever: No  Eye irritation: No  Mouth sores: No  Sore throat: No  Chest pain: No  Shortness of breath: No  Numbness or tingling in your hands or feet: No  Skin rash: No  Pain or swelling in your joints: No  Bruising or bleeding: No  Felt depressed or blue: Yes        REVIEW OF SYSTEMS IS OTHERWISE NEGATIVE  10 point ROS reviewed and negative, except as above      Historical Information   Past Medical History:   Diagnosis Date    Crohn's colitis ([place] [address] )     Crohn's disease ([place] [address] )      History reviewed  No pertinent surgical history    Social History   Social History     Substance and Sexual Activity   Alcohol Use Yes    Comment: social     Social History     Substance and Sexual Activity   Drug Use Never     Social History     Tobacco Use   Smoking Status Never Smoker   Smokeless Tobacco Never Used     Family History   Problem Relation Age of Onset    Breast cancer Maternal Grandmother        Meds/Allergies       Current Outpatient Medications:     adalimumab (HUMIRA) 40 mg/0 8 mL PSKT    drospirenone-ethinyl estradiol (OTTONIEL) 3-0 02 MG per tablet    escitalopram (LEXAPRO) 20 mg tablet    ferrous sulfate 324 (65 Fe) mg    mupirocin (BACTROBAN) 2 % ointment    ondansetron (ZOFRAN-ODT) 4 mg disintegrating tablet    predniSONE 10 mg tablet    scopolamine (TRANSDERM-SCOP) 1 5 mg/3 days TD 72 hr patch    Allergies   Allergen Reactions    Penicillins Rash           Objective     Blood pressure 112/66, pulse 92, temperature 98 7 °F (37 1 °C), weight 84 kg (185 lb 3 2 oz), last menstrual period 06/20/2021, not currently breastfeeding  Body mass index is 29 89 kg/m²  PHYSICAL EXAMINATION:    General Appearance:   Alert, cooperative, no distress   HEENT:  Normocephalic, atraumatic, anicteric  Neck supple, symmetrical, trachea midline  Lungs:   Equal chest rise and unlabored breathing, normal effort, no coughing  Cardiovascular:   No visualized JVD  Abdomen:   No abdominal distension  Skin:   No jaundice, rashes, or lesions  Musculoskeletal:   Normal range of motion visualized  Psych:  Normal affect and normal insight  Neuro:  Alert and appropriate  Lab Results:   No visits with results within 1 Day(s) from this visit  Latest known visit with results is:   Appointment on 07/01/2021   Component Date Value     C difficile toxin by PC* 07/01/2021 Negative     Salmonella sp PCR 07/01/2021 None Detected     Shigella sp/Enteroinvasi* 07/01/2021 None Detected     Campylobacter sp (jejuni* 07/01/2021 None Detected     Shiga toxin 1/Shiga toxi* 07/01/2021 None Detected     Ova + Parasite Exam 07/01/2021 No ova, cysts, or parasites seen     One negative specimen does not rule out the possibility of a  parasitic infection       Calprotectin 07/01/2021 678*       Lab Results   Component Value Date    WBC 8 25 06/30/2021    HGB 11 2 (L) 06/30/2021    HCT 33 7 (L) 06/30/2021    MCV 85 06/30/2021     06/30/2021       Lab Results   Component Value Date    SODIUM 138 06/30/2021    K 3 9 06/30/2021     06/30/2021    CO2 24 06/30/2021    AGAP 6 06/30/2021    BUN 7 06/30/2021    CREATININE 0 81 06/30/2021    GLUC 82 06/28/2021    GLUF 79 06/30/2021    CALCIUM 8 6 06/30/2021    AST 24 06/30/2021    ALT 18 06/30/2021    ALKPHOS 135 (H) 06/30/2021    TP 7 7 06/30/2021 TBILI 0 32 06/30/2021    EGFR 101 06/30/2021       Lab Results   Component Value Date    CRP 70 4 (H) 06/30/2021       Lab Results   Component Value Date    QBN1UAVGJWRU 2 830 06/30/2021       Lab Results   Component Value Date    IRON 18 (L) 06/12/2020    TIBC 241 (L) 06/12/2020    FERRITIN 111 06/12/2020       Radiology Results:   No results found

## 2021-07-07 NOTE — PATIENT INSTRUCTIONS
Remicade (infliximab) Drug Information:      Your doctor has discussed the following subject with you: Remicade  Here is some additional information  Let us know if you have any questions regarding this information  · This medication belongs to a class of drugs called biologics  It helps to reduce irritation and swelling (inflammation) in the intestines  In some cases, this medication is used by itself  In other cases, this medication is used together with another medication to achieve better results  · This medication is also considered an anti-TNF drug, which means that it targets a specific protein in your body called tumor necrosis factor (TNF) that causes inflammation in your intestines  · It is injected into a vein in a procedure called an infusion  The infusion is given at a certified infusion center and lasts approximately 2 to 4 hours  · Your doctor may adjust the dose and how often you receive it, but typically it is given once every 8 weeks  · It may take up to 8 weeks after starting this medication to see an improvement in your symptoms  However, a lot of people see improvement sooner  · During the infusion (less than 24 hours after the infusion) some patients experience a reaction, which can include fever, chest pain, heart palpitations, sweating, nausea, flushing, itching, changes in blood pressure, and difficulty breathing  These reactions usually go away by slowing the rate of the infusion or taking medications such as acetaminophen, anti-histamines, steroids, or epinephrine  If you experience any of these during the infusion, let your doctor know immediately  Your doctor will likely recommend you take these medications before your next infusion to decrease the possibility of having another reaction  · A reaction can occur after the infusion (24 hours to 14 days after infusion) as well  Symptoms may include muscle or joint aches, itching, rash, fever, and fatigue   If you experience any of these symptoms, tell your doctor immediately  These symptoms may go away by taking acetaminophen, anti-histamines, or steroids  Your doctor will likely recommend you take these medications before your next infusion to decrease your possibility of having another reaction  · There have been reports of serious infections, including tuberculosis  In addition, anti-TNF medications have been associated with a small risk of lymphoma, an uncommon cancer  · Be sure to get tested for tuberculosis and Hepatitis B before taking this medication  · Before taking this medication, let your doctor know about other medical conditions that you may have or other medications (even over-the-counter medications or alternative therapies) you may be taking  · The best way to control your disease is by taking your medication as directed  Even when you do not have any symptoms, it is very important to continue taking your medication to prevent your disease from becoming active again  Do not alter the amount of the medication or how frequently you take it on your own  · If you have any side effects or you continue to have symptoms, speak to your doctor immediately  For further information, please check out [email or web address]  [email or web address]/ or follow this link:   [email or web address]  pdf

## 2021-07-07 NOTE — TELEPHONE ENCOUNTER
Hi,    Can we get her started on Remicade? I entered the orders and quant gold up to date  Can we also set her up with the assistance program once approved?     Thank you

## 2021-07-08 LAB
LAB AP GYN PRIMARY INTERPRETATION: NORMAL
Lab: NORMAL

## 2021-07-11 ENCOUNTER — OFFICE VISIT (OUTPATIENT)
Dept: URGENT CARE | Facility: MEDICAL CENTER | Age: 26
End: 2021-07-11
Payer: COMMERCIAL

## 2021-07-11 VITALS
HEIGHT: 66 IN | HEART RATE: 91 BPM | WEIGHT: 180 LBS | RESPIRATION RATE: 20 BRPM | TEMPERATURE: 99.6 F | BODY MASS INDEX: 28.93 KG/M2 | OXYGEN SATURATION: 99 %

## 2021-07-11 DIAGNOSIS — R05.9 COUGH: Primary | ICD-10-CM

## 2021-07-11 PROCEDURE — 99213 OFFICE O/P EST LOW 20 MIN: CPT | Performed by: PHYSICIAN ASSISTANT

## 2021-07-11 RX ORDER — BENZONATATE 100 MG/1
100 CAPSULE ORAL 3 TIMES DAILY PRN
Qty: 20 CAPSULE | Refills: 0 | Status: SHIPPED | OUTPATIENT
Start: 2021-07-11 | End: 2021-09-09 | Stop reason: ALTCHOICE

## 2021-07-11 NOTE — PROGRESS NOTES
[facility]        NAME: Edward Salazar is a 23 y o  female  : 1995    MRN: [id number]  DATE: 2021  TIME: 3:02 PM    Assessment and Plan   Cough [R05]  1  Cough  benzonatate (TESSALON PERLES) 100 mg capsule         Patient Instructions     Tessalon Perles for cough  Follow up with PCP in 3-5 days  Proceed to  ER if symptoms worsen  Chief Complaint     Chief Complaint   Patient presents with    Cough     cough since Thursday  sore throat with cough only  denies fever         History of Present Illness        Patient is a 18-year-old female who presents today with complaints of cough for the past 4 days  No fevers or chills  No runny nose or congestion  No actual shortness of breath  Niece and nephew just had croup and she has been around them  Has been taking Mucinex with little relief  Is fully COVID vaccinated  Review of Systems   Review of Systems   Constitutional: Negative for chills and fever  HENT: Positive for sore throat  Negative for congestion, ear pain and rhinorrhea  Respiratory: Positive for cough  Negative for shortness of breath and wheezing  Cardiovascular: Negative for chest pain  Musculoskeletal: Negative for myalgias           Current Medications       Current Outpatient Medications:     adalimumab (HUMIRA) 40 mg/0 8 mL PSKT, Inject 1 6 mL (80 mg total) under the skin once a week, Disp: 16 each, Rfl: 5    drospirenone-ethinyl estradiol (OTTONIEL) 3-0 02 MG per tablet, Take 1 tablet by mouth daily, Disp: 90 tablet, Rfl: 4    escitalopram (LEXAPRO) 20 mg tablet, Take 1 tablet (20 mg total) by mouth daily, Disp: 90 tablet, Rfl: 1    ferrous sulfate 324 (65 Fe) mg, Take 1 tablet (324 mg total) by mouth 2 (two) times a day before meals, Disp: 60 tablet, Rfl: 0    ondansetron (ZOFRAN-ODT) 4 mg disintegrating tablet, Take 1 tablet (4 mg total) by mouth every 6 (six) hours as needed for nausea or vomiting, Disp: 20 tablet, Rfl: 0    predniSONE 10 mg tablet, Take 4 tablets (40 mg total) by mouth daily for 7 days, THEN 3 5 tablets (35 mg total) daily for 7 days, THEN 3 tablets (30 mg total) daily for 7 days, THEN 2 5 tablets (25 mg total) daily for 7 days, THEN 2 tablets (20 mg total) daily for 7 days, THEN 1 5 tablets (15 mg total) daily for 7 days, THEN 1 tablet (10 mg total) daily for 7 days, THEN 0 5 tablets (5 mg total) daily for 7 days  , Disp: 126 tablet, Rfl: 0    scopolamine (TRANSDERM-SCOP) 1 5 mg/3 days TD 72 hr patch, Place 1 patch on the skin every third day, Disp: 10 patch, Rfl: 0    benzonatate (TESSALON PERLES) 100 mg capsule, Take 1 capsule (100 mg total) by mouth 3 (three) times a day as needed for cough, Disp: 20 capsule, Rfl: 0    mupirocin (BACTROBAN) 2 % ointment, Apply topically 3 (three) times a day (Patient not taking: Reported on 7/11/2021), Disp: 22 g, Rfl: 0    Current Allergies     Allergies as of 07/11/2021 - Reviewed 07/11/2021   Allergen Reaction Noted    Penicillins Rash 03/26/2019            The following portions of the patient's history were reviewed and updated as appropriate: allergies, current medications, past family history, past medical history, past social history, past surgical history and problem list      Past Medical History:   Diagnosis Date    [facility]'s colitis ([facility] )     Crohn's disease ([facility] )        No past surgical history on file  Family History   Problem Relation Age of Onset    Breast cancer Maternal Grandmother          Medications have been verified  Objective   Pulse 91   Temp 99 6 °F (37 6 °C) (Temporal)   Resp 20   Ht 5' 6" (1 676 m)   Wt 81 6 kg (180 lb)   LMP 06/20/2021 (Exact Date)   SpO2 99%   BMI 29 05 kg/m²        Physical Exam     Physical Exam  Constitutional:       General: She is not in acute distress  Appearance: Normal appearance  She is not ill-appearing  HENT:      Head: Normocephalic and atraumatic        Right Ear: Tympanic membrane and ear canal normal       Left Ear: Tympanic membrane and ear canal normal       Nose: No congestion or rhinorrhea  Mouth/Throat:      Mouth: Mucous membranes are moist       Pharynx: Oropharynx is clear  No posterior oropharyngeal erythema  Cardiovascular:      Rate and Rhythm: Normal rate and regular rhythm  Pulmonary:      Effort: Pulmonary effort is normal  No respiratory distress  Breath sounds: Normal breath sounds  No stridor  No wheezing, rhonchi or rales  Chest:      Chest wall: No tenderness  Skin:     General: Skin is warm and dry  Neurological:      Mental Status: She is alert

## 2021-07-18 ENCOUNTER — TELEPHONE (OUTPATIENT)
Dept: GASTROENTEROLOGY | Facility: CLINIC | Age: 26
End: 2021-07-18

## 2021-07-18 NOTE — TELEPHONE ENCOUNTER
I submitted BIF to [facility] [place] by faxing to     I scan form into pts chart    I sent BIF form via  e-mail to pec team

## 2021-07-29 NOTE — TELEPHONE ENCOUNTER
I called Oleg[facility] Palma we still havent received response form    I was advised response form was sent to our fax     I asked front reception I was advised nothing has been received from [facility] since 7-22-21    I asked for response to be faxed to back fax in our [facility] office

## 2021-08-05 ENCOUNTER — TELEPHONE (OUTPATIENT)
Dept: GASTROENTEROLOGY | Facility: CLINIC | Age: 26
End: 2021-08-05

## 2021-08-05 DIAGNOSIS — K50.119 CROHN'S DISEASE OF COLON WITH COMPLICATION (HCC): Primary | ICD-10-CM

## 2021-08-05 NOTE — TELEPHONE ENCOUNTER
Message from [address] [place] [address] St a call from [facility] and I spoke with Elena Hollingsowrth she stated that the approval is not based on the units or visits is approval for the allowed time given which is 8/3/2021 to 11/1/2021 after the approval expires then patient will need to transition to different site of care besides the facility theophylline approval is for buy and bill for AL infusion      I called [address] spoke with Mickiel Kussmaul We harshad'd patient for :    8-23-21  10am  9-8-21    8am  10-4-21  10 am    Please change dates     I called and spoke with Killian Burrell, we went over dates/time location  I explained to Patient after induction doses she will be home infused  we will reach out to Rosa Ingram to facilitate VNA services      Patient understood no f u questions has my direct line with any questions

## 2021-08-11 ENCOUNTER — OFFICE VISIT (OUTPATIENT)
Dept: URGENT CARE | Age: 26
End: 2021-08-11
Payer: COMMERCIAL

## 2021-08-11 ENCOUNTER — NURSE TRIAGE (OUTPATIENT)
Dept: OTHER | Facility: OTHER | Age: 26
End: 2021-08-11

## 2021-08-11 VITALS — TEMPERATURE: 97.8 F | RESPIRATION RATE: 14 BRPM | OXYGEN SATURATION: 97 % | HEART RATE: 116 BPM

## 2021-08-11 DIAGNOSIS — B34.9 VIRAL INFECTION: Primary | ICD-10-CM

## 2021-08-11 PROCEDURE — U0005 INFEC AGEN DETEC AMPLI PROBE: HCPCS | Performed by: PHYSICIAN ASSISTANT

## 2021-08-11 PROCEDURE — U0003 INFECTIOUS AGENT DETECTION BY NUCLEIC ACID (DNA OR RNA); SEVERE ACUTE RESPIRATORY SYNDROME CORONAVIRUS 2 (SARS-COV-2) (CORONAVIRUS DISEASE [COVID-19]), AMPLIFIED PROBE TECHNIQUE, MAKING USE OF HIGH THROUGHPUT TECHNOLOGIES AS DESCRIBED BY CMS-2020-01-R: HCPCS | Performed by: PHYSICIAN ASSISTANT

## 2021-08-11 PROCEDURE — 99213 OFFICE O/P EST LOW 20 MIN: CPT | Performed by: PHYSICIAN ASSISTANT

## 2021-08-11 NOTE — TELEPHONE ENCOUNTER
Patient denies any known exposure to COVID or anyone sick  Patient is concerned because of her history of Chron's  Patient was advised to follow up with PCP's office if she is concerned about COVID  Reason for Disposition   Fever > 100 0 F (37 8 C) and diabetes mellitus or a weak immune system (e g , HIV positive, chemotherapy, splenectomy)    Answer Assessment - Initial Assessment Questions  1  TEMPERATURE: "What is the most recent temperature?"  "How was it measured?"       "It was 102 5 orally yesterday  It broke down to 100 6 orally today "   2  ONSET: "When did the fever start?"       Last night around 6:30pm    3  SYMPTOMS: "Do you have any other symptoms besides the fever?"  (e g , colds, headache, sore throat, earache, cough, rash, diarrhea, vomiting, abdominal pain)      "Aside from my regular Chron's bathroom issues  No nausea of vomiting ", headache   4  CAUSE: If there are no symptoms, ask: "What do you think is causing the fever?"       Denies   5  CONTACTS: "Does anyone else in the family have an infection?"      Denies   6  TREATMENT: "What have you done so far to treat this fever?" (e g , medications)      "I've nee taking tylenol every 6 hours "   7  IMMUNOCOMPROMISE: "Do you have of the following: diabetes, HIV positive, splenectomy, cancer chemotherapy, chronic steroid treatment, transplant patient, etc "      Chrons   8  PREGNANCY: "Is there any chance you are pregnant?" "When was your last menstrual period?"      Denies   9  TRAVEL: "Have you traveled out of the country in the last month?" (e g , travel history, exposures)      Denies    Protocols used:  FEVER-ADULT-[place]

## 2021-08-11 NOTE — PATIENT INSTRUCTIONS

## 2021-08-11 NOTE — PROGRESS NOTES
[facility]        NAME: Waldemar Seip is a 23 y o  female  : 1995    MRN: [id number]  DATE: 2021  TIME: 2:34 PM    Assessment and Plan   Viral infection [B34 9]  1  Viral infection  Novel Coronavirus (Covid-19),PCR [facility] - Office Collection         Patient Instructions       Follow up with PCP in 3-5 days  Proceed to  ER if symptoms worsen  Chief Complaint     Chief Complaint   Patient presents with    Fever     since last night; max 102 5 (last night)     Chills    Headache    Vomiting         History of Present Illness         Patient presents with a fever, chills, body ache that started yesterday  She has her fever was 102  She is taking Tylenol and ibuprofen  She did get car sick and vomited on the way over here but otherwise has felt okay  She denies any COVID exposure  She is vaccinated for COVID      Review of Systems   Review of Systems   Constitutional: Positive for chills, fatigue and fever  HENT: Negative  Respiratory: Negative  Cardiovascular: Negative  Gastrointestinal: Positive for nausea and vomiting  Musculoskeletal: Positive for myalgias  Neurological: Positive for headaches  Psychiatric/Behavioral: Negative            Current Medications       Current Outpatient Medications:     adalimumab (HUMIRA) 40 mg/0 8 mL PSKT, Inject 1 6 mL (80 mg total) under the skin once a week, Disp: 16 each, Rfl: 5    benzonatate (TESSALON PERLES) 100 mg capsule, Take 1 capsule (100 mg total) by mouth 3 (three) times a day as needed for cough (Patient not taking: Reported on 2021), Disp: 20 capsule, Rfl: 0    drospirenone-ethinyl estradiol (OTTONIEL) 3-0 02 MG per tablet, Take 1 tablet by mouth daily, Disp: 90 tablet, Rfl: 4    escitalopram (LEXAPRO) 20 mg tablet, Take 1 tablet (20 mg total) by mouth daily, Disp: 90 tablet, Rfl: 1    ferrous sulfate 324 (65 Fe) mg, Take 1 tablet (324 mg total) by mouth 2 (two) times a day before meals, Disp: 60 tablet, Rfl: 0    mupirocin (BACTROBAN) 2 % ointment, Apply topically 3 (three) times a day (Patient not taking: Reported on 7/11/2021), Disp: 22 g, Rfl: 0    ondansetron (ZOFRAN-ODT) 4 mg disintegrating tablet, Take 1 tablet (4 mg total) by mouth every 6 (six) hours as needed for nausea or vomiting, Disp: 20 tablet, Rfl: 0    predniSONE 10 mg tablet, Take 4 tablets (40 mg total) by mouth daily for 7 days, THEN 3 5 tablets (35 mg total) daily for 7 days, THEN 3 tablets (30 mg total) daily for 7 days, THEN 2 5 tablets (25 mg total) daily for 7 days, THEN 2 tablets (20 mg total) daily for 7 days, THEN 1 5 tablets (15 mg total) daily for 7 days, THEN 1 tablet (10 mg total) daily for 7 days, THEN 0 5 tablets (5 mg total) daily for 7 days  , Disp: 126 tablet, Rfl: 0    scopolamine (TRANSDERM-SCOP) 1 5 mg/3 days TD 72 hr patch, Place 1 patch on the skin every third day, Disp: 10 patch, Rfl: 0    Current Allergies     Allergies as of 08/11/2021 - Reviewed 08/11/2021   Allergen Reaction Noted    Penicillins Rash 03/26/2019            The following portions of the patient's history were reviewed and updated as appropriate: allergies, current medications, past family history, past medical history, past social history, past surgical history and problem list      Past Medical History:   Diagnosis Date    [facility]     [facility] disease [facility] )        History reviewed  No pertinent surgical history  Family History   Problem Relation Age of Onset    Breast cancer Maternal Grandmother          Medications have been verified  Objective   Pulse (!) 116   Temp 97 8 °F (36 6 °C)   Resp 14   SpO2 97%        Physical Exam     Physical Exam  Vitals and nursing note reviewed  Constitutional:       General: She is not in acute distress  Appearance: Normal appearance  She is not ill-appearing or toxic-appearing  HENT:      Head: Normocephalic and atraumatic        Right Ear: Tympanic membrane and ear canal normal  Left Ear: Tympanic membrane and ear canal normal       Nose: Nose normal       Mouth/Throat:      Mouth: Mucous membranes are moist       Pharynx: No posterior oropharyngeal erythema  Cardiovascular:      Rate and Rhythm: Normal rate and regular rhythm  Pulses: Normal pulses  Heart sounds: Normal heart sounds  Pulmonary:      Effort: Pulmonary effort is normal       Breath sounds: Normal breath sounds  No wheezing  Abdominal:      General: Abdomen is flat  Bowel sounds are normal       Tenderness: There is no abdominal tenderness  Lymphadenopathy:      Cervical: No cervical adenopathy  Skin:     General: Skin is warm and dry  Neurological:      General: No focal deficit present  Mental Status: She is alert and oriented to person, place, and time     Psychiatric:         Mood and Affect: Mood normal          Behavior: Behavior normal

## 2021-08-11 NOTE — TELEPHONE ENCOUNTER
Patient has no worsening in her GI symptoms associated with her fevers, the same as visit 7/7/21  Given severity I recommended PCP visit however she states they could not see her until tomorrow  I recommended urgent care visit  Patient will keep us updated after urgent care visit

## 2021-08-11 NOTE — TELEPHONE ENCOUNTER
Regarding: Fever  ----- Message from Obi Davey sent at 8/11/2021  9:59 AM EDT -----  " I have been fighting a fever and I am not sure what the MD would recommend for me "

## 2021-08-12 LAB — SARS-COV-2 RNA RESP QL NAA+PROBE: NEGATIVE

## 2021-08-23 ENCOUNTER — HOSPITAL ENCOUNTER (OUTPATIENT)
Dept: INFUSION CENTER | Facility: CLINIC | Age: 26
Discharge: HOME | End: 2021-08-23
Payer: COMMERCIAL

## 2021-08-23 VITALS
DIASTOLIC BLOOD PRESSURE: 91 MMHG | SYSTOLIC BLOOD PRESSURE: 142 MMHG | HEART RATE: 89 BPM | RESPIRATION RATE: 18 BRPM | BODY MASS INDEX: 29.39 KG/M2 | TEMPERATURE: 97.5 F | WEIGHT: 182.1 LBS

## 2021-08-23 DIAGNOSIS — K50.119 CROHN'S DISEASE OF COLON WITH COMPLICATION (HCC): Primary | ICD-10-CM

## 2021-08-23 PROCEDURE — 96413 CHEMO IV INFUSION 1 HR: CPT

## 2021-08-23 PROCEDURE — 96375 TX/PRO/DX INJ NEW DRUG ADDON: CPT

## 2021-08-23 PROCEDURE — 96415 CHEMO IV INFUSION ADDL HR: CPT

## 2021-08-23 RX ORDER — ACETAMINOPHEN 325 MG/1
650 TABLET ORAL ONCE
Status: CANCELLED | OUTPATIENT
Start: 2021-09-06

## 2021-08-23 RX ORDER — ACETAMINOPHEN 325 MG/1
650 TABLET ORAL ONCE
Status: CANCELLED | OUTPATIENT
Start: 2021-09-08

## 2021-08-23 RX ORDER — DIPHENHYDRAMINE HCL 25 MG
25 TABLET ORAL ONCE
Status: COMPLETED | OUTPATIENT
Start: 2021-08-23 | End: 2021-08-23

## 2021-08-23 RX ORDER — SODIUM CHLORIDE 9 MG/ML
20 INJECTION, SOLUTION INTRAVENOUS ONCE
Status: CANCELLED | OUTPATIENT
Start: 2021-09-06

## 2021-08-23 RX ORDER — METHYLPREDNISOLONE SODIUM SUCCINATE 40 MG/ML
40 INJECTION, POWDER, LYOPHILIZED, FOR SOLUTION INTRAMUSCULAR; INTRAVENOUS ONCE
Status: CANCELLED | OUTPATIENT
Start: 2021-09-08

## 2021-08-23 RX ORDER — SODIUM CHLORIDE 9 MG/ML
20 INJECTION, SOLUTION INTRAVENOUS ONCE
Status: COMPLETED | OUTPATIENT
Start: 2021-08-23 | End: 2021-08-23

## 2021-08-23 RX ORDER — ACETAMINOPHEN 325 MG/1
650 TABLET ORAL ONCE
Status: COMPLETED | OUTPATIENT
Start: 2021-08-23 | End: 2021-08-23

## 2021-08-23 RX ORDER — DIPHENHYDRAMINE HCL 25 MG
25 TABLET ORAL ONCE
Status: CANCELLED | OUTPATIENT
Start: 2021-09-06

## 2021-08-23 RX ORDER — SODIUM CHLORIDE 9 MG/ML
20 INJECTION, SOLUTION INTRAVENOUS ONCE
Status: CANCELLED | OUTPATIENT
Start: 2021-09-08

## 2021-08-23 RX ORDER — METHYLPREDNISOLONE SODIUM SUCCINATE 40 MG/ML
40 INJECTION, POWDER, LYOPHILIZED, FOR SOLUTION INTRAMUSCULAR; INTRAVENOUS ONCE
Status: COMPLETED | OUTPATIENT
Start: 2021-08-23 | End: 2021-08-23

## 2021-08-23 RX ORDER — METHYLPREDNISOLONE SODIUM SUCCINATE 40 MG/ML
40 INJECTION, POWDER, LYOPHILIZED, FOR SOLUTION INTRAMUSCULAR; INTRAVENOUS ONCE
Status: CANCELLED | OUTPATIENT
Start: 2021-09-06

## 2021-08-23 RX ORDER — DIPHENHYDRAMINE HCL 25 MG
25 TABLET ORAL ONCE
Status: CANCELLED | OUTPATIENT
Start: 2021-09-08

## 2021-08-23 RX ADMIN — METHYLPREDNISOLONE SODIUM SUCCINATE 40 MG: 40 INJECTION, POWDER, FOR SOLUTION INTRAMUSCULAR; INTRAVENOUS at 10:56

## 2021-08-23 RX ADMIN — INFLIXIMAB 400 MG: 100 INJECTION, POWDER, LYOPHILIZED, FOR SOLUTION INTRAVENOUS at 11:49

## 2021-08-23 RX ADMIN — ACETAMINOPHEN 650 MG: 325 TABLET, FILM COATED ORAL at 10:13

## 2021-08-23 RX ADMIN — DIPHENHYDRAMINE HCL 25 MG: 25 TABLET, COATED ORAL at 10:13

## 2021-08-23 RX ADMIN — SODIUM CHLORIDE 20 ML/HR: 0.9 INJECTION, SOLUTION INTRAVENOUS at 10:50

## 2021-08-23 NOTE — PROGRESS NOTES
Pt  Denied new symptoms or concerns today  Remicade completed without adverse event  Future appointments reviewed  AVS provided

## 2021-08-25 DIAGNOSIS — K50.119 CROHN'S DISEASE OF COLON WITH COMPLICATION (HCC): Primary | ICD-10-CM

## 2021-08-25 RX ORDER — PREDNISONE 1 MG/1
TABLET ORAL
Qty: 60 TABLET | Refills: 0 | Status: SHIPPED | OUTPATIENT
Start: 2021-08-25 | End: 2021-09-09 | Stop reason: ALTCHOICE

## 2021-09-08 ENCOUNTER — HOSPITAL ENCOUNTER (OUTPATIENT)
Dept: INFUSION CENTER | Facility: CLINIC | Age: 26
Discharge: HOME | End: 2021-09-08
Payer: COMMERCIAL

## 2021-09-08 VITALS
BODY MASS INDEX: 30.12 KG/M2 | DIASTOLIC BLOOD PRESSURE: 83 MMHG | HEART RATE: 90 BPM | TEMPERATURE: 97.6 F | SYSTOLIC BLOOD PRESSURE: 123 MMHG | RESPIRATION RATE: 18 BRPM | WEIGHT: 186.62 LBS

## 2021-09-08 DIAGNOSIS — K50.119 CROHN'S DISEASE OF COLON WITH COMPLICATION (HCC): Primary | ICD-10-CM

## 2021-09-08 PROCEDURE — 96375 TX/PRO/DX INJ NEW DRUG ADDON: CPT

## 2021-09-08 PROCEDURE — 96415 CHEMO IV INFUSION ADDL HR: CPT

## 2021-09-08 PROCEDURE — 96413 CHEMO IV INFUSION 1 HR: CPT

## 2021-09-08 RX ORDER — DIPHENHYDRAMINE HCL 25 MG
25 TABLET ORAL ONCE
Status: CANCELLED | OUTPATIENT
Start: 2021-10-04

## 2021-09-08 RX ORDER — METHYLPREDNISOLONE SODIUM SUCCINATE 40 MG/ML
40 INJECTION, POWDER, LYOPHILIZED, FOR SOLUTION INTRAMUSCULAR; INTRAVENOUS ONCE
Status: COMPLETED | OUTPATIENT
Start: 2021-09-08 | End: 2021-09-08

## 2021-09-08 RX ORDER — ACETAMINOPHEN 325 MG/1
650 TABLET ORAL ONCE
Status: CANCELLED | OUTPATIENT
Start: 2021-09-20

## 2021-09-08 RX ORDER — METHYLPREDNISOLONE SODIUM SUCCINATE 40 MG/ML
40 INJECTION, POWDER, LYOPHILIZED, FOR SOLUTION INTRAMUSCULAR; INTRAVENOUS ONCE
Status: CANCELLED | OUTPATIENT
Start: 2021-09-20

## 2021-09-08 RX ORDER — SODIUM CHLORIDE 9 MG/ML
20 INJECTION, SOLUTION INTRAVENOUS ONCE
Status: CANCELLED | OUTPATIENT
Start: 2021-09-20

## 2021-09-08 RX ORDER — SODIUM CHLORIDE 9 MG/ML
20 INJECTION, SOLUTION INTRAVENOUS ONCE
Status: COMPLETED | OUTPATIENT
Start: 2021-09-08 | End: 2021-09-08

## 2021-09-08 RX ORDER — METHYLPREDNISOLONE SODIUM SUCCINATE 40 MG/ML
40 INJECTION, POWDER, LYOPHILIZED, FOR SOLUTION INTRAMUSCULAR; INTRAVENOUS ONCE
Status: CANCELLED | OUTPATIENT
Start: 2021-10-04

## 2021-09-08 RX ORDER — ACETAMINOPHEN 325 MG/1
650 TABLET ORAL ONCE
Status: CANCELLED | OUTPATIENT
Start: 2021-10-04

## 2021-09-08 RX ORDER — DIPHENHYDRAMINE HCL 25 MG
25 TABLET ORAL ONCE
Status: CANCELLED | OUTPATIENT
Start: 2021-09-20

## 2021-09-08 RX ORDER — ACETAMINOPHEN 325 MG/1
650 TABLET ORAL ONCE
Status: COMPLETED | OUTPATIENT
Start: 2021-09-08 | End: 2021-09-08

## 2021-09-08 RX ORDER — SODIUM CHLORIDE 9 MG/ML
20 INJECTION, SOLUTION INTRAVENOUS ONCE
Status: CANCELLED | OUTPATIENT
Start: 2021-10-04

## 2021-09-08 RX ORDER — DIPHENHYDRAMINE HCL 25 MG
25 TABLET ORAL ONCE
Status: COMPLETED | OUTPATIENT
Start: 2021-09-08 | End: 2021-09-08

## 2021-09-08 RX ADMIN — ACETAMINOPHEN 650 MG: 325 TABLET, FILM COATED ORAL at 08:34

## 2021-09-08 RX ADMIN — DIPHENHYDRAMINE HCL 25 MG: 25 TABLET, COATED ORAL at 08:34

## 2021-09-08 RX ADMIN — SODIUM CHLORIDE 20 ML/HR: 0.9 INJECTION, SOLUTION INTRAVENOUS at 08:20

## 2021-09-08 RX ADMIN — METHYLPREDNISOLONE SODIUM SUCCINATE 40 MG: 40 INJECTION, POWDER, FOR SOLUTION INTRAMUSCULAR; INTRAVENOUS at 08:34

## 2021-09-08 RX ADMIN — INFLIXIMAB 400 MG: 100 INJECTION, POWDER, LYOPHILIZED, FOR SOLUTION INTRAVENOUS at 09:05

## 2021-09-09 ENCOUNTER — OFFICE VISIT (OUTPATIENT)
Dept: FAMILY MEDICINE CLINIC | Facility: CLINIC | Age: 26
End: 2021-09-09
Payer: COMMERCIAL

## 2021-09-09 VITALS
OXYGEN SATURATION: 97 % | HEART RATE: 76 BPM | SYSTOLIC BLOOD PRESSURE: 128 MMHG | WEIGHT: 187.5 LBS | HEIGHT: 66 IN | RESPIRATION RATE: 18 BRPM | DIASTOLIC BLOOD PRESSURE: 88 MMHG | TEMPERATURE: 97.1 F | BODY MASS INDEX: 30.13 KG/M2

## 2021-09-09 DIAGNOSIS — L01.00 IMPETIGO: ICD-10-CM

## 2021-09-09 PROCEDURE — 1036F TOBACCO NON-USER: CPT | Performed by: FAMILY MEDICINE

## 2021-09-09 PROCEDURE — 3008F BODY MASS INDEX DOCD: CPT | Performed by: FAMILY MEDICINE

## 2021-09-09 PROCEDURE — 99213 OFFICE O/P EST LOW 20 MIN: CPT | Performed by: FAMILY MEDICINE

## 2021-09-09 NOTE — PROGRESS NOTES
FAMILY MEDICINE PROGRESS NOTE    Date of Service: 21  Primary Care Provider:   Pj Alcala MD       Name: Wally Bailey       : 1995       Age:26 y o  Sex: female      MRN: [id number]      Chief Complaint:Rash (rash on face around mouth for 6 days)       ASSESSMENT and PLAN:  Wally Bailey is a 23 y o  female with:     Problem List Items Addressed This Visit     None      Visit Diagnoses     Impetigo        Relevant Medications    mupirocin (BACTROBAN) 2 % ointment        Will treat impetigo with mupirocin  SUBJECTIVE:  Wally Bailey is a 23 y o  female who presents today with a chief complaint of Rash (rash on face around mouth for 6 days)  HPI     She reports that she has a facial rash  She has this rash this time last year  The rash started a week go  She denies new exposures  She tried OTC hydrocortisone cream which did not help  She denies fevers, chills, numbness  She was diagnosed with impetigo last year  Review of Systems   Skin: Positive for rash  I have reviewed the patient's Past Medical History      Current Outpatient Medications:     drospirenone-ethinyl estradiol (OTTONIEL) 3-0 02 MG per tablet, Take 1 tablet by mouth daily, Disp: 90 tablet, Rfl: 4    escitalopram (LEXAPRO) 20 mg tablet, Take 1 tablet (20 mg total) by mouth daily, Disp: 90 tablet, Rfl: 1    ferrous sulfate 324 (65 Fe) mg, Take 1 tablet (324 mg total) by mouth 2 (two) times a day before meals, Disp: 60 tablet, Rfl: 0    ondansetron (ZOFRAN-ODT) 4 mg disintegrating tablet, Take 1 tablet (4 mg total) by mouth every 6 (six) hours as needed for nausea or vomiting, Disp: 20 tablet, Rfl: 0    scopolamine (TRANSDERM-SCOP) 1 5 mg/3 days TD 72 hr patch, Place 1 patch on the skin every third day, Disp: 10 patch, Rfl: 0    mupirocin (BACTROBAN) 2 % ointment, Apply topically 3 (three) times a day for 7 days, Disp: 22 g, Rfl: 0    OBJECTIVE:  /88 (BP Location: Left arm, Patient Position: Sitting, Cuff Size: Standard)   Pulse 76   Temp (!) 97 1 °F (36 2 °C)   Resp 18   Ht 5' 6" (1 676 m)   Wt 85 kg (187 lb 8 oz)   LMP 08/22/2021   SpO2 97%   Breastfeeding No   BMI 30 26 kg/m²    BP Readings from Last 3 Encounters:   09/09/21 128/88   09/08/21 123/83   08/23/21 142/91      Wt Readings from Last 3 Encounters:   09/09/21 85 kg (187 lb 8 oz)   09/08/21 84 6 kg (186 lb 9 9 oz)   08/23/21 82 6 kg (182 lb 1 6 oz)      Physical Exam  Constitutional:       General: She is not in acute distress  Appearance: Normal appearance  She is not ill-appearing or toxic-appearing  HENT:      Head: Normocephalic and atraumatic  Comments: Papular rash around mouth, small pustules present  No drainage      Nose: Nose normal       Mouth/Throat:      Mouth: Mucous membranes are moist    Neurological:      Mental Status: She is alert  Return if symptoms worsen or fail to improve  Ruben Moses MD    Note: Portions of the record have been created with voice recognition software  Occasional wrong word or "sound a like" substitutions may have occurred due to the inherent limitations of voice recognition software  Read the chart carefully and recognize, using context, where substitutions have occurred

## 2021-09-09 NOTE — PATIENT INSTRUCTIONS
Impetigo   AMBULATORY CARE:   Impetigo  is a skin infection caused by bacteria  The infection can cause sores to form anywhere on your body  The sores develop watery or pus-filled blisters that break and form thick crusts  Impetigo is most common in children and spreads easily from person to person  Seek care immediately if:   · You have painful, red, warm skin around the blisters  · Your face is swollen  · You urinate less than usual or there is blood in your urine  Contact your healthcare provider if:   · You have a fever  · The sores become more red, swollen, warm, or tender  · The sores do not start to heal after 3 days of treatment  · You have questions or concerns about your condition or care  Treatment for impetigo  includes antibiotics to treat the bacterial infection  Antibiotics may be given as a pill or cream  Wash your skin and gently remove any crusts before you apply the antibiotic cream   Clean your sores safely:  Wash your skin sores with antibacterial soap and water  You may need to do this 2 to 3 times each day until the sores heal  If the area is crusted, gently wash the sores with gauze or a clean washcloth to remove the crust  Pat the area dry with a clean towel  Wash your hands, the washcloth, and the towel after you clean the area around the sores  Prevent the spread of impetigo:   · Avoid direct contact  You can spread impetigo if someone touches or uses something that touched your infected skin  You can also spread impetigo on your own body when you touch the area and then touch somewhere else  Keep the sores covered with gauze so you will not scratch or touch them  Keep your fingernails short  Your child may need to wear mittens so he does not scratch his sores  · Wash your hands often  Always wash your hands after you touch the infected area  Wash your hands before you touch food, your eyes, or other people   If no water is available, use an alcohol-based gel to clean your hands  · Wash household items  Do not share or reuse items that have come in contact with impetigo sores  Examples include bedding, towels, washcloths, and eating utensils  These items may be used again after they have been washed with hot water and soap  Return to work or school: You may return to work or school 48 hours after you start the antibiotic medicine  If your child has impetigo, tell his school or  center about the infection  Follow up with your healthcare provider as directed:  Write down your questions so you remember to ask them during your visits  © Copyright [employer] 2021 Information is for End User's use only and may not be sold, redistributed or otherwise used for commercial purposes  All illustrations and images included in CareNotes® are the copyrighted property of A D A M , Inc  or Ethel Hurley  The above information is an  only  It is not intended as medical advice for individual conditions or treatments  Talk to your doctor, nurse or pharmacist before following any medical regimen to see if it is safe and effective for you

## 2021-09-28 DIAGNOSIS — T75.3XXA SEVERE MOTION SICKNESS, INITIAL ENCOUNTER: ICD-10-CM

## 2021-09-28 RX ORDER — ONDANSETRON 4 MG/1
4 TABLET, ORALLY DISINTEGRATING ORAL EVERY 6 HOURS PRN
Qty: 20 TABLET | Refills: 0 | Status: SHIPPED | OUTPATIENT
Start: 2021-09-28 | End: 2021-12-15 | Stop reason: SDUPTHER

## 2021-09-28 RX ORDER — SCOLOPAMINE TRANSDERMAL SYSTEM 1 MG/1
1 PATCH, EXTENDED RELEASE TRANSDERMAL
Qty: 10 PATCH | Refills: 0 | Status: SHIPPED | OUTPATIENT
Start: 2021-09-28 | End: 2021-12-15 | Stop reason: SDUPTHER

## 2021-10-04 ENCOUNTER — HOSPITAL ENCOUNTER (OUTPATIENT)
Dept: INFUSION CENTER | Facility: CLINIC | Age: 26
Discharge: HOME | End: 2021-10-04
Payer: COMMERCIAL

## 2021-10-04 VITALS
WEIGHT: 190.48 LBS | BODY MASS INDEX: 30.74 KG/M2 | HEART RATE: 96 BPM | RESPIRATION RATE: 18 BRPM | SYSTOLIC BLOOD PRESSURE: 126 MMHG | TEMPERATURE: 97.9 F | DIASTOLIC BLOOD PRESSURE: 83 MMHG

## 2021-10-04 DIAGNOSIS — K50.119 CROHN'S DISEASE OF COLON WITH COMPLICATION (HCC): Primary | ICD-10-CM

## 2021-10-04 PROCEDURE — 96375 TX/PRO/DX INJ NEW DRUG ADDON: CPT

## 2021-10-04 PROCEDURE — 96415 CHEMO IV INFUSION ADDL HR: CPT

## 2021-10-04 PROCEDURE — 96413 CHEMO IV INFUSION 1 HR: CPT

## 2021-10-04 RX ORDER — ACETAMINOPHEN 325 MG/1
650 TABLET ORAL ONCE
Status: COMPLETED | OUTPATIENT
Start: 2021-10-04 | End: 2021-10-04

## 2021-10-04 RX ORDER — SODIUM CHLORIDE 9 MG/ML
20 INJECTION, SOLUTION INTRAVENOUS ONCE
Status: COMPLETED | OUTPATIENT
Start: 2021-10-04 | End: 2021-10-04

## 2021-10-04 RX ORDER — SODIUM CHLORIDE 9 MG/ML
20 INJECTION, SOLUTION INTRAVENOUS ONCE
OUTPATIENT
Start: 2021-11-29

## 2021-10-04 RX ORDER — ACETAMINOPHEN 325 MG/1
650 TABLET ORAL ONCE
OUTPATIENT
Start: 2021-11-29

## 2021-10-04 RX ORDER — METHYLPREDNISOLONE SODIUM SUCCINATE 40 MG/ML
40 INJECTION, POWDER, LYOPHILIZED, FOR SOLUTION INTRAMUSCULAR; INTRAVENOUS ONCE
OUTPATIENT
Start: 2021-11-29

## 2021-10-04 RX ORDER — DIPHENHYDRAMINE HCL 25 MG
25 TABLET ORAL ONCE
OUTPATIENT
Start: 2021-11-29

## 2021-10-04 RX ORDER — METHYLPREDNISOLONE SODIUM SUCCINATE 40 MG/ML
40 INJECTION, POWDER, LYOPHILIZED, FOR SOLUTION INTRAMUSCULAR; INTRAVENOUS ONCE
Status: COMPLETED | OUTPATIENT
Start: 2021-10-04 | End: 2021-10-04

## 2021-10-04 RX ORDER — DIPHENHYDRAMINE HCL 25 MG
25 TABLET ORAL ONCE
Status: COMPLETED | OUTPATIENT
Start: 2021-10-04 | End: 2021-10-04

## 2021-10-04 RX ADMIN — SODIUM CHLORIDE 20 ML/HR: 0.9 INJECTION, SOLUTION INTRAVENOUS at 10:28

## 2021-10-04 RX ADMIN — METHYLPREDNISOLONE SODIUM SUCCINATE 40 MG: 40 INJECTION, POWDER, FOR SOLUTION INTRAMUSCULAR; INTRAVENOUS at 10:35

## 2021-10-04 RX ADMIN — ACETAMINOPHEN 650 MG: 325 TABLET, FILM COATED ORAL at 10:31

## 2021-10-04 RX ADMIN — DIPHENHYDRAMINE HCL 25 MG: 25 TABLET, COATED ORAL at 10:31

## 2021-10-04 RX ADMIN — INFLIXIMAB 432 MG: 100 INJECTION, POWDER, LYOPHILIZED, FOR SOLUTION INTRAVENOUS at 11:01

## 2021-11-01 DIAGNOSIS — F41.9 ANXIETY: ICD-10-CM

## 2021-11-02 RX ORDER — ESCITALOPRAM OXALATE 20 MG/1
20 TABLET ORAL DAILY
Qty: 90 TABLET | Refills: 0 | Status: SHIPPED | OUTPATIENT
Start: 2021-11-02 | End: 2022-01-31 | Stop reason: SDUPTHER

## 2021-11-11 ENCOUNTER — TELEPHONE (OUTPATIENT)
Dept: GASTROENTEROLOGY | Facility: CLINIC | Age: 26
End: 2021-11-11

## 2021-11-11 DIAGNOSIS — K50.119 CROHN'S DISEASE OF COLON WITH COMPLICATION (HCC): Primary | ICD-10-CM

## 2021-11-11 RX ORDER — INFLIXIMAB 100 MG/10ML
5 INJECTION, POWDER, LYOPHILIZED, FOR SOLUTION INTRAVENOUS
Qty: 5 EACH | Refills: 6 | Status: SHIPPED | OUTPATIENT
Start: 2021-11-11 | End: 2022-02-15 | Stop reason: SDUPTHER

## 2021-12-15 DIAGNOSIS — T75.3XXA SEVERE MOTION SICKNESS, INITIAL ENCOUNTER: ICD-10-CM

## 2021-12-15 RX ORDER — ONDANSETRON 4 MG/1
4 TABLET, ORALLY DISINTEGRATING ORAL EVERY 6 HOURS PRN
Qty: 20 TABLET | Refills: 0 | Status: SHIPPED | OUTPATIENT
Start: 2021-12-15 | End: 2022-01-31 | Stop reason: SDUPTHER

## 2021-12-15 RX ORDER — SCOLOPAMINE TRANSDERMAL SYSTEM 1 MG/1
1 PATCH, EXTENDED RELEASE TRANSDERMAL
Qty: 10 PATCH | Refills: 0 | Status: SHIPPED | OUTPATIENT
Start: 2021-12-15 | End: 2022-06-28 | Stop reason: SDUPTHER

## 2022-01-11 ENCOUNTER — TELEPHONE (OUTPATIENT)
Dept: GASTROENTEROLOGY | Facility: MEDICAL CENTER | Age: 27
End: 2022-01-11

## 2022-01-11 NOTE — TELEPHONE ENCOUNTER
----- Message from Roxy Alexander MA sent at 1/11/2022  1:35 PM EST -----  Regarding: FW: Requesting appt    ----- Message -----  From: Domo Edmondson  Sent: 1/10/2022   8:51 PM EST  To: [facility] [place]  Subject: Katlyn Calloway,     I was hoping to make an appt with you as I am starting to experience some symptoms again  Please let me know if this is possible       Thanks,  Neal Sarah

## 2022-01-13 ENCOUNTER — TELEPHONE (OUTPATIENT)
Dept: FAMILY MEDICINE CLINIC | Facility: CLINIC | Age: 27
End: 2022-01-13

## 2022-01-13 DIAGNOSIS — Z20.822 EXPOSURE TO COVID-19 VIRUS: ICD-10-CM

## 2022-01-13 DIAGNOSIS — B34.9 VIRAL INFECTION, UNSPECIFIED: ICD-10-CM

## 2022-01-13 PROCEDURE — U0005 INFEC AGEN DETEC AMPLI PROBE: HCPCS | Performed by: FAMILY MEDICINE

## 2022-01-13 PROCEDURE — U0003 INFECTIOUS AGENT DETECTION BY NUCLEIC ACID (DNA OR RNA); SEVERE ACUTE RESPIRATORY SYNDROME CORONAVIRUS 2 (SARS-COV-2) (CORONAVIRUS DISEASE [COVID-19]), AMPLIFIED PROBE TECHNIQUE, MAKING USE OF HIGH THROUGHPUT TECHNOLOGIES AS DESCRIBED BY CMS-2020-01-R: HCPCS | Performed by: FAMILY MEDICINE
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
## 2022-01-18 ENCOUNTER — TELEPHONE (OUTPATIENT)
Dept: GASTROENTEROLOGY | Facility: MEDICAL CENTER | Age: 27
End: 2022-01-18

## 2022-01-18 ENCOUNTER — OFFICE VISIT (OUTPATIENT)
Dept: GASTROENTEROLOGY | Facility: MEDICAL CENTER | Age: 27
End: 2022-01-18
Payer: COMMERCIAL

## 2022-01-18 VITALS
BODY MASS INDEX: 32.22 KG/M2 | SYSTOLIC BLOOD PRESSURE: 132 MMHG | WEIGHT: 199.6 LBS | HEART RATE: 85 BPM | DIASTOLIC BLOOD PRESSURE: 74 MMHG

## 2022-01-18 DIAGNOSIS — D64.9 ANEMIA, UNSPECIFIED TYPE: ICD-10-CM

## 2022-01-18 DIAGNOSIS — D84.9 IMMUNOCOMPROMISED PATIENT (HCC): ICD-10-CM

## 2022-01-18 DIAGNOSIS — M25.561 ARTHRALGIA OF BOTH KNEES: ICD-10-CM

## 2022-01-18 DIAGNOSIS — K50.119 CROHN'S DISEASE OF COLON WITH COMPLICATION (HCC): Primary | ICD-10-CM

## 2022-01-18 DIAGNOSIS — R19.7 DIARRHEA, UNSPECIFIED TYPE: ICD-10-CM

## 2022-01-18 DIAGNOSIS — M25.562 ARTHRALGIA OF BOTH KNEES: ICD-10-CM

## 2022-01-18 DIAGNOSIS — E53.8 LOW VITAMIN B12 LEVEL: ICD-10-CM

## 2022-01-18 DIAGNOSIS — R21 RASH: ICD-10-CM

## 2022-01-18 PROBLEM — K59.1 FUNCTIONAL DIARRHEA: Status: ACTIVE | Noted: 2022-01-18

## 2022-01-18 PROCEDURE — 99214 OFFICE O/P EST MOD 30 MIN: CPT | Performed by: INTERNAL MEDICINE

## 2022-01-18 PROCEDURE — 1036F TOBACCO NON-USER: CPT | Performed by: INTERNAL MEDICINE

## 2022-01-18 NOTE — PATIENT INSTRUCTIONS
Today we discussed:  Continue Remicade  Repeat blood work the day before you next infusion  I ordered some stool tests as well  Okay to use Imodium  Repeat colonoscopy and schedule endosocpy as well  Limit NSAIDs, avoid smoking  Ongoing routine care (PAP smears, recommend avoid live vaccines, okay to receive flu shot, COVID vaccine and booster, Shingrix, Pneumovax, [id number])  Derm referral for rash  Rheum referral for joint pains  Scheduled date of Colon/EGD (as of today): 04/01/2022  Physician performing: Dr Aidan Jackson  Location of procedure:  [place]  Bowel prep reviewed with patient: Miralax/Dulcolax  Instructions reviewed with patient by: MA  Clearances: 5 day Iron requested Chinedu Saab MD

## 2022-01-18 NOTE — PROGRESS NOTES
[facility] - Outpatient Follow-up Note  Amauri Olson 23 y o  female MRN: [id number]  Encounter: [id number]          ASSESSMENT AND PLAN:    Amauri Olson is a 23 y o  female who presents with complaint of ileocolonic Crohn's disease diagnosed in 2019 who presents for follow-up  She has been on Humira but has not achieved adequate levels despite twice weekly dosing  She was switched to Remicade  She did well with induction but now feels like things may be getting worse, with 6 semi-formed BMs per day and urgency  Disease is mild to moderately active  Blood work from June with CMP that showed albumin of 2 8 and alkaline phosphatase of 135 but otherwise normal LFTs and creatinine  Last CBC in June with hemoglobin 11 2, white blood cell count 8 25, platelets of 774  Last TSH normal   Last C diff testing in July 2021 normal   Last calprotectin 670 July  Last CRP in June was 70 4  Prior Humira drug level 2 7 with anti drug antibodies of 47  QuantiFERON gold negative in June 2021  Last colonoscopy June 2020 with moderate severe to severe pan colitis  1  Cro[facility]'s disease of colon with complication ([facility] )    2  Immunocompromised patient ([facility] )    3  Diarrhea, unspecified type    4  Anemia, unspecified type    5  Low vitamin B12 level    6  Arthralgia of both knees    7   Rash        Orders Placed This Encounter   Procedures    Calprotectin,Fecal    Clostridium difficile toxin by PCR with EIA    CBC and differential    Comprehensive metabolic panel    C-reactive protein    Ferritin    Iron    Transferrin    Vitamin B12    Infliximab Concentration and Anti-Infliximab Antibody    Quantiferon TB Gold Plus    Chronic Hepatitis Panel    Ambulatory Referral to Dermatology    Ambulatory Referral to Rheumatology    Colonoscopy    EGD     Continue Remicade  Repeat blood work including QuantiFERON and Remicade level  Okay to use imodium PRN  Repeat calprotectin   Repeat colonoscopy and schedule endosocp  Avoid NSAIDs, smoking  Ongoing IBD HCM (recommend avoid live vaccines, okay to receive flu shot, COVID vaccine and booster, Shingrix, Pneumovax, [id number])  Routine skin exams, dental exam and eye exams  Derm referral  Rheum referral  PAP smears up to date  ______________________________________________________________________    SUBJECTIVE:    Anna Lobo is a 23 y o  female who presents with complaint of Crohn's here for follow-up  She goes through waves  On the induction of Remicade she did well, now having symptoms again  Currently on Remicade, next infusion early February  6 BMs per day, semi-formed or thuy (sediment), without blood or black stools  + urgency and it happened today  She has a BM and 1 minute later she has to go back  She has a normal stool and then small stools  Some external irritation  Some nocturnal BMs but less frequent  No incontinence    + Rashes (legs and arms, small red marks and 1 small spot on her back within the past 6-8 months), mouth sores (1 currently), joint pains (knees, intermittent)  No derm appointment ever before  No heartburn, dysphagia, odynophagia, nausea (only in the car she has motion sickness), vomiting, abdominal pain, weight loss         Answers for HPI/ROS submitted by the patient on 1/17/2022  When you are not experiencing symptoms of your inflammatory bowel disease, how many bowel movements do you typically have each day?: 2  What is the average (typical) number of bowel movements that you had in a single day during the last week?: 6  Over the last 3 days, have you had any bowel movements where you passed blood without stool?: No  Since your last visit, have you received any vaccinations?: Yes  Since the last visit, have you had an infection?: No  Is there any possibility that you may be pregnant?: No  In the past three months, have you used tobacco in any form?: No  During the last year, how many days have you missed work or school because of your inflammatory bowel disease?: 1  During the last year, how many days have you been hospitalized because of your inflammatory bowel disease?: 0  During the last year, how many days have you visited a hospital emergency department because of your inflammatory bowel disease?: 0  During the last month, have you taken narcotic pain medications (such as Percocet, oxycodone, Oxycontin, morphine, Vicodin, Dilaudid, MS Contin) for your inflammatory bowel disease?: No  Have you awoken at night because you needed to move your bowels during the last month? : Yes  Have you had leakage of stool while sleeping during the last month?: No  Have you had leakage of stool while you were awake during the last month?: No  In the last 6 months, have you unintentionally lost weight?: No  Fever: No  Eye irritation: No  Mouth sores: Yes  Sore throat: No  Chest pain: No  Shortness of breath: No  Numbness or tingling in your hands or feet: No  Skin rash: Yes  Pain or swelling in your joints: Yes  Bruising or bleeding: No  Felt depressed or blue: No        REVIEW OF SYSTEMS IS OTHERWISE NEGATIVE  10 point ROS reviewed and negative, except as above      Historical Information   Past Medical History:   Diagnosis Date    [facility] colitis ([facility] )     Crohn's disease ([facility] )      No past surgical history on file    Social History   Social History     Substance and Sexual Activity   Alcohol Use Yes    Comment: social     Social History     Substance and Sexual Activity   Drug Use Never     Social History     Tobacco Use   Smoking Status Never Smoker   Smokeless Tobacco Never Used     Family History   Problem Relation Age of Onset    Breast cancer Maternal Grandmother        Meds/Allergies       Current Outpatient Medications:     drospirenone-ethinyl estradiol (OTTONIEL) 3-0 02 MG per tablet    escitalopram (LEXAPRO) 20 mg tablet    ferrous sulfate 324 (65 Fe) mg    inFLIXimab (Remicade) 100 mg    ondansetron (ZOFRAN-ODT) 4 mg disintegrating tablet    scopolamine (TRANSDERM-SCOP) 1 mg/3 days TD 72 hr patch    mupirocin (BACTROBAN) 2 % ointment    Allergies   Allergen Reactions    Penicillins Rash           Objective     Blood pressure 132/74, pulse 85, weight 90 5 kg (199 lb 9 6 oz), not currently breastfeeding  Body mass index is 32 22 kg/m²  PHYSICAL EXAMINATION:    General Appearance:   Alert, cooperative, no distress   HEENT:  Normocephalic, atraumatic, anicteric  Neck supple, symmetrical, trachea midline  Lungs:   Equal chest rise and unlabored breathing, normal effort, no coughing  Cardiovascular:   No visualized JVD  Abdomen:   No abdominal distension  Skin:   No jaundice, rashes, or lesions  Musculoskeletal:   Normal range of motion visualized  Psych:  Normal affect and normal insight  Neuro:  Alert and appropriate  Lab Results:   No visits with results within 1 Day(s) from this visit  Latest known visit with results is:   Orders Only on 01/13/2022   Component Date Value    SARS-CoV-2 01/13/2022 Negative        Lab Results   Component Value Date    WBC 8 25 06/30/2021    HGB 11 2 (L) 06/30/2021    HCT 33 7 (L) 06/30/2021    MCV 85 06/30/2021     06/30/2021       Lab Results   Component Value Date    SODIUM 138 06/30/2021    K 3 9 06/30/2021     06/30/2021    CO2 24 06/30/2021    AGAP 6 06/30/2021    BUN 7 06/30/2021    CREATININE 0 81 06/30/2021    GLUC 82 06/28/2021    GLUF 79 06/30/2021    CALCIUM 8 6 06/30/2021    AST 24 06/30/2021    ALT 18 06/30/2021    ALKPHOS 135 (H) 06/30/2021    TP 7 7 06/30/2021    TBILI 0 32 06/30/2021    EGFR 101 06/30/2021       Lab Results   Component Value Date    CRP 70 4 (H) 06/30/2021       Lab Results   Component Value Date    XTG8JBRRBYVM 2 830 06/30/2021       Lab Results   Component Value Date    IRON 18 (L) 06/12/2020    TIBC 241 (L) 06/12/2020    FERRITIN 111 06/12/2020       Radiology Results:   No results found  Attending Attestation (For Attendings USE Only)...

## 2022-01-18 NOTE — TELEPHONE ENCOUNTER
Patient preferred to have procedure scheduled for a Friday @ [facility]  She preferred female provider

## 2022-01-21 NOTE — TELEPHONE ENCOUNTER
Left message for patient to hold Iron 5 days prior to procedure  Left call back number for questions or concerns

## 2022-01-31 ENCOUNTER — TELEPHONE (OUTPATIENT)
Dept: DERMATOLOGY | Facility: CLINIC | Age: 27
End: 2022-01-31

## 2022-01-31 ENCOUNTER — APPOINTMENT (OUTPATIENT)
Dept: LAB | Facility: MEDICAL CENTER | Age: 27
End: 2022-01-31
Payer: COMMERCIAL

## 2022-01-31 ENCOUNTER — TELEPHONE (OUTPATIENT)
Dept: GASTROENTEROLOGY | Facility: MEDICAL CENTER | Age: 27
End: 2022-01-31

## 2022-01-31 DIAGNOSIS — F41.9 ANXIETY: ICD-10-CM

## 2022-01-31 DIAGNOSIS — D84.9 IMMUNOCOMPROMISED PATIENT (HCC): ICD-10-CM

## 2022-01-31 DIAGNOSIS — T75.3XXA SEVERE MOTION SICKNESS, INITIAL ENCOUNTER: ICD-10-CM

## 2022-01-31 DIAGNOSIS — D64.9 ANEMIA, UNSPECIFIED TYPE: ICD-10-CM

## 2022-01-31 DIAGNOSIS — K50.119 CROHN'S DISEASE OF COLON WITH COMPLICATION (HCC): ICD-10-CM

## 2022-01-31 DIAGNOSIS — E53.8 LOW VITAMIN B12 LEVEL: ICD-10-CM

## 2022-01-31 LAB
ALBUMIN SERPL BCP-MCNC: 3 G/DL (ref 3.5–5)
ALP SERPL-CCNC: 90 U/L (ref 46–116)
ALT SERPL W P-5'-P-CCNC: 18 U/L (ref 12–78)
ANION GAP SERPL CALCULATED.3IONS-SCNC: 6 MMOL/L (ref 4–13)
AST SERPL W P-5'-P-CCNC: 12 U/L (ref 5–45)
BASOPHILS # BLD AUTO: 0.05 THOUSANDS/ΜL (ref 0–0.1)
BASOPHILS NFR BLD AUTO: 1 % (ref 0–1)
BILIRUB SERPL-MCNC: 0.32 MG/DL (ref 0.2–1)
BUN SERPL-MCNC: 12 MG/DL (ref 5–25)
CALCIUM ALBUM COR SERPL-MCNC: 10.7 MG/DL (ref 8.3–10.1)
CALCIUM SERPL-MCNC: 9.9 MG/DL (ref 8.3–10.1)
CHLORIDE SERPL-SCNC: 106 MMOL/L (ref 100–108)
CO2 SERPL-SCNC: 24 MMOL/L (ref 21–32)
CREAT SERPL-MCNC: 0.82 MG/DL (ref 0.6–1.3)
CRP SERPL QL: 40.1 MG/L
EOSINOPHIL # BLD AUTO: 0.28 THOUSAND/ΜL (ref 0–0.61)
EOSINOPHIL NFR BLD AUTO: 3 % (ref 0–6)
ERYTHROCYTE [DISTWIDTH] IN BLOOD BY AUTOMATED COUNT: 13.8 % (ref 11.6–15.1)
FERRITIN SERPL-MCNC: 8 NG/ML (ref 8–388)
GFR SERPL CREATININE-BSD FRML MDRD: 99 ML/MIN/1.73SQ M
GLUCOSE P FAST SERPL-MCNC: 80 MG/DL (ref 65–99)
HBV CORE AB SER QL: NORMAL
HBV CORE IGM SER QL: NORMAL
HBV SURFACE AG SER QL: NORMAL
HCT VFR BLD AUTO: 35.4 % (ref 34.8–46.1)
HCV AB SER QL: NORMAL
HGB BLD-MCNC: 11.3 G/DL (ref 11.5–15.4)
IMM GRANULOCYTES # BLD AUTO: 0.02 THOUSAND/UL (ref 0–0.2)
IMM GRANULOCYTES NFR BLD AUTO: 0 % (ref 0–2)
IRON SERPL-MCNC: 52 UG/DL (ref 50–170)
LYMPHOCYTES # BLD AUTO: 2.42 THOUSANDS/ΜL (ref 0.6–4.47)
LYMPHOCYTES NFR BLD AUTO: 28 % (ref 14–44)
MCH RBC QN AUTO: 26.5 PG (ref 26.8–34.3)
MCHC RBC AUTO-ENTMCNC: 31.9 G/DL (ref 31.4–37.4)
MCV RBC AUTO: 83 FL (ref 82–98)
MONOCYTES # BLD AUTO: 0.52 THOUSAND/ΜL (ref 0.17–1.22)
MONOCYTES NFR BLD AUTO: 6 % (ref 4–12)
NEUTROPHILS # BLD AUTO: 5.23 THOUSANDS/ΜL (ref 1.85–7.62)
NEUTS SEG NFR BLD AUTO: 62 % (ref 43–75)
NRBC BLD AUTO-RTO: 0 /100 WBCS
PLATELET # BLD AUTO: 318 THOUSANDS/UL (ref 149–390)
PMV BLD AUTO: 9.8 FL (ref 8.9–12.7)
POTASSIUM SERPL-SCNC: 4.4 MMOL/L (ref 3.5–5.3)
PROT SERPL-MCNC: 7.7 G/DL (ref 6.4–8.2)
RBC # BLD AUTO: 4.26 MILLION/UL (ref 3.81–5.12)
SODIUM SERPL-SCNC: 136 MMOL/L (ref 136–145)
TRANSFERRIN SERPL-MCNC: 349 MG/DL (ref 200–400)
VIT B12 SERPL-MCNC: 380 PG/ML (ref 100–900)
WBC # BLD AUTO: 8.52 THOUSAND/UL (ref 4.31–10.16)

## 2022-01-31 PROCEDURE — 86803 HEPATITIS C AB TEST: CPT

## 2022-01-31 PROCEDURE — 84466 ASSAY OF TRANSFERRIN: CPT

## 2022-01-31 PROCEDURE — 80230 DRUG ASSAY INFLIXIMAB: CPT

## 2022-01-31 PROCEDURE — 82397 CHEMILUMINESCENT ASSAY: CPT

## 2022-01-31 PROCEDURE — 80053 COMPREHEN METABOLIC PANEL: CPT

## 2022-01-31 PROCEDURE — 86480 TB TEST CELL IMMUN MEASURE: CPT

## 2022-01-31 PROCEDURE — 82728 ASSAY OF FERRITIN: CPT

## 2022-01-31 PROCEDURE — 82607 VITAMIN B-12: CPT

## 2022-01-31 PROCEDURE — 86140 C-REACTIVE PROTEIN: CPT

## 2022-01-31 PROCEDURE — 36415 COLL VENOUS BLD VENIPUNCTURE: CPT

## 2022-01-31 PROCEDURE — 86704 HEP B CORE ANTIBODY TOTAL: CPT

## 2022-01-31 PROCEDURE — 86705 HEP B CORE ANTIBODY IGM: CPT

## 2022-01-31 PROCEDURE — 85025 COMPLETE CBC W/AUTO DIFF WBC: CPT

## 2022-01-31 PROCEDURE — 87340 HEPATITIS B SURFACE AG IA: CPT

## 2022-01-31 PROCEDURE — 83540 ASSAY OF IRON: CPT

## 2022-01-31 RX ORDER — ESCITALOPRAM OXALATE 20 MG/1
20 TABLET ORAL DAILY
Qty: 90 TABLET | Refills: 0 | Status: SHIPPED | OUTPATIENT
Start: 2022-01-31 | End: 2022-05-19 | Stop reason: SDUPTHER

## 2022-01-31 RX ORDER — ONDANSETRON 4 MG/1
4 TABLET, ORALLY DISINTEGRATING ORAL EVERY 6 HOURS PRN
Qty: 20 TABLET | Refills: 0 | Status: SHIPPED | OUTPATIENT
Start: 2022-01-31 | End: 2022-06-28 | Stop reason: SDUPTHER

## 2022-01-31 NOTE — TELEPHONE ENCOUNTER
Message recd to call patient to schedule an appt   Lvm for patient to give us a call to schedule an appt     Referral in chart

## 2022-01-31 NOTE — TELEPHONE ENCOUNTER
Sent inSeabagset message to clerical team at Dermatology asking them to reach out to patient to schedule from referral, also called patient and left voicemail with dermatology's number to call them directly if necessary, also mailed patient another copy of referral with number listed

## 2022-01-31 NOTE — TELEPHONE ENCOUNTER
----- Message from Estela Moreno MA sent at 1/31/2022  1:49 PM EST -----  Regarding: FW: Dermatology referral     ----- Message -----  From: Claribel Keene  Sent: 1/31/2022  11:02 AM EST  To: [facility] [place]  Subject: Dermatology referral                             Hi Dr Em Colon,     I have noticed the spots of dry/irritated skin in more spots, and the small pimple-like reaction is still present  I havent received a call for dermatology yet so I am wondering what your suggestion may be       Thanks for everything,   Yaz

## 2022-02-01 DIAGNOSIS — K50.119 CROHN'S DISEASE OF COLON WITH COMPLICATION (HCC): ICD-10-CM

## 2022-02-01 LAB
GAMMA INTERFERON BACKGROUND BLD IA-ACNC: 0.04 IU/ML
M TB IFN-G BLD-IMP: NEGATIVE
M TB IFN-G CD4+ BCKGRND COR BLD-ACNC: 0.01 IU/ML
M TB IFN-G CD4+ BCKGRND COR BLD-ACNC: 0.01 IU/ML
MITOGEN IGNF BCKGRD COR BLD-ACNC: 4.16 IU/ML

## 2022-02-01 RX ORDER — FERROUS SULFATE TAB EC 324 MG (65 MG FE EQUIVALENT) 324 (65 FE) MG
324 TABLET DELAYED RESPONSE ORAL
Qty: 60 TABLET | Refills: 3 | Status: SHIPPED | OUTPATIENT
Start: 2022-02-01

## 2022-02-08 ENCOUNTER — TELEPHONE (OUTPATIENT)
Dept: GASTROENTEROLOGY | Facility: CLINIC | Age: 27
End: 2022-02-08

## 2022-02-08 DIAGNOSIS — K50.119 CROHN'S DISEASE OF COLON WITH COMPLICATION (HCC): ICD-10-CM

## 2022-02-08 LAB
INFLIXIMAB AB SERPL-MCNC: 70 NG/ML
INFLIXIMAB SERPL-MCNC: 0.4 UG/ML

## 2022-02-08 NOTE — TELEPHONE ENCOUNTER
Airam Sanchez MD routed conversation to You; Killian Castorena 22 minutes ago (8:39 AM)     Airam Sanchez MD 22 minutes ago (8:39 AM)     YS       Hi,     Her remicade level was undetectable with some low level antibodies  Can we get her approved for every 4 week dosing ASAP?     Thank you!          Documentation

## 2022-02-08 NOTE — TELEPHONE ENCOUNTER
Hi,    Her remicade level was undetectable with some low level antibodies  Can we get her approved for every 4 week dosing ASAP? Thank you!

## 2022-02-08 NOTE — TELEPHONE ENCOUNTER
Initiated on [facility] with keycode: [id number]    Clinicals need to be uploaded before submitting to plan

## 2022-02-10 NOTE — TELEPHONE ENCOUNTER
Per [facility] notes, authorization was through [facility]  Called them at [phone] and spoke with Imani  She will be faxing me a form to be completed for frequency increase request

## 2022-02-10 NOTE — TELEPHONE ENCOUNTER
Form was completed and faxed back over for review       Clinical notes and lab results attached to request

## 2022-02-11 ENCOUNTER — OFFICE VISIT (OUTPATIENT)
Dept: FAMILY MEDICINE CLINIC | Facility: CLINIC | Age: 27
End: 2022-02-11
Payer: COMMERCIAL

## 2022-02-11 VITALS
SYSTOLIC BLOOD PRESSURE: 108 MMHG | HEIGHT: 66 IN | WEIGHT: 198 LBS | HEART RATE: 76 BPM | BODY MASS INDEX: 31.82 KG/M2 | DIASTOLIC BLOOD PRESSURE: 60 MMHG | TEMPERATURE: 97.3 F

## 2022-02-11 DIAGNOSIS — S20.212A CONTUSION OF LEFT CHEST WALL, INITIAL ENCOUNTER: ICD-10-CM
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
## 2022-02-11 DIAGNOSIS — S46.812A TRAPEZIUS MUSCLE STRAIN, LEFT, INITIAL ENCOUNTER: Primary | ICD-10-CM

## 2022-02-11 DIAGNOSIS — M54.6 THORACIC SPINE PAIN: ICD-10-CM

## 2022-02-11 PROCEDURE — 99213 OFFICE O/P EST LOW 20 MIN: CPT | Performed by: FAMILY MEDICINE

## 2022-02-11 PROCEDURE — 3008F BODY MASS INDEX DOCD: CPT | Performed by: INTERNAL MEDICINE
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
## 2022-02-14 ENCOUNTER — PATIENT MESSAGE (OUTPATIENT)
Dept: FAMILY MEDICINE CLINIC | Facility: CLINIC | Age: 27
End: 2022-02-14

## 2022-02-14 ENCOUNTER — TELEPHONE (OUTPATIENT)
Dept: FAMILY MEDICINE CLINIC | Facility: CLINIC | Age: 27
End: 2022-02-14
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
## 2022-02-14 NOTE — TELEPHONE ENCOUNTER
Received approval via fax  Valid 11 22 21 to 11 21 22   [facility]#:[id number]    Called insurance and spoke with González Barr to confirm it is approved for a max of 500mg every 4 weeks

## 2022-02-15 RX ORDER — INFLIXIMAB 100 MG/10ML
5 INJECTION, POWDER, LYOPHILIZED, FOR SOLUTION INTRAVENOUS
Qty: 5 EACH | Refills: 6 | Status: SHIPPED | OUTPATIENT
Start: 2022-02-15

## 2022-02-15 NOTE — TELEPHONE ENCOUNTER
[facility] needs a new order for increase dosing  Rosa Elena/Melanie - can one of you have the provider complete and send this over to [facility]? Thanks!

## 2022-03-04 ENCOUNTER — TRANSCRIBE ORDERS (OUTPATIENT)
Dept: GASTROENTEROLOGY | Facility: MEDICAL CENTER | Age: 27
End: 2022-03-04

## 2022-03-13 ENCOUNTER — OFFICE VISIT (OUTPATIENT)
Dept: URGENT CARE | Facility: MEDICAL CENTER | Age: 27
End: 2022-03-13
Payer: COMMERCIAL

## 2022-03-13 VITALS
HEART RATE: 103 BPM | BODY MASS INDEX: 31.95 KG/M2 | OXYGEN SATURATION: 100 % | TEMPERATURE: 97.9 F | WEIGHT: 197.97 LBS | RESPIRATION RATE: 18 BRPM

## 2022-03-13 DIAGNOSIS — J02.9 SORE THROAT: Primary | ICD-10-CM

## 2022-03-13 LAB — S PYO AG THROAT QL: NEGATIVE

## 2022-03-13 PROCEDURE — 99213 OFFICE O/P EST LOW 20 MIN: CPT | Performed by: PHYSICIAN ASSISTANT

## 2022-03-13 PROCEDURE — 87880 STREP A ASSAY W/OPTIC: CPT | Performed by: PHYSICIAN ASSISTANT

## 2022-03-13 PROCEDURE — 87070 CULTURE OTHR SPECIMN AEROBIC: CPT | Performed by: PHYSICIAN ASSISTANT

## 2022-03-13 NOTE — PROGRESS NOTES
[facility]        NAME: Lore Cohen is a 23 y o  female  : 1995    MRN: [id number]  DATE: 2022  TIME: 11:25 AM    Assessment and Plan   Sore throat [J02 9]  1  Sore throat  POCT rapid strepA    Throat culture         Patient Instructions     1  Motrin as needed for throat pain  2  Increase fluids  3  Follow up with PCP in 3-5 days if symptoms persist       Chief Complaint     Chief Complaint   Patient presents with    Sore Throat     sore throat; no other symptoms          History of Present Illness       Nuria Lama is a 55-year-old female presents with a 1 day history of acute onset sore throat  Patient denies any fever, chills, body aches, nasal discharge or cough since the onset of her illness  Sore Throat   This is a new problem  The current episode started in the past 7 days  The problem has been rapidly worsening  Neither side of throat is experiencing more pain than the other  There has been no fever  The fever has been present for less than 1 day  The pain is at a severity of 9/10  Associated symptoms include congestion, ear pain, swollen glands and trouble swallowing  Pertinent negatives include no abdominal pain, coughing, diarrhea, drooling, ear discharge, headaches, hoarse voice, plugged ear sensation, neck pain, shortness of breath, stridor or vomiting  She has had no exposure to strep or mono  Review of Systems   Review of Systems   HENT: Positive for congestion, ear pain, sore throat and trouble swallowing  Negative for drooling, ear discharge and hoarse voice  Respiratory: Negative for cough, shortness of breath and stridor  Gastrointestinal: Negative for abdominal pain, diarrhea and vomiting  Musculoskeletal: Negative for neck pain  Neurological: Negative for headaches           Current Medications       Current Outpatient Medications:     drospirenone-ethinyl estradiol (OTTONIEL) 3-0 02 MG per tablet, Take 1 tablet by mouth daily, Disp: 90 tablet, Rfl: 4    escitalopram (LEXAPRO) 20 mg tablet, Take 1 tablet (20 mg total) by mouth daily, Disp: 90 tablet, Rfl: 0    ferrous sulfate 324 (65 Fe) mg, Take 1 tablet (324 mg total) by mouth 2 (two) times a day before meals, Disp: 60 tablet, Rfl: 3    inFLIXimab (Remicade) 100 mg, Infuse 449 mg into a venous catheter every 28 days, Disp: 5 each, Rfl: 6    ondansetron (ZOFRAN-ODT) 4 mg disintegrating tablet, Take 1 tablet (4 mg total) by mouth every 6 (six) hours as needed for nausea or vomiting, Disp: 20 tablet, Rfl: 0    scopolamine (TRANSDERM-SCOP) 1 mg/3 days TD 72 hr patch, Place 1 patch on the skin every third day, Disp: 10 patch, Rfl: 0    Current Allergies     Allergies as of 03/13/2022 - Reviewed 03/13/2022   Allergen Reaction Noted    Penicillins Rash 03/26/2019            The following portions of the patient's history were reviewed and updated as appropriate: allergies, current medications, past family history, past medical history, past social history, past surgical history and problem list      Past Medical History:   Diagnosis Date    [facility] colitis ([facility] )     Crohn's disease ([facility] )        History reviewed  No pertinent surgical history  Family History   Problem Relation Age of Onset    Breast cancer Maternal Grandmother          Medications have been verified  Objective   Pulse 103   Temp 97 9 °F (36 6 °C)   Resp 18   Wt 89 8 kg (197 lb 15 6 oz)   SpO2 100%   BMI 31 95 kg/m²   No LMP recorded  Physical Exam     Physical Exam  Constitutional:       General: She is not in acute distress  Appearance: She is well-developed  HENT:      Head: Normocephalic and atraumatic  Right Ear: Tympanic membrane and ear canal normal       Left Ear: Tympanic membrane and ear canal normal       Nose: Nose normal       Mouth/Throat:      Pharynx: Posterior oropharyngeal erythema present  No oropharyngeal exudate  Cardiovascular:      Rate and Rhythm: Normal rate and regular rhythm  Heart sounds: Normal heart sounds  No murmur heard  Pulmonary:      Effort: Pulmonary effort is normal       Breath sounds: Normal breath sounds  Neurological:      Mental Status: She is alert

## 2022-03-16 LAB — BACTERIA THROAT CULT: NORMAL

## 2022-03-29 ENCOUNTER — TELEPHONE (OUTPATIENT)
Dept: GASTROENTEROLOGY | Facility: MEDICAL CENTER | Age: 27
End: 2022-03-29

## 2022-03-29 NOTE — TELEPHONE ENCOUNTER
----- Message from Katrin Sellers, [address] sent at 3/29/2022  7:26 AM EDT -----  Regarding: FW: Colonoscopy Prep    ----- Message -----  From: Radha Diaz  Sent: 3/29/2022   7:06 AM EDT  To: [facility]  Subject: Colonoscopy Prep                                 Minnie Ramirez,     I was wondering if you could send me the colonoscopy prep for my upcoming procedure this Friday? I cant seem to find what I need to purchase for it       Thank you,   Simone Kawasaki

## 2022-03-29 NOTE — TELEPHONE ENCOUNTER
Hi,    Can someone contact the patient and go over the preparation for her upcoming colonoscopy (and send her the instruction sheet)? We will do a Miralax and magnesium citrate prep    Thank you!

## 2022-04-01 ENCOUNTER — ANESTHESIA (OUTPATIENT)
Dept: GASTROENTEROLOGY | Facility: MEDICAL CENTER | Age: 27
End: 2022-04-01

## 2022-04-01 ENCOUNTER — HOSPITAL ENCOUNTER (OUTPATIENT)
Dept: GASTROENTEROLOGY | Facility: MEDICAL CENTER | Age: 27
Setting detail: OUTPATIENT SURGERY
Discharge: HOME | End: 2022-04-01
Attending: INTERNAL MEDICINE | Admitting: STUDENT IN AN ORGANIZED HEALTH CARE EDUCATION/TRAINING PROGRAM
Payer: COMMERCIAL

## 2022-04-01 ENCOUNTER — ANESTHESIA EVENT (OUTPATIENT)
Dept: GASTROENTEROLOGY | Facility: MEDICAL CENTER | Age: 27
End: 2022-04-01

## 2022-04-01 VITALS
DIASTOLIC BLOOD PRESSURE: 82 MMHG | BODY MASS INDEX: 31.82 KG/M2 | HEART RATE: 84 BPM | SYSTOLIC BLOOD PRESSURE: 148 MMHG | OXYGEN SATURATION: 99 % | RESPIRATION RATE: 24 BRPM | TEMPERATURE: 98.6 F | WEIGHT: 198 LBS | HEIGHT: 66 IN

## 2022-04-01 DIAGNOSIS — K50.119 CROHN'S DISEASE OF COLON WITH COMPLICATION (HCC): ICD-10-CM

## 2022-04-01 DIAGNOSIS — R19.7 DIARRHEA, UNSPECIFIED TYPE: ICD-10-CM

## 2022-04-01 DIAGNOSIS — D64.9 ANEMIA, UNSPECIFIED TYPE: ICD-10-CM

## 2022-04-01 LAB
EXT PREGNANCY TEST URINE: NEGATIVE
EXT. CONTROL: NORMAL

## 2022-04-01 PROCEDURE — 45380 COLONOSCOPY AND BIOPSY: CPT | Performed by: STUDENT IN AN ORGANIZED HEALTH CARE EDUCATION/TRAINING PROGRAM

## 2022-04-01 PROCEDURE — 43239 EGD BIOPSY SINGLE/MULTIPLE: CPT | Performed by: STUDENT IN AN ORGANIZED HEALTH CARE EDUCATION/TRAINING PROGRAM

## 2022-04-01 PROCEDURE — 88305 TISSUE EXAM BY PATHOLOGIST: CPT | Performed by: PATHOLOGY

## 2022-04-01 PROCEDURE — 81025 URINE PREGNANCY TEST: CPT | Performed by: ANESTHESIOLOGY

## 2022-04-01 PROCEDURE — 45385 COLONOSCOPY W/LESION REMOVAL: CPT | Performed by: STUDENT IN AN ORGANIZED HEALTH CARE EDUCATION/TRAINING PROGRAM

## 2022-04-01 RX ORDER — LIDOCAINE HYDROCHLORIDE 20 MG/ML
INJECTION, SOLUTION EPIDURAL; INFILTRATION; INTRACAUDAL; PERINEURAL AS NEEDED
Status: DISCONTINUED | OUTPATIENT
Start: 2022-04-01 | End: 2022-04-01

## 2022-04-01 RX ORDER — PROPOFOL 10 MG/ML
INJECTION, EMULSION INTRAVENOUS AS NEEDED
Status: DISCONTINUED | OUTPATIENT
Start: 2022-04-01 | End: 2022-04-01

## 2022-04-01 RX ORDER — SODIUM CHLORIDE 9 MG/ML
125 INJECTION, SOLUTION INTRAVENOUS CONTINUOUS
Status: DISCONTINUED | OUTPATIENT
Start: 2022-04-01 | End: 2022-04-05 | Stop reason: HOSPADM

## 2022-04-01 RX ORDER — PROPOFOL 10 MG/ML
INJECTION, EMULSION INTRAVENOUS CONTINUOUS PRN
Status: DISCONTINUED | OUTPATIENT
Start: 2022-04-01 | End: 2022-04-01

## 2022-04-01 RX ADMIN — PROPOFOL 160 MCG/KG/MIN: 10 INJECTION, EMULSION INTRAVENOUS at 14:08

## 2022-04-01 RX ADMIN — PROPOFOL 50 MG: 10 INJECTION, EMULSION INTRAVENOUS at 14:12

## 2022-04-01 RX ADMIN — PROPOFOL 50 MG: 10 INJECTION, EMULSION INTRAVENOUS at 14:07

## 2022-04-01 RX ADMIN — SODIUM CHLORIDE 125 ML/HR: 0.9 INJECTION, SOLUTION INTRAVENOUS at 13:42

## 2022-04-01 RX ADMIN — LIDOCAINE HYDROCHLORIDE 100 MG: 20 INJECTION, SOLUTION EPIDURAL; INFILTRATION; INTRACAUDAL; PERINEURAL at 14:05

## 2022-04-01 RX ADMIN — PROPOFOL 50 MG: 10 INJECTION, EMULSION INTRAVENOUS at 14:45

## 2022-04-01 RX ADMIN — PROPOFOL 150 MG: 10 INJECTION, EMULSION INTRAVENOUS at 14:05

## 2022-04-01 RX ADMIN — PROPOFOL 50 MG: 10 INJECTION, EMULSION INTRAVENOUS at 14:25

## 2022-04-01 NOTE — ANESTHESIA PREPROCEDURE EVALUATION
Procedure:  COLONOSCOPY  EGD    Relevant Problems   HEMATOLOGY   (+) Absolute anemia   (+) Immunocompromised patient ([address] )      NEURO/PSYCH   (+) Anxiety      Other   (+) Crohn's disease of colon with complication (HCC)   (+) Functional diarrhea        Physical Exam    Airway    Mallampati score: I  TM Distance: >3 FB  Neck ROM: full     Dental   No notable dental hx     Cardiovascular  Rhythm: regular, Rate: normal, Cardiovascular exam normal    Pulmonary  Pulmonary exam normal Breath sounds clear to auscultation,     Other Findings        Anesthesia Plan  ASA Score- 2     Anesthesia Type- IV sedation with anesthesia with ASA Monitors  Additional Monitors:   Airway Plan:           Plan Factors-Exercise tolerance (METS): >4 METS  Chart reviewed  Existing labs reviewed  Patient summary reviewed  Patient is not a current smoker  Patient did not smoke on day of surgery  Induction- intravenous  Postoperative Plan-     Informed Consent- Anesthetic plan and risks discussed with patient

## 2022-04-01 NOTE — DISCHARGE INSTRUCTIONS
Upper Endoscopy and Colonoscopy   WHAT YOU NEED TO KNOW:   An upper endoscopy is also called an upper gastrointestinal (GI) endoscopy, or an esophagogastroduodenoscopy (EGD)  It is a procedure to examine the inside of your esophagus, stomach, and duodenum (first part of the small intestine) with a scope  You may feel bloated, gassy, or have some abdominal discomfort after your procedure  Your throat may be sore for 24 to 36 hours  You may burp or pass gas from air that is still inside your body  A colonoscopy is a procedure to examine the inside of your colon (intestine) with a scope  Polyps or tissue growths may have been removed during your colonoscopy  It is normal to feel bloated and to have some abdominal discomfort  You should be passing gas  If you have hemorrhoids or you had polyps removed, you may have a small amount of bleeding  DISCHARGE INSTRUCTIONS:   Seek care immediately if:   · You have sudden, severe abdominal pain  · You have problems swallowing  · You have a large amount of black, sticky bowel movements or blood in your bowel movements  · You have sudden trouble breathing  · You feel weak, lightheaded, or faint or your heart beats faster than normal for you  Contact your healthcare provider if:   · You have a fever and chills  · You have nausea or are vomiting  · Your abdomen is bloated or feels full and hard  · You have abdominal pain  · You have a large amount of black, sticky bowel movements or blood in your bowel movements  · You have not had a bowel movement for 3 days after your procedure  · You have rash or hives  · You have questions or concerns about your procedure  Activity:   ·       Do not lift, strain, or run for 24 hours after your procedure  ·       Rest after your procedure  You have been given medicine to relax you  Do not drive or make important decisions until the day after your procedure   Return to your normal activity as directed  ·       Relieve gas and discomfort from bloating by lying on your right side with a heating pad on your abdomen  You may need to take short walks to help the gas move out  Eat small meals until bloating is relieved  Follow up with your healthcare provider as directed: Write down your questions so you remember to ask them during your visits  If you take a blood thinner, please review the specific instructions from your endoscopist about when you should resume it  These can be found in the Recommendation and Your Medication list sections of this After Visit Summary  Colorectal Polyps   WHAT YOU NEED TO KNOW:   Colorectal polyps are small growths of tissue in the lining of the colon and rectum  Most polyps are hyperplastic polyps and are usually benign (noncancerous)  Certain types of polyps, called adenomatous polyps, may turn into cancer  DISCHARGE INSTRUCTIONS:   Follow up with your healthcare provider or gastroenterologist as directed: You may need to return for more tests, such as another colonoscopy  Write down your questions so you remember to ask them during your visits  Reduce your risk for colorectal polyps:   · Eat a variety of healthy foods:  Healthy foods include fruit, vegetables, whole-grain breads, low-fat dairy products, beans, lean meat, and fish  Ask if you need to be on a special diet  · Maintain a healthy weight:  Ask your healthcare provider if you need to lose weight and how much you need to lose  Ask for help with a weight loss program     · Exercise:  Begin to exercise slowly and do more as you get stronger  Talk with your healthcare provider before you start an exercise program      · Limit alcohol:  Your risk for polyps increases the more you drink  · Do not smoke: If you smoke, it is never too late to quit  Ask for information about how to stop      For support and more information:   · [address] [employer] ([employer]  [address] [place], [place] [address]  Phone: 6- 302 - [phone]  Web Address: www digestive  niddk nih gov    Contact your healthcare provider or gastroenterologist if:   · You have a fever  · You have chills, a cough, or feel weak and achy  · You have abdominal pain that does not go away or gets worse after you take medicine  · Your abdomen is swollen  · You are losing weight without trying  · You have questions or concerns about your condition or care  Seek care immediately or call 911 if:   · You have sudden shortness of breath  · You have a fast heart rate, fast breathing, or are too dizzy to stand up  · You have severe abdominal pain  · You see blood in your bowel movement  © Copyright [address] Information is for End User's use only and may not be sold, redistributed or otherwise used for commercial purposes  All illustrations and images included in CareNotes® are the copyrighted property of A D A M , Inc  or [address] [place], [place] [address]jocelyn   The above information is an  only  It is not intended as medical advice for individual conditions or treatments  Talk to your doctor, nurse or pharmacist before following any medical regimen to see if it is safe and effective for you

## 2022-04-01 NOTE — H&P
History and Physical - [facility]  Carlton Ho 23 y o  female MRN: [id number]                  HPI: Carlton Ho is a 23y o  year old female who presents for Crohn's disease      REVIEW OF SYSTEMS: Per the HPI, and otherwise unremarkable  Historical Information   Past Medical History:   Diagnosis Date    Crohn's colitis ([facility] )     Crohn's disease ([facility] )      Past Surgical History:   Procedure Laterality Date    COLONOSCOPY       Social History   Social History     Substance and Sexual Activity   Alcohol Use Yes    Comment: social     Social History     Substance and Sexual Activity   Drug Use Never     Social History     Tobacco Use   Smoking Status Never Smoker   Smokeless Tobacco Never Used     Family History   Problem Relation Age of Onset    Breast cancer Maternal Grandmother        Meds/Allergies       Current Outpatient Medications:     drospirenone-ethinyl estradiol (OTTONIEL) 3-0 02 MG per tablet    escitalopram (LEXAPRO) 20 mg tablet    ferrous sulfate 324 (65 Fe) mg    inFLIXimab (Remicade) 100 mg    ondansetron (ZOFRAN-ODT) 4 mg disintegrating tablet    scopolamine (TRANSDERM-SCOP) 1 mg/3 days TD 72 hr patch    Current Facility-Administered Medications:     sodium chloride 0 9 % infusion, 125 mL/hr, Intravenous, Continuous, 125 mL/hr at 04/01/22 1342    Allergies   Allergen Reactions    Penicillins Rash       Objective     /89   Pulse 79   Temp 98 6 °F (37 °C) (Temporal)   Resp 16   Ht 5' 6" (1 676 m)   Wt 89 8 kg (198 lb)   SpO2 98%   BMI 31 96 kg/m²       PHYSICAL EXAM    Gen: NAD  Head: NCAT  CV: RRR  CHEST: Clear  ABD: soft, NT/ND  EXT: no edema      ASSESSMENT/PLAN:  This is a 23y o  year old female here for EGD and colonoscopy, and she is stable and optimized for her procedure

## 2022-05-19 DIAGNOSIS — F41.9 ANXIETY: ICD-10-CM

## 2022-05-19 RX ORDER — ESCITALOPRAM OXALATE 20 MG/1
20 TABLET ORAL DAILY
Qty: 90 TABLET | Refills: 0 | Status: SHIPPED | OUTPATIENT
Start: 2022-05-19

## 2022-05-20 ENCOUNTER — TELEPHONE (OUTPATIENT)
Dept: GASTROENTEROLOGY | Facility: MEDICAL CENTER | Age: 27
End: 2022-05-20

## 2022-05-20 NOTE — LETTER
May 27, 2022    Regarding:  Rachna Chen [address]  [facility] [address]  [id number]      To whom it may concern:    Ms Randal Kilgore is a 33year old female currently under my care for ileocolonic Crohn's disease diagnosed in 2019, previously on Humira but not able to achieve adequate levels or clinical response despite twice weekly dosing  She was switched to Remicade and had been doing well with induction followed by flare of symptoms on maintenance (at which time she had no detectable infliximab and she did have positive anti-drug antibodies)  Her Remicade was increased to every 4 week dosing with improvement of her symptoms and clinical response  It would be detrimental to the patient to stop her infliximab at this time, or switch her to Avsola  If you have any questions or concerns, please don't hesitate to call      Sincerely,           Dr Rodriguez Son

## 2022-05-20 NOTE — TELEPHONE ENCOUNTER
Rec'd an email from nurse at [facility] requesting to change the patient's Remicade to Avsola due to it being preferred by her insurance company  I send over a document for the provider to sign  He signed  I emailed it back to Lauren Espinoza and then it was scanned into the chart

## 2022-05-27 NOTE — TELEPHONE ENCOUNTER
Rec'd communication from [employer] requesting a letter to provide to the insurance company for Infliximab  Letter stated "Since starting Infliximab, has the patient experienced disease stability or improvement in symptoms?" Would you please be able to provide a letter and I could pass it along to her?

## 2022-05-31 NOTE — TELEPHONE ENCOUNTER
Hi,    Lets try a peer to peer  If we care not successful we can then try a different biologic      Thank you

## 2022-05-31 NOTE — PROGRESS NOTES
Assessment and Plan:   Tru Borjas is a 33 y o   female who presents as a Rheumatology consult referred by Maria Alejandra Ruth MD for evaluation of possible Crohn's disease/IBD-related inflammatory arthritis, which is very possible given her joint symptoms  Do labs  Continue Remicade infusions through GI  Start sulfasalazine 1 tab daily for a week, then 1 tab twice a day for a week, then 2 tabs twice a day  Can take celecoxib twice a day as needed for joint pain, take with food    Return to clinic in 4 months    Plan:  Diagnoses and all orders for this visit:    Crohn's disease of colon with complication ([facility] [address] )  -     Ambulatory Referral to Rheumatology  -     HLA-B27 antigen  -     sulfaSALAzine (AZULFIDINE) 500 mg tablet; Take 1 tab daily for a week, then 1 tab twice a day for a week, then 2 tabs twice a day    Inflammatory arthritis    Arthralgia of both knees  -     Ambulatory Referral to Rheumatology  -     HLA-B27 antigen  -     Sedimentation rate, automated  -     C-reactive protein  -     Vitamin D 25 hydroxy  -     sulfaSALAzine (AZULFIDINE) 500 mg tablet; Take 1 tab daily for a week, then 1 tab twice a day for a week, then 2 tabs twice a day  -     celecoxib (CeleBREX) 100 mg capsule; Take 1 capsule (100 mg total) by mouth 2 (two) times a day As needed for joint pain, take with food    Low vitamin D level  -     Vitamin D 25 hydroxy  Follow-up plan: RTC in 4 months        HPI  Tru Borjas is a 33 y o   female who presents as a Rheumatology consult referred by Maria Alejandra Ruth MD for evaluation of possible Crohn's disease/IBD-related inflammatory arthritis  Diagnosed with Crohn's disease in 2019  Has had joint pain that started prior to that; right wrist, knees, and ankles get painful  Joint pain last for a couple of days, gets better on its own  Has been getting less joint pain in right wrist since was started on Remicade   Was first tried on Humira; only worked at the beginning but then lost efficacy  Had side effects on azathioprine  Then started on Remicade in 7/2021 every 8 weeks; two months ago, changed it to every 4 weeks and went up on dose due to starting to develop antibodies  Was on steroids as needed during flare-ups; last time was in 2020  Currently Crohn's is under control; last joint flare-up was a month ago  Review of Systems  Review of Systems   Constitutional: Negative for fatigue, fever and unexpected weight change  HENT: Negative for mouth sores  Respiratory: Negative for cough and shortness of breath  Cardiovascular: Negative for chest pain and leg swelling  Gastrointestinal: Negative for abdominal pain, constipation and diarrhea  Musculoskeletal: Negative for arthralgias, back pain, joint swelling and myalgias  Skin: Negative for color change and rash  Neurological: Negative for weakness  Hematological: Negative for adenopathy  Psychiatric/Behavioral: Negative for sleep disturbance  Reviewed and agree      Allergies  Allergies   Allergen Reactions    Penicillins Rash       Home Medications    Current Outpatient Medications:     celecoxib (CeleBREX) 100 mg capsule, Take 1 capsule (100 mg total) by mouth 2 (two) times a day As needed for joint pain, take with food, Disp: 60 capsule, Rfl: 6    drospirenone-ethinyl estradiol (OTTONIEL) 3-0 02 MG per tablet, Take 1 tablet by mouth daily, Disp: 90 tablet, Rfl: 4    escitalopram (LEXAPRO) 20 mg tablet, Take 1 tablet (20 mg total) by mouth in the morning , Disp: 90 tablet, Rfl: 0    ferrous sulfate 324 (65 Fe) mg, Take 1 tablet (324 mg total) by mouth 2 (two) times a day before meals, Disp: 60 tablet, Rfl: 3    inFLIXimab (Remicade) 100 mg, Infuse 449 mg into a venous catheter every 28 days, Disp: 5 each, Rfl: 6    ondansetron (ZOFRAN-ODT) 4 mg disintegrating tablet, Take 1 tablet (4 mg total) by mouth every 6 (six) hours as needed for nausea or vomiting, Disp: 20 tablet, Rfl: 0    scopolamine (TRANSDERM-SCOP) 1 mg/3 days TD 72 hr patch, Place 1 patch on the skin every third day, Disp: 10 patch, Rfl: 0    sulfaSALAzine (AZULFIDINE) 500 mg tablet, Take 1 tab daily for a week, then 1 tab twice a day for a week, then 2 tabs twice a day, Disp: 120 tablet, Rfl: 6    Past Medical History  Past Medical History:   Diagnosis Date    Crohn's colitis ([facility] )     Crohn's disease ([facility] )        Past Surgical History   Past Surgical History:   Procedure Laterality Date    COLONOSCOPY         Family History    Family History   Problem Relation Age of Onset    Breast cancer Maternal Grandmother        Social History  Occupation:   Social History     Substance and Sexual Activity   Alcohol Use Yes    Comment: social     Social History     Substance and Sexual Activity   Drug Use Never     Social History     Tobacco Use   Smoking Status Never Smoker   Smokeless Tobacco Never Used       Objective:  Vitals:    06/01/22 1007   BP: 130/71   Weight: 94 7 kg (208 lb 12 8 oz)   Height: 5' 6" (1 676 m)       Physical Exam  Constitutional:       General: She is not in acute distress  HENT:      Head: Normocephalic and atraumatic  Eyes:      Conjunctiva/sclera: Conjunctivae normal    Cardiovascular:      Rate and Rhythm: Normal rate and regular rhythm  Heart sounds: S1 normal and S2 normal      No friction rub  Pulmonary:      Effort: Pulmonary effort is normal  No respiratory distress  Breath sounds: Normal breath sounds  No wheezing, rhonchi or rales  Musculoskeletal:      Cervical back: Neck supple  Skin:     General: Skin is warm and dry  Coloration: Skin is not pale  Findings: No rash  Neurological:      Mental Status: She is alert  Mental status is at baseline  Psychiatric:         Mood and Affect: Mood normal          Behavior: Behavior normal        Reviewed labs and imaging      Imaging:   EGD 4/1/22  FINDINGS:  The upper third of the esophagus, middle third of the esophagus and lower third of the esophagus appeared normal  Z-line is 37 cm from the incisors  Performed random biopsy  The stomach appeared normal  Performed random biopsy  The duodenal bulb and 2nd part of the duodenum appeared normal       Echo 10/30/20  Trace regurgitation, otherwise normal    CT abdomen pelvis w contrast 6/12/20  Acute pancolitis findings  No perforation or fluid collection seen  This correlates with history of Crohn's  Labs:   Office Visit on 06/01/2022   Component Date Value Ref Range Status    HLA B27 06/01/2022 Negative   Final    HLA-B*27 Negative  B27 allele interpretation for all loci based on IMGT/HLA  database version 3 44  This test was developed and its performance characteristics  determined by [facility]   It has not been cleared or approved  by the Food and Drug Administration  [facility] CLIA ID Number [id number]  This test was performed using PCR (Polymerase Chain Reaction)/SSOP  (Sequence Specific Oligonucleotide Probes) technique  SBT (Sequence  Based Typing) and/or SSP (Sequence Specific Primers) may be used as  supplemental methods when necessary  Please contact [employer] Customer  Service at [phone] if you have any questions     Director of [facility]   Dr Emiliano Sainz, PhD    Sed Rate 06/01/2022 3  0 - 19 mm/hour Final    CRP 06/01/2022 11 7 (A) <3 0 mg/L Final    Vit D, 25-Hydroxy 06/01/2022 28 0 (A) 30 0 - 100 0 ng/mL Final   Hospital Outpatient Visit on 04/01/2022   Component Date Value Ref Range Status    EXT Preg Test, Ur 04/01/2022 Negative  Negative Final    Control 04/01/2022 Valid  Valid Final    Case Report 04/01/2022    Final                    Value:Surgical Pathology Report                         Case: [id number]                                   Authorizing Provider:  Samantha Rios MD          Collected:           04/01/2022 1407              Ordering Location:     [facility]        Received:            04/01/2022 [id number] [facility] Endoscopy                                                     Pathologist:           Luis Manuel Arizmendi MD                                                         Specimens:   A) - Duodenum, Duodenum bx r/o Crohn's disease                                                      B) - Stomach, Gastric bx r/o H pylori                                                               C) - Esophagus, Esophagus bx r/o IBD                                                                D) - Terminal Ileum, TI bx  r/o Crohn's                                                             E) - Large Intestine, Right/Ascending Colon, Right colon bx r/o IBD                                                           F) - Large Intestine, Transverse Colon, Transverse colon bx r/o Crohn's                             G) - Large Intestine, Left/Descending Colon, Left colon bx r/o Crohn's                              H) - Rectum, Rectal bx r/o Crohn's                                                                  I) - Polyp, Colorectal, Rectal polyp cold snare                                            Final Diagnosis 04/01/2022    Final                    Value: This result contains rich text formatting which cannot be displayed here   Additional Information 04/01/2022    Final                    Value: This result contains rich text formatting which cannot be displayed here  [facility] Description 04/01/2022    Final                    Value: This result contains rich text formatting which cannot be displayed here     Office Visit on 03/13/2022   Component Date Value Ref Range Status     RAPID STREP A 03/13/2022 Negative  Negative Final    Throat Culture 03/13/2022 Negative for beta-hemolytic Streptococcus   Final   Appointment on 01/31/2022   Component Date Value Ref Range Status    WBC 01/31/2022 8 52  4 31 - 10 16 Thousand/uL Final    RBC 01/31/2022 4 26  3 81 - 5 12 Million/uL Final    Hemoglobin 01/31/2022 11 3 (A) 11 5 - 15 4 g/dL Final    Hematocrit 01/31/2022 35 4  34 8 - 46 1 % Final    MCV 01/31/2022 83  82 - 98 fL Final    MCH 01/31/2022 26 5 (A) 26 8 - 34 3 pg Final    MCHC 01/31/2022 31 9  31 4 - 37 4 g/dL Final    RDW 01/31/2022 13 8  11 6 - 15 1 % Final    MPV 01/31/2022 9 8  8 9 - 12 7 fL Final    Platelets 92/02/9309 318  149 - 390 Thousands/uL Final    nRBC 01/31/2022 0  /100 WBCs Final    Neutrophils Relative 01/31/2022 62  43 - 75 % Final    Immat GRANS % 01/31/2022 0  0 - 2 % Final    Lymphocytes Relative 01/31/2022 28  14 - 44 % Final    Monocytes Relative 01/31/2022 6  4 - 12 % Final    Eosinophils Relative 01/31/2022 3  0 - 6 % Final    Basophils Relative 01/31/2022 1  0 - 1 % Final    Neutrophils Absolute 01/31/2022 5 23  1 85 - 7 62 Thousands/µL Final    Immature Grans Absolute 01/31/2022 0 02  0 00 - 0 20 Thousand/uL Final    Lymphocytes Absolute 01/31/2022 2 42  0 60 - 4 47 Thousands/µL Final    Monocytes Absolute 01/31/2022 0 52  0 17 - 1 22 Thousand/µL Final    Eosinophils Absolute 01/31/2022 0 28  0 00 - 0 61 Thousand/µL Final    Basophils Absolute 01/31/2022 0 05  0 00 - 0 10 Thousands/µL Final    Sodium 01/31/2022 136  136 - 145 mmol/L Final    Potassium 01/31/2022 4 4  3 5 - 5 3 mmol/L Final    Chloride 01/31/2022 106  100 - 108 mmol/L Final    CO2 01/31/2022 24  21 - 32 mmol/L Final    ANION GAP 01/31/2022 6  4 - 13 mmol/L Final    BUN 01/31/2022 12  5 - 25 mg/dL Final    Creatinine 01/31/2022 0 82  0 60 - 1 30 mg/dL Final    Standardized to IDMS reference method    Glucose, Fasting 01/31/2022 80  65 - 99 mg/dL Final    Specimen collection should occur prior to Sulfasalazine administration due to the potential for falsely depressed results  Specimen collection should occur prior to Sulfapyridine administration due to the potential for falsely elevated results      Calcium 01/31/2022 9 9  8 3 - 10 1 mg/dL Final    Corrected Calcium 01/31/2022 10 7 (A) 8 3 - 10 1 mg/dL Final    AST 01/31/2022 12  5 - 45 U/L Final    Specimen collection should occur prior to Sulfasalazine administration due to the potential for falsely depressed results   ALT 01/31/2022 18  12 - 78 U/L Final    Specimen collection should occur prior to Sulfasalazine and/or Sulfapyridine administration due to the potential for falsely depressed results   Alkaline Phosphatase 01/31/2022 90  46 - 116 U/L Final    Total Protein 01/31/2022 7 7  6 4 - 8 2 g/dL Final    Albumin 01/31/2022 3 0 (A) 3 5 - 5 0 g/dL Final    Total Bilirubin 01/31/2022 0 32  0 20 - 1 00 mg/dL Final    Use of this assay is not recommended for patients undergoing treatment with eltrombopag due to the potential for falsely elevated results   eGFR 01/31/2022 99  ml/min/1 73sq m Final    CRP 01/31/2022 40 1 (A) <3 0 mg/L Final    Ferritin 01/31/2022 8  8 - 388 ng/mL Final    Iron 01/31/2022 52  50 - 170 ug/dL Final    Patients treated with metal-binding drugs (ie  Deferoxamine) may have depressed iron values   Transferrin 01/31/2022 349  200 - 400 mg/dL Final    Vitamin B-12 01/31/2022 380  100 - 900 pg/mL Final    INFLIXIMAB DRUG LEVEL (AKA INFLIXI* 01/31/2022 0 4  ug/mL Final      Quantitation Limit: <0 4 ug/mL  Results of 0 4 or higher indicate detection of infliximab  Comments:       - The optimal drug concentration depends upon         patient-specific factors including the disease and         desired therapeutic endpoint        - Maintenance trough concentrations >=5 may be         associated with higher remission rates  (1)       - In severe CD, higher trough levels (>10) may         be necessary to achieve fistula healing (2)       - In rheumatoid arthritis, EULAR responders had higher         median trough levels (3 6) than non-responders         (0 5) (3)       - This assay measures the antibody-unbound (free)         fraction of infliximab when serum anti-infliximab         antibodies are present        ANTI-INFLIXIMAB ANTIBODY 01/31/2022 70  ng/mL Final    Comment:       Interpretation:      The above result is a LOW Antibody titer  Quantitation Limit: <22 ng/mL  Results of 22 or higher indicate detection of anti-      infliximab antibodies  22 - 200 ng/mL: LOW titer      201 - 1,000 ng/mL: INTERMEDIATE titer      1,001 or greater ng/mL: HIGH titer    Comments:       - Anti-drug antibody levels should be interpreted in         the context of the concomitant free drug trough         concentration        - Low titer anti-drug antibodies may be transient         while high titers are likely to be more         consequential (4-6)       - Some immunogenicity is reversible  Elimination of         intermediate titer (and even some high titer)         anti-infliximab antibodies has been achieved with         dose escalation and/or methotrexate or 6-MP  (7)       - Maintenance of drug levels greater than 3 ug/mL may         reduce the risk of developing anti-drug         antibodies  (8)       - This anti-infliximab antibody assay is drug                            tolerant,         and all positive results are verified for anti-drug         specificity by a confirmatory test     References:  1  Carmen FERGUSON, et al  [facility] Review on TDM in IBD  Gastroenterol 5177;[phone]   2  Tyrone Zhu et al  [place] 2016;150(4):W789-S528   3  [address] Rheum Dis 6706;64:[phone]  4  Pau Hua et al  Inflamm Bowel Dis 7206;59(87):     5153-2951   5  Ahmet Serrano et al  Am J Gastroenterol 7538;485:     [phone]   6  Elisa Quarles et al  Clin Gastroenterol Hepatol 8456;44(9):     444-241   7  Krys ALVA et al  [place] 2019;156(6):S-617   8  Tara Karimi et al  Gastroenterol 2016;150(4):S144   29 [facility], et [address] [place] [address] Journal 0933 [id number]/     [id number]      These tests were developed and their performance  characteristics determined by [facility]   They have not been  cleared or approved by the Food and Drug Administration  However, these electrochemiluminescence immunoassay (ECLIA)  measurements of infliximab and                            anti-infliximab antibody  (constituting DoseASSURE IFX) have been developed and  validated in accordance with CLIA (Clinical Laboratory  Improvement Amendments) and the FDA Guidance document, Assay  Development and Validation for Immunogenicity Testing of  Therapeutic Protein Products (2019)  Results of a  peer-reviewed methods comparison / validation of these  assays have been published  (9)      QFT Nil 01/31/2022 0 04  0 - 8 0 IU/ml Final    QFT TB1-NIL 01/31/2022 0 01  IU/ml Final    QFT TB2-NIL 01/31/2022 0 01  IU/ml Final    QFT Mitogen-NIL 01/31/2022 4 16  IU/ml Final    QFT Final Interpretation 01/31/2022 Negative  Negative Final    No Interferon-gamma response to M  tuberculosis antigens detected  Infection with M  tuberculosis is unlikely  A single negative result does not exclude infection with M  tuberculosis  In patients at high risk for M  tuberculosis infection, a second test should be considered in accordance with the 2017 ATS/IDSA/CDC Clinical Practice Guidelines for Diagnosis of Tuberculosis in Adults and Children  False negative results can be a result of incorrect blood sample collection or handling of the specimen affecting lymphocyte function      Hepatitis B Surface Ag 01/31/2022 Non-reactive  Non-reactive, NonReactive - Confirmed Final    Hepatitis C Ab 01/31/2022 Non-reactive  Non-reactive Final    Hep B C IgM 01/31/2022 Non-reactive  Non-reactive Final    Hep B Core Total Ab 01/31/2022 Non-reactive  Non-reactive Final   Orders Only on 01/13/2022   Component Date Value Ref Range Status    SARS-CoV-2 01/13/2022 Negative  Negative Final

## 2022-05-31 NOTE — TELEPHONE ENCOUNTER
Per Laura ([facility] infusion): Hello,  I uploaded letter written by Dr Nery Jackson on 5/27--auth request was denied  Copy is uploaded in [facility]  I spoke with Burton Ordonez -next infusion is scheduled for 6/7  Dr Nery Jackson: How would you like to proceed? Peer to peer? Or change the biologic?

## 2022-06-01 ENCOUNTER — APPOINTMENT (OUTPATIENT)
Dept: LAB | Facility: CLINIC | Age: 27
End: 2022-06-01
Payer: COMMERCIAL

## 2022-06-01 ENCOUNTER — OFFICE VISIT (OUTPATIENT)
Dept: RHEUMATOLOGY | Facility: CLINIC | Age: 27
End: 2022-06-01
Payer: COMMERCIAL

## 2022-06-01 VITALS
WEIGHT: 208.8 LBS | DIASTOLIC BLOOD PRESSURE: 71 MMHG | BODY MASS INDEX: 33.56 KG/M2 | SYSTOLIC BLOOD PRESSURE: 130 MMHG | HEIGHT: 66 IN

## 2022-06-01 DIAGNOSIS — M19.90 INFLAMMATORY ARTHRITIS: ICD-10-CM

## 2022-06-01 DIAGNOSIS — M25.562 ARTHRALGIA OF BOTH KNEES: ICD-10-CM

## 2022-06-01 DIAGNOSIS — K50.119 CROHN'S DISEASE OF COLON WITH COMPLICATION (HCC): Primary | ICD-10-CM

## 2022-06-01 DIAGNOSIS — M25.561 ARTHRALGIA OF BOTH KNEES: ICD-10-CM

## 2022-06-01 DIAGNOSIS — R79.89 LOW VITAMIN D LEVEL: ICD-10-CM

## 2022-06-01 LAB
25(OH)D3 SERPL-MCNC: 28 NG/ML (ref 30–100)
CRP SERPL QL: 11.7 MG/L
ERYTHROCYTE [SEDIMENTATION RATE] IN BLOOD: 3 MM/HOUR (ref 0–19)

## 2022-06-01 PROCEDURE — 99205 OFFICE O/P NEW HI 60 MIN: CPT | Performed by: INTERNAL MEDICINE

## 2022-06-01 PROCEDURE — 81374 HLA I TYPING 1 ANTIGEN LR: CPT | Performed by: INTERNAL MEDICINE

## 2022-06-01 PROCEDURE — 82306 VITAMIN D 25 HYDROXY: CPT | Performed by: INTERNAL MEDICINE

## 2022-06-01 PROCEDURE — 85652 RBC SED RATE AUTOMATED: CPT | Performed by: INTERNAL MEDICINE

## 2022-06-01 PROCEDURE — 36415 COLL VENOUS BLD VENIPUNCTURE: CPT | Performed by: INTERNAL MEDICINE

## 2022-06-01 PROCEDURE — 86140 C-REACTIVE PROTEIN: CPT | Performed by: INTERNAL MEDICINE

## 2022-06-01 PROCEDURE — 3008F BODY MASS INDEX DOCD: CPT | Performed by: INTERNAL MEDICINE

## 2022-06-01 RX ORDER — CELECOXIB 100 MG/1
100 CAPSULE ORAL 2 TIMES DAILY
Qty: 60 CAPSULE | Refills: 6 | Status: SHIPPED | OUTPATIENT
Start: 2022-06-01

## 2022-06-01 RX ORDER — SULFASALAZINE 500 MG/1
TABLET ORAL
Qty: 120 TABLET | Refills: 6 | Status: SHIPPED | OUTPATIENT
Start: 2022-06-01

## 2022-06-01 NOTE — PATIENT INSTRUCTIONS
Do labs  Continue Remicade infusions through GI  Start sulfasalazine 1 tab daily for a week, then 1 tab twice a day for a week, then 2 tabs twice a day  Can take celecoxib twice a day as needed for joint pain, take with food    Return to clinic in 4 months

## 2022-06-01 NOTE — TELEPHONE ENCOUNTER
Hi,    They can call me on my cell  The best hours are tomorrow after 4pm or Friday after 2pm     Thank you!

## 2022-06-01 NOTE — TELEPHONE ENCOUNTER
Dr Breanna Calzada - I will call insurance to initiate the request for a peer to peer  What is the best number to have them reach you at? Also, do you have a certain time frame that would work best for your schedule? Thanks!

## 2022-06-01 NOTE — TELEPHONE ENCOUNTER
Called insurance at number listed below  Left voicemail requesting peer to peer for 6/3 after 4pm or 6/4 after 2pm  Left cell phone number listed below to call

## 2022-06-03 ENCOUNTER — TELEPHONE (OUTPATIENT)
Dept: GASTROENTEROLOGY | Facility: MEDICAL CENTER | Age: 27
End: 2022-06-03

## 2022-06-03 NOTE — TELEPHONE ENCOUNTER
Hi,    I just completed the peer to peer  The physician on the line said it was not for Remicade, but still for Avsola, at every 4 week dosing  She did approve it, but ideally I wanted the patient to remain on Remicade  At this point I do not want to delay her care but can you confirm?     Thank you

## 2022-06-06 NOTE — TELEPHONE ENCOUNTER
Laura emailed stating she received a verbal approval      avsola () valid 5/23/2022-5/22/2023 for 13 infusions -[id number]

## 2022-06-09 LAB — HLA-B27 QL NAA+PROBE: NEGATIVE

## 2022-06-28 ENCOUNTER — OFFICE VISIT (OUTPATIENT)
Dept: GASTROENTEROLOGY | Facility: MEDICAL CENTER | Age: 27
End: 2022-06-28
Payer: COMMERCIAL

## 2022-06-28 VITALS
DIASTOLIC BLOOD PRESSURE: 86 MMHG | SYSTOLIC BLOOD PRESSURE: 130 MMHG | WEIGHT: 207 LBS | HEART RATE: 80 BPM | TEMPERATURE: 97.3 F | BODY MASS INDEX: 33.41 KG/M2

## 2022-06-28 DIAGNOSIS — T75.3XXA SEVERE MOTION SICKNESS, INITIAL ENCOUNTER: ICD-10-CM

## 2022-06-28 DIAGNOSIS — M25.562 ARTHRALGIA OF BOTH KNEES: ICD-10-CM

## 2022-06-28 DIAGNOSIS — D50.9 IRON DEFICIENCY ANEMIA, UNSPECIFIED IRON DEFICIENCY ANEMIA TYPE: ICD-10-CM

## 2022-06-28 DIAGNOSIS — E46 PROTEIN-CALORIE MALNUTRITION, UNSPECIFIED SEVERITY (HCC): ICD-10-CM

## 2022-06-28 DIAGNOSIS — E53.8 LOW VITAMIN B12 LEVEL: ICD-10-CM

## 2022-06-28 DIAGNOSIS — K50.119 CROHN'S DISEASE OF COLON WITH COMPLICATION (HCC): Primary | ICD-10-CM

## 2022-06-28 DIAGNOSIS — R11.0 NAUSEA: ICD-10-CM

## 2022-06-28 DIAGNOSIS — R15.2 FECAL URGENCY: ICD-10-CM

## 2022-06-28 DIAGNOSIS — M25.561 ARTHRALGIA OF BOTH KNEES: ICD-10-CM

## 2022-06-28 DIAGNOSIS — D84.9 IMMUNOCOMPROMISED PATIENT (HCC): ICD-10-CM

## 2022-06-28 DIAGNOSIS — R19.7 DIARRHEA, UNSPECIFIED TYPE: ICD-10-CM

## 2022-06-28 PROCEDURE — 99214 OFFICE O/P EST MOD 30 MIN: CPT | Performed by: INTERNAL MEDICINE

## 2022-06-28 PROCEDURE — 1036F TOBACCO NON-USER: CPT | Performed by: INTERNAL MEDICINE

## 2022-06-28 RX ORDER — SCOLOPAMINE TRANSDERMAL SYSTEM 1 MG/1
1 PATCH, EXTENDED RELEASE TRANSDERMAL
Qty: 10 PATCH | Refills: 0 | Status: SHIPPED | OUTPATIENT
Start: 2022-06-28

## 2022-06-28 RX ORDER — ONDANSETRON 4 MG/1
4 TABLET, ORALLY DISINTEGRATING ORAL EVERY 6 HOURS PRN
Qty: 20 TABLET | Refills: 0 | Status: SHIPPED | OUTPATIENT
Start: 2022-06-28

## 2022-06-28 NOTE — PROGRESS NOTES
[facility] - Outpatient Follow-up Note  Maureen Kelley 33 y o  female MRN: [id number]  Encounter: [id number]          ASSESSMENT AND PLAN:    Maureen Kelley is a 33 y o  female who presents with complaint of ileocolonic Crohn's disease diagnosed in 2019 here for follow-up  She was previously on Humira but did not achieve adequate levels despite twice weekly dosing and she was subsequently switched to Remicade  She did well with induction but subsequently had increased symptoms with active disease and her Remicade dosing was adjusted was adjusted  Overall she is feeling better at this time but still with some ongoing symptoms and disease is mildly active  Recent EGD in April grossly normal   Biopsies with inactive gastritis but no H pylori  Colonoscopy with nodularity in the terminal ileum and scattered benign-appearing pseudopolyps but overall grossly normal mucosa  Biopsies with inflammatory type pseudo polyp, some chronic mildly active colitis with chronicity in the rectum, some quiescent colitis noted throughout the transverse and descending colon  CT abdomen pelvis from June 2020 with acute pancolitis  Vitamin-D level 28  CRP 11 7  Remicade level 0 4 with anti drug antibodies of 70  Cm AP with albumin of 3 but otherwise relatively normal   Ferritin of 8  CBC with hemoglobin 11 3 but normal white blood cell count and platelets    1  Crohn's disease of colon with complication ([address] )    2  Immunocompromised patient ([address] )    3  Diarrhea, unspecified type    4  Arthralgia of both knees    5  Low vitamin B12 level    6  Iron deficiency anemia, unspecified iron deficiency anemia type    7  Fecal urgency    8  Severe motion sickness, initial encounter    9  Nausea    10   Protein-calorie malnutrition, unspecified severity ([address] )        Orders Placed This Encounter   Procedures    CBC and differential    Comprehensive metabolic panel    C-reactive protein    Ferritin    Iron    Transferrin    Infliximab Concentration and Anti-Infliximab Antibody     Continue Remicade 5 milligram/kilogram every 28 days  Repeat Remicade level trough  Can also repeat CMP, CBC, iron studies  Start sulfasalazine as per rheumatologist  Add folic acid  Continue iron supplementation  Okay to use Imodium as needed  Scopolamine patch as needed  Repeat QuantiFERON gold and hepatitis panel January 2023  Vitamin-D supplementation  Vitamin B12 supplementation  Minimize NSAIDs  Avoid smoking  Avoid live virus vaccines  Yearly flu shot  Pneumonia vaccine  COVID vaccine in booster  Shingrix  Routine skin exams  Follow-up with Rheumatology as needed  ______________________________________________________________________    SUBJECTIVE:    Jason Ryan is a 33 y o  female who presents with complaint of crohn's  She feels better  Less frequency and urgency  Two biggest things that have improved  She has 2 BMs on a good day, 4 on a bad day  Can be formed or watery  Not consistent  No BRBPR or black stools  Some urgency but improved and no accidents  No abdominal pain  No rashes, mouth sores, joint pains  No heartburn, dysphagia, odynophagia, nausea, vomiting, weight loss       Answers for HPI/ROS submitted by the patient on 6/28/2022  When you are not experiencing symptoms of your inflammatory bowel disease, how many bowel movements do you typically have each day?: 2  What is the average (typical) number of bowel movements that you had in a single day during the last week?: 4  Over the last 3 days, have you had any bowel movements where you passed blood without stool?: No  Since your last visit, have you received any vaccinations?: Yes  Since the last visit, have you had an infection?: No  Is there any possibility that you may be pregnant?: No  In the past three months, have you used tobacco in any form?: No  During the last year, how many days have you missed work or school because of your inflammatory bowel disease?: 0  During the last year, how many days have you been hospitalized because of your inflammatory bowel disease?: 0  During the last year, how many days have you visited a hospital emergency department because of your inflammatory bowel disease?: 0  During the last month, have you taken narcotic pain medications (such as Percocet, oxycodone, Oxycontin, morphine, Vicodin, Dilaudid, MS Contin) for your inflammatory bowel disease?: No  Have you awoken at night because you needed to move your bowels during the last month? : No  Have you had leakage of stool while sleeping during the last month?: No  Have you had leakage of stool while you were awake during the last month?: No  In the last 6 months, have you unintentionally lost weight?: No  Fever: No  Eye irritation: No  Mouth sores: No  Sore throat: No  During the last 3 days, have you had chest pain?: No  Shortness of breath: No  Numbness or tingling in your hands or feet: No  Skin rash: No  Pain or swelling in your joints: Yes  Bruising or bleeding: No  Felt depressed or blue: Yes        REVIEW OF SYSTEMS IS OTHERWISE NEGATIVE    10 point ROS reviewed and negative, except as above      Historical Information   Past Medical History:   Diagnosis Date    Crohn's colitis ([facility] )     Crohn's disease ([facility] )      Past Surgical History:   Procedure Laterality Date    COLONOSCOPY       Social History   Social History     Substance and Sexual Activity   Alcohol Use Yes    Comment: social     Social History     Substance and Sexual Activity   Drug Use Never     Social History     Tobacco Use   Smoking Status Never Smoker   Smokeless Tobacco Never Used     Family History   Problem Relation Age of Onset    Breast cancer Maternal Grandmother        Meds/Allergies       Current Outpatient Medications:     celecoxib (CeleBREX) 100 mg capsule    drospirenone-ethinyl estradiol (OTTONIEL) 3-0 02 MG per tablet    escitalopram (LEXAPRO) 20 mg tablet    ferrous sulfate 324 (65 Fe) mg    inFLIXimab (Remicade) 100 mg    ondansetron (ZOFRAN-ODT) 4 mg disintegrating tablet    scopolamine (TRANSDERM-SCOP) 1 mg/3 days TD 72 hr patch    sulfaSALAzine (AZULFIDINE) 500 mg tablet    Allergies   Allergen Reactions    Penicillins Rash           Objective     Blood pressure 130/86, pulse 80, temperature (!) 97 3 °F (36 3 °C), temperature source Probe, weight 93 9 kg (207 lb), not currently breastfeeding  Body mass index is 33 41 kg/m²  PHYSICAL EXAMINATION:    General Appearance:   Alert, cooperative, no distress   HEENT:  Normocephalic, atraumatic, anicteric  Neck supple, symmetrical, trachea midline  Lungs:   Equal chest rise and unlabored breathing, normal effort, no coughing  Cardiovascular:   No visualized JVD  Abdomen:   No abdominal distension  Skin:   No jaundice, rashes, or lesions  Musculoskeletal:   Normal range of motion visualized  Psych:  Normal affect and normal insight  Neuro:  Alert and appropriate  Lab Results:   No visits with results within 1 Day(s) from this visit     Latest known visit with results is:   Office Visit on 06/01/2022   Component Date Value    HLA B27 06/01/2022 Negative     Sed Rate 06/01/2022 3     CRP 06/01/2022 11 7 (A)    Vit D, 25-Hydroxy 06/01/2022 28 0 (A)       Lab Results   Component Value Date    WBC 8 52 01/31/2022    HGB 11 3 (L) 01/31/2022    HCT 35 4 01/31/2022    MCV 83 01/31/2022     01/31/2022       Lab Results   Component Value Date    SODIUM 136 01/31/2022    K 4 4 01/31/2022     01/31/2022    CO2 24 01/31/2022    AGAP 6 01/31/2022    BUN 12 01/31/2022    CREATININE 0 82 01/31/2022    GLUC 82 06/28/2021    GLUF 80 01/31/2022    CALCIUM 9 9 01/31/2022    AST 12 01/31/2022    ALT 18 01/31/2022    ALKPHOS 90 01/31/2022    TP 7 7 01/31/2022    TBILI 0 32 01/31/2022    EGFR 99 01/31/2022       Lab Results   Component Value Date    CRP 11 7 (H) 06/01/2022       Lab Results   Component Value Date [id number] 2 830 06/30/2021       Lab Results   Component Value Date    IRON 52 01/31/2022    TIBC 241 (L) 06/12/2020    FERRITIN 8 01/31/2022       Radiology Results:   No results found

## 2022-09-02 ENCOUNTER — ANNUAL EXAM (OUTPATIENT)
Dept: GYNECOLOGY | Facility: CLINIC | Age: 27
End: 2022-09-02
Payer: COMMERCIAL

## 2022-09-02 VITALS
BODY MASS INDEX: 34.72 KG/M2 | HEIGHT: 66 IN | SYSTOLIC BLOOD PRESSURE: 122 MMHG | WEIGHT: 216 LBS | DIASTOLIC BLOOD PRESSURE: 80 MMHG

## 2022-09-02 DIAGNOSIS — Z30.41 SURVEILLANCE FOR BIRTH CONTROL, ORAL CONTRACEPTIVES: ICD-10-CM

## 2022-09-02 DIAGNOSIS — Z12.39 ENCOUNTER FOR SCREENING BREAST EXAMINATION: ICD-10-CM

## 2022-09-02 DIAGNOSIS — Z01.419 ENCOUNTER FOR WELL WOMAN EXAM: Primary | ICD-10-CM

## 2022-09-02 DIAGNOSIS — R87.611 PAP SMEAR OF CERVIX WITH ASCUS, CANNOT EXCLUDE HGSIL: ICD-10-CM

## 2022-09-02 PROCEDURE — S0612 ANNUAL GYNECOLOGICAL EXAMINA: HCPCS | Performed by: PHYSICIAN ASSISTANT

## 2022-09-02 RX ORDER — DROSPIRENONE AND ETHINYL ESTRADIOL 0.02-3(28)
1 KIT ORAL DAILY
Qty: 84 TABLET | Refills: 4 | Status: SHIPPED | OUTPATIENT
Start: 2022-09-02

## 2022-09-02 NOTE — PROGRESS NOTES
Assessment/Plan:    No problem-specific Assessment & Plan notes found for this encounter  Diagnoses and all orders for this visit:    Encounter for well woman exam    Encounter for screening breast examination    Pap smear of cervix with ASCUS, cannot exclude HGSIL  -     Liquid-based pap, diagnostic    Surveillance for birth control, oral contraceptives  -     drospirenone-ethinyl estradiol (OTTONIEL) 3-0 02 MG per tablet; Take 1 tablet by mouth daily          Subjective:      Patient ID: Radha Diaz is a 33 y o  female  Pt presents for her annual exam today--  She has no complaints except slightly decreased libido  She has reg bleeding  no pelvic pain  On OCP  Bowel and bladder are regular  No breast concerns today    No pap today  H/o hpv  rz ottoniel  Add b and d      The following portions of the patient's history were reviewed and updated as appropriate: allergies, current medications, past family history, past medical history, past social history, past surgical history and problem list     Review of Systems   Constitutional: Negative for chills, fever and unexpected weight change  Gastrointestinal: Negative for abdominal pain, blood in stool, constipation and diarrhea  Genitourinary: Negative  Objective:      /80   Ht 5' 6" (1 676 m)   Wt 98 kg (216 lb)   LMP 08/23/2022 (Exact Date)   BMI 34 86 kg/m²          Physical Exam  Vitals and nursing note reviewed  Constitutional:       Appearance: She is well-developed  HENT:      Head: Normocephalic and atraumatic  Chest:   Breasts:      Right: No inverted nipple, mass, nipple discharge or skin change  Left: No inverted nipple, mass, nipple discharge or skin change  Abdominal:      Palpations: Abdomen is soft  Genitourinary:     Exam position: Supine  Labia:         Right: No rash, tenderness or lesion  Left: No rash, tenderness or lesion         Vagina: Normal       Cervix: No cervical motion tenderness, discharge or friability  Adnexa:         Right: No mass, tenderness or fullness  Left: No mass, tenderness or fullness  Musculoskeletal:      Cervical back: Normal range of motion  Lymphadenopathy:      Lower Body: No right inguinal adenopathy  No left inguinal adenopathy

## 2022-09-08 DIAGNOSIS — F41.9 ANXIETY: ICD-10-CM

## 2022-09-09 RX ORDER — ESCITALOPRAM OXALATE 20 MG/1
20 TABLET ORAL DAILY
Qty: 90 TABLET | Refills: 1 | Status: SHIPPED | OUTPATIENT
Start: 2022-09-09

## 2022-09-09 RX ORDER — ESCITALOPRAM OXALATE 20 MG/1
20 TABLET ORAL DAILY
Qty: 90 TABLET | Refills: 0 | Status: SHIPPED | OUTPATIENT
Start: 2022-09-09 | End: 2022-09-09

## 2022-10-26 NOTE — TELEPHONE ENCOUNTER
MEDICATION HOLD REQUEST:    Our mutual patient is scheduled for procedure: Colon/EGD     On: 04/01/2022     With: Dr Maru Parikh    She is taking the medication: Iron     Can this be stopped 5 days prior to the procedure?        Physician Approving clearance: ________________________ no

## 2022-10-27 NOTE — TELEPHONE ENCOUNTER
Hi,    I placed a referral to dermatology and no one called her yet  Are you able to see if someone from derm can reach out to her so she can get an appointment?     Thank you [Follow-up] : a follow-up of an existing diagnosis [FreeTextEntry1] : Constipation

## 2022-11-15 ENCOUNTER — OFFICE VISIT (OUTPATIENT)
Dept: GASTROENTEROLOGY | Facility: CLINIC | Age: 27
End: 2022-11-15

## 2022-11-15 VITALS
HEIGHT: 66 IN | WEIGHT: 218.4 LBS | BODY MASS INDEX: 35.1 KG/M2 | SYSTOLIC BLOOD PRESSURE: 126 MMHG | TEMPERATURE: 98.7 F | DIASTOLIC BLOOD PRESSURE: 72 MMHG

## 2022-11-15 DIAGNOSIS — D64.9 ANEMIA, UNSPECIFIED TYPE: ICD-10-CM

## 2022-11-15 DIAGNOSIS — D84.9 IMMUNOCOMPROMISED STATE (HCC): ICD-10-CM

## 2022-11-15 DIAGNOSIS — D50.9 IRON DEFICIENCY ANEMIA, UNSPECIFIED IRON DEFICIENCY ANEMIA TYPE: ICD-10-CM

## 2022-11-15 DIAGNOSIS — E55.9 VITAMIN D DEFICIENCY: ICD-10-CM

## 2022-11-15 DIAGNOSIS — K50.119 CROHN'S DISEASE OF COLON WITH COMPLICATION (HCC): Primary | ICD-10-CM

## 2022-11-15 DIAGNOSIS — D84.9 IMMUNOCOMPROMISED PATIENT (HCC): ICD-10-CM

## 2022-11-15 DIAGNOSIS — E53.8 LOW VITAMIN B12 LEVEL: ICD-10-CM

## 2022-11-15 NOTE — PROGRESS NOTES
[facility] - Outpatient Follow-up Note  Sheree Duggan 33 y o  female MRN: [id number]  Encounter: [id number]          ASSESSMENT AND PLAN:    Sheree Duggan is a 33 y o  female with ileocolonic Crohn's disease diagnosed in 2019, previously on Humira but without adequate levels despite twice weekly dosing and subsequently switched to Remicade for which he did well with induction but subsequently had to increase her dose given active disease and low infliximab level  She was last seen in June and at that time had overall been feeling better but still with some active symptoms, now presenting for follow-up  Currently she feels well and no issues or active symptoms  Endoscopy and colonoscopy from April notable for relatively normal upper exam, nodularity in the distal terminal ileum, scattered benign pseudo polyps in the transverse colon, sessile polyp in the rectum  Biopsies with benign duodenum, inactive gastritis without H pylori, benign esophagus, benign terminal ileum, evidence of quiescent inflammatory bowel disease in transverse and descending colons, mildly active colitis in the rectum, inflammatory polyp in the rectum  CT exam from 2020 with pan colitis but no perforation or fluid collection  Upper GI series from 2019 with mild narrowing terminal ileum  Blood work from January notable for CMP with albumin of 3 and corrected calcium of 10 7 but otherwise normal   Ferritin low at 8, iron 52  Vitamin-D most recently 28 and vitamin B12 380  White blood cell count 8 52 with hemoglobin of 11 3 and normal platelets and MCV  Sed rate 3  CRP 11 7  HLA B27 negative  Hepatitis profile and QuantiFERON gold negative as of January 2022  Prior infliximab level was 0 4 with anti drug antibodies of 70      1  Crohn's disease of colon with complication ([facility] )    2  Immunocompromised patient ([facility] )    3  Low vitamin B12 level    4   Iron deficiency anemia, unspecified iron deficiency anemia type    5  Immunocompromised state ([place] [address] )    6  Anemia, unspecified type    7  Vitamin D deficiency        Orders Placed This Encounter   Procedures   • MRI enterography w wo   • CBC and differential   • Comprehensive metabolic panel   • C-reactive protein   • Vitamin B12   • Vitamin D 25 hydroxy   • Infliximab Concentration and Anti-Infliximab Antibody   • Quantiferon TB Gold Plus   • Chronic Hepatitis Panel     Continue infliximab every 28 days  Continue to follow-up with rheumatology as needed  Next blood work do now including CBC, CMP, CRP, infliximab level, iron studies, vitamin B12, vitamin-D levels  Next QuantiFERON gold and hepatitis panel due between now in January 2023  Next MR enterography due now  Next colonoscopy Spring 2024    Avoid live virus vaccines  Yearly flu shot  COVID vaccine and booster  Pneumonia vaccine  Shingrix  Routine Pap smears  Routine skin exams with the dermatologist    ______________________________________________________________________    SUBJECTIVE:    Rakel Livingston is a 33 y o  female who presents with complaint of Crohn's  No issues recently  2 BMs per day, usually formed but soft  No bright red blood per rectum/rectal bleeding, no melena  No urgency, nono nocturnal BMs, No incontinence  No abdominal pain  no rashes, no mouth sores, no joint pains  no heartburn, dysphagia, odynophagia, nausea, vomiting  no weight loss  REVIEW OF SYSTEMS IS OTHERWISE NEGATIVE    10 point ROS reviewed and negative, except as above      Historical Information   Past Medical History:   Diagnosis Date   • Crohn's colitis ([place] [address] )    • Crohn's disease ([place] [address] )      Past Surgical History:   Procedure Laterality Date   • COLONOSCOPY       Social History   Social History     Substance and Sexual Activity   Alcohol Use Yes   • Alcohol/week: 3 0 standard drinks   • Types: 2 Glasses of wine, 1 Cans of beer per week     Social History     Substance and Sexual Activity   Drug Use Never     Social History     Tobacco Use   Smoking Status Never Smoker   Smokeless Tobacco Never Used     Family History   Problem Relation Age of Onset   • Breast cancer Maternal Grandmother        Meds/Allergies       Current Outpatient Medications:   •  celecoxib (CeleBREX) 100 mg capsule  •  drospirenone-ethinyl estradiol (OTTONIEL) 3-0 02 MG per tablet  •  escitalopram (LEXAPRO) 20 mg tablet  •  ferrous sulfate 324 (65 Fe) mg  •  inFLIXimab (Remicade) 100 mg  •  ondansetron (ZOFRAN-ODT) 4 mg disintegrating tablet  •  scopolamine (TRANSDERM-SCOP) 1 mg/3 days TD 72 hr patch  •  sulfaSALAzine (AZULFIDINE) 500 mg tablet    Allergies   Allergen Reactions   • Penicillins Rash           Objective     Blood pressure 126/72, temperature 98 7 °F (37 1 °C), height 5' 6" (1 676 m), weight 99 1 kg (218 lb 6 4 oz), not currently breastfeeding  Body mass index is 35 25 kg/m²  PHYSICAL EXAMINATION:    General Appearance:   Alert, cooperative, no distress   HEENT:  Normocephalic, atraumatic, anicteric  Neck supple, symmetrical, trachea midline  Lungs:   Equal chest rise and unlabored breathing, normal effort, no coughing  Cardiovascular:   No visualized JVD  Abdomen:   No abdominal distension  Skin:   No jaundice, rashes, or lesions  Musculoskeletal:   Normal range of motion visualized  Psych:  Normal affect and normal insight  Neuro:  Alert and appropriate  Lab Results:   No visits with results within 1 Day(s) from this visit     Latest known visit with results is:   Office Visit on 06/01/2022   Component Date Value   • HLA B27 06/01/2022 Negative    • Sed Rate 06/01/2022 3    • CRP 06/01/2022 11 7 (A)   • Vit D, 25-Hydroxy 06/01/2022 28 0 (A)       Lab Results   Component Value Date    WBC 8 52 01/31/2022    HGB 11 3 (L) 01/31/2022    HCT 35 4 01/31/2022    MCV 83 01/31/2022     01/31/2022       Lab Results   Component Value Date    SODIUM 136 01/31/2022    K 4 4 01/31/2022     01/31/2022    CO2 24 01/31/2022 AGAP 6 01/31/2022    BUN 12 01/31/2022    CREATININE 0 82 01/31/2022    GLUC 82 06/28/2021    GLUF 80 01/31/2022    CALCIUM 9 9 01/31/2022    AST 12 01/31/2022    ALT 18 01/31/2022    ALKPHOS 90 01/31/2022    TP 7 7 01/31/2022    TBILI 0 32 01/31/2022    EGFR 99 01/31/2022       Lab Results   Component Value Date    CRP 11 7 (H) 06/01/2022       Lab Results   Component Value Date    [id number] 2 830 06/30/2021       Lab Results   Component Value Date    IRON 52 01/31/2022    TIBC 241 (L) 06/12/2020    FERRITIN 8 01/31/2022       Radiology Results:   No results found

## 2022-11-23 NOTE — TELEPHONE ENCOUNTER
Specialty Pharmacy - Refill Coordination  Specialty Pharmacy - Clinical Reassessment    Specialty Medication Orders Linked to Encounter      Flowsheet Row Most Recent Value   Medication #1 ustekinumab (STELARA) 90 mg/mL Syrg syringe (Order#[id number], Rx#[id number])          Patient Diagnosis   K50.012 - Crohn's disease of small intestine with intestinal obstruction    Nina Dickerson is a 26 y.o. female, who is followed by the specialty pharmacy service for management and education of her Stelara.  She has been on therapy with Stelara for 12 months.  I have reviewed her electronic medical record and current medication list and determined that specialty medication adjustment Is not needed at this time.    Patient has not experienced adverse events.  She Is adherent reporting 0 missed doses since last review.  Adherence has been encouraged with the following mechanism(s): proactive refills calls + dose due dates are marked on her desk calendar at work.  She is meeting goals of therapy and will continue treatment.        9/27/2022 8/3/2022 6/16/2022 4/11/2022 2/22/2022 2/11/2022 12/20/2021   Follow Up Review   # of missed doses 0 0 0 0 0 0    New Medications? No No Yes       bupropion    No No No No    New Conditions? No No No No No No    New Allergies? No No No No No No    Med Effective? Good Excellent Very good Good Good Good    Missed activities?       No   Urgent Care? No No No No No No    Requested Pharmacist? No No No No No No        Multiple values from one day are sorted in reverse-chronological order         Therapy is appropriate to continue.    Therapy is effective: Yes  On scale of 1 to 10, how does patient rank quality of life? (10 - Best): 9 (patient states 9.5, due to intermittent episodes to nausea that come up 1-2 times/month)  Recommendations:  Patient to see Gi provider in 6 months after yesterdays follow-up. Nausea is managed with promethazine PRN and usually subsides in a few hours. Patient is  Thank you everyone! able to complete daily activities, and has been able to go to work regularly since being on Stelara -- previously, Crohn's ws very severs as she was missing work, having a lot of GI discomfort and irregular bowel movements. She is being evaluated for cause of nausea, but does not appear to be related to Crohn's per providers.   Review Method: Patient Contact    Tasks added this encounter   No tasks added.   Tasks due within next 3 months   9/20/2022 - Clinical - Follow Up Assesement (Annual)  11/19/2022 - Refill Call (Auto Added)     Hanna Stratton, PharmD  [facility]  [address] [facility] [address]  Phone: [phone]  Fax: [phone]

## 2022-11-25 ENCOUNTER — HOSPITAL ENCOUNTER (OUTPATIENT)
Dept: MRI IMAGING | Facility: HOSPITAL | Age: 27
End: 2022-11-25
Attending: INTERNAL MEDICINE

## 2022-11-25 DIAGNOSIS — D50.9 IRON DEFICIENCY ANEMIA, UNSPECIFIED IRON DEFICIENCY ANEMIA TYPE: ICD-10-CM

## 2022-11-25 DIAGNOSIS — D64.9 ANEMIA, UNSPECIFIED TYPE: ICD-10-CM

## 2022-11-25 DIAGNOSIS — D84.9 IMMUNOCOMPROMISED STATE (HCC): ICD-10-CM

## 2022-11-25 DIAGNOSIS — E53.8 LOW VITAMIN B12 LEVEL: ICD-10-CM

## 2022-11-25 DIAGNOSIS — E55.9 VITAMIN D DEFICIENCY: ICD-10-CM

## 2022-11-25 DIAGNOSIS — K50.119 CROHN'S DISEASE OF COLON WITH COMPLICATION (HCC): ICD-10-CM

## 2022-11-25 DIAGNOSIS — D84.9 IMMUNOCOMPROMISED PATIENT (HCC): ICD-10-CM

## 2022-11-25 RX ADMIN — GLUCAGON 1 MG: KIT at 09:09

## 2022-11-25 RX ADMIN — GADOBUTROL 9 ML: 604.72 INJECTION INTRAVENOUS at 09:09

## 2022-12-09 ENCOUNTER — OFFICE VISIT (OUTPATIENT)
Dept: URGENT CARE | Facility: CLINIC | Age: 27
End: 2022-12-09

## 2022-12-09 ENCOUNTER — TELEPHONE (OUTPATIENT)
Dept: FAMILY MEDICINE CLINIC | Facility: CLINIC | Age: 27
End: 2022-12-09

## 2022-12-09 VITALS
HEART RATE: 88 BPM | HEIGHT: 66 IN | BODY MASS INDEX: 35.25 KG/M2 | RESPIRATION RATE: 16 BRPM | OXYGEN SATURATION: 99 % | TEMPERATURE: 97.6 F

## 2022-12-09 DIAGNOSIS — J20.9 ACUTE BRONCHITIS, UNSPECIFIED ORGANISM: Primary | ICD-10-CM

## 2022-12-09 RX ORDER — PREDNISONE 50 MG/1
50 TABLET ORAL DAILY
Qty: 5 TABLET | Refills: 0 | Status: SHIPPED | OUTPATIENT
Start: 2022-12-09 | End: 2022-12-14

## 2022-12-09 RX ORDER — BENZONATATE 200 MG/1
200 CAPSULE ORAL 3 TIMES DAILY PRN
Qty: 20 CAPSULE | Refills: 0 | Status: SHIPPED | OUTPATIENT
Start: 2022-12-09

## 2022-12-09 RX ORDER — ALBUTEROL SULFATE 90 UG/1
2 AEROSOL, METERED RESPIRATORY (INHALATION) EVERY 6 HOURS PRN
Qty: 8.5 G | Refills: 0 | Status: SHIPPED | OUTPATIENT
Start: 2022-12-09

## 2022-12-09 NOTE — PATIENT INSTRUCTIONS
Take Prednisone as prescribed  Albuterol inhaler as prescribed as needed  See attached instructions for use  Take Tessalon Perles  If symptoms are not improved in 3-5 days, follow-up with PCP  If you develop any chest pain or shortness of breath, report to the emergency department

## 2022-12-09 NOTE — PROGRESS NOTES
[facility]        NAME: Marla Cordero is a 33 y o  female  : 1995    MRN: [id number]  DATE: 2022  TIME: 4:25 PM    Assessment and Plan   Acute bronchitis, unspecified organism [J20 9]  1  Acute bronchitis, unspecified organism  predniSONE 50 mg tablet    albuterol (ProAir HFA) 90 mcg/act inhaler    benzonatate (TESSALON) 200 MG capsule      Pt presents with symptoms and examination consistent with viral bronchitis  Recommend prednisone, albuterol inhaler, and Tessalon Perles to treat  Follow-up with PCP in 3-5 days if symptoms are not improved  Report to the emergency department if any shortness of breath or chest pain develop  Patient Instructions     Patient Instructions   Take Prednisone as prescribed  Albuterol inhaler as prescribed as needed  See attached instructions for use  Take Tessalon Perles  If symptoms are not improved in 3-5 days, follow-up with PCP  If you develop any chest pain or shortness of breath, report to the emergency department  Follow up with PCP in 3-5 days  Proceed to  ER if symptoms worsen  Chief Complaint     Chief Complaint   Patient presents with   • Cough     Cough x 2 weeks pt feels its not getting any better and it wakes her up at night         History of Present Illness       33year old female presents with cough x 2 weeks  Pt notes some soreness in the chest when coughing and reports mild difficulty catching her breath after coughing  Review of Systems   Review of Systems   Constitutional: Negative for chills, fatigue and fever  HENT: Negative for congestion, postnasal drip, rhinorrhea, sore throat, trouble swallowing and voice change  Respiratory: Positive for cough and chest tightness  Negative for shortness of breath  Cardiovascular: Negative for chest pain  Gastrointestinal: Negative for diarrhea, nausea and vomiting  Musculoskeletal: Negative for myalgias  Neurological: Negative for headaches  Current Medications       Current Outpatient Medications:   •  albuterol (ProAir HFA) 90 mcg/act inhaler, Inhale 2 puffs every 6 (six) hours as needed for wheezing, Disp: 8 5 g, Rfl: 0  •  benzonatate (TESSALON) 200 MG capsule, Take 1 capsule (200 mg total) by mouth 3 (three) times a day as needed for cough, Disp: 20 capsule, Rfl: 0  •  celecoxib (CeleBREX) 100 mg capsule, Take 1 capsule (100 mg total) by mouth 2 (two) times a day As needed for joint pain, take with food, Disp: 60 capsule, Rfl: 6  •  drospirenone-ethinyl estradiol (OTTONIEL) 3-0 02 MG per tablet, Take 1 tablet by mouth daily, Disp: 84 tablet, Rfl: 4  •  escitalopram (LEXAPRO) 20 mg tablet, Take 1 tablet (20 mg total) by mouth daily, Disp: 90 tablet, Rfl: 1  •  ferrous sulfate 324 (65 Fe) mg, Take 1 tablet (324 mg total) by mouth 2 (two) times a day before meals, Disp: 60 tablet, Rfl: 3  •  inFLIXimab (Remicade) 100 mg, Infuse 449 mg into a venous catheter every 28 days, Disp: 5 each, Rfl: 6  •  ondansetron (ZOFRAN-ODT) 4 mg disintegrating tablet, Take 1 tablet (4 mg total) by mouth every 6 (six) hours as needed for nausea or vomiting, Disp: 20 tablet, Rfl: 0  •  predniSONE 50 mg tablet, Take 1 tablet (50 mg total) by mouth daily for 5 days, Disp: 5 tablet, Rfl: 0  •  scopolamine (TRANSDERM-SCOP) 1 mg/3 days TD 72 hr patch, Place 1 patch on the skin every third day, Disp: 10 patch, Rfl: 0  •  sulfaSALAzine (AZULFIDINE) 500 mg tablet, Take 1 tab daily for a week, then 1 tab twice a day for a week, then 2 tabs twice a day, Disp: 120 tablet, Rfl: 6    Current Allergies     Allergies as of 12/09/2022 - Reviewed 12/09/2022   Allergen Reaction Noted   • Penicillins Rash 03/26/2019            The following portions of the patient's history were reviewed and updated as appropriate: allergies, current medications, past family history, past medical history, past social history, past surgical history and problem list      Past Medical History:   Diagnosis Date   • Crohn's colitis ([facility] [address] )    • Crohn's disease [facility])        Past Surgical History:   Procedure Laterality Date   • COLONOSCOPY         Family History   Problem Relation Age of Onset   • Breast cancer Maternal Grandmother          Medications have been verified  Objective   Pulse 88   Temp 97 6 °F (36 4 °C)   Resp 16   Ht 5' 6" (1 676 m)   SpO2 99%   BMI 35 25 kg/m²   No LMP recorded  Physical Exam     Physical Exam  Vitals and nursing note reviewed  Constitutional:       General: She is not in acute distress  Appearance: Normal appearance  She is not ill-appearing or toxic-appearing  HENT:      Head: Normocephalic and atraumatic  Jaw: No trismus  Right Ear: Tympanic membrane, ear canal and external ear normal  There is no impacted cerumen  No foreign body  Left Ear: Tympanic membrane, ear canal and external ear normal  There is no impacted cerumen  No foreign body  Nose: No nasal deformity, mucosal edema, congestion or rhinorrhea  Right Nostril: No foreign body, epistaxis or occlusion  Left Nostril: No foreign body, epistaxis or occlusion  Right Turbinates: Not enlarged, swollen or pale  Left Turbinates: Not enlarged, swollen or pale  Mouth/Throat:      Lips: Pink  No lesions  Mouth: Mucous membranes are moist  No injury, oral lesions or angioedema  Dentition: Normal dentition  Tongue: No lesions  Tongue does not deviate from midline  Palate: No mass and lesions  Pharynx: Uvula midline  No pharyngeal swelling, oropharyngeal exudate, posterior oropharyngeal erythema or uvula swelling  Tonsils: No tonsillar exudate or tonsillar abscesses  Eyes:      General: Lids are normal  Gaze aligned appropriately  No allergic shiner  Extraocular Movements: Extraocular movements intact  Cardiovascular:      Rate and Rhythm: Normal rate and regular rhythm        Heart sounds: Normal heart sounds, S1 normal and S2 normal  Heart sounds not distant  No murmur heard  No friction rub  No gallop  Pulmonary:      Effort: Pulmonary effort is normal       Breath sounds: Normal breath sounds  No decreased breath sounds, wheezing, rhonchi or rales  Comments: Patient speaking in full sentences with no increased respiratory effort  No audible wheezing or stridor  Lymphadenopathy:      Cervical: No cervical adenopathy  Skin:     General: Skin is warm and dry  Neurological:      Mental Status: She is alert and oriented to person, place, and time  Coordination: Coordination is intact  Gait: Gait is intact  Psychiatric:         Attention and Perception: Attention and perception normal          Mood and Affect: Mood and affect normal          Speech: Speech normal          Behavior: Behavior is cooperative  Note: Portions of this record may have been created with voice recognition software  Occasional wrong word or "sound a like" substitutions may have occurred due to the inherent limitations of voice recognition software  Please read the chart carefully and recognize, using context, where substitutions have occurred  *

## 2022-12-09 NOTE — TELEPHONE ENCOUNTER
Productive cough x 2 wks  Covid negative today at 9:49 am    No other symptoms  Cough getting worse not better  Used Mucinex and Desym with little to no relief  No available appts  Please advise

## 2022-12-20 ENCOUNTER — PATIENT MESSAGE (OUTPATIENT)
Dept: FAMILY MEDICINE CLINIC | Facility: CLINIC | Age: 27
End: 2022-12-20

## 2022-12-20 ENCOUNTER — TELEPHONE (OUTPATIENT)
Dept: FAMILY MEDICINE CLINIC | Facility: CLINIC | Age: 27
End: 2022-12-20

## 2022-12-20 NOTE — TELEPHONE ENCOUNTER
From: Teofilo Carmona  To: Adriana Laurence  Sent: 12/20/2022 9:56 AM EST  Subject: Lingering cough    Hi Dr Jarrod Liu, I hope this finds you well! I called in to the office on 12/9 regarding a cough that I had, I am now going on week 4 of the cough and while it’s improved I still have it  I wasn’t able to be seen in your office that day so I went to [facility] and they prescribed me with an Albuterol inhaler, prednisone and benzonatate  I have finished the prednisone and I’m one away from being out of the benzonatate and I’m wondering if you have any suggestions for next course of action  Thank you!

## 2022-12-20 NOTE — TELEPHONE ENCOUNTER
Since symptoms are improving, recommended continuing to monitor  If cough persists >6 weeks or worsens she should be seen

## 2023-01-03 ENCOUNTER — APPOINTMENT (OUTPATIENT)
Dept: LAB | Facility: MEDICAL CENTER | Age: 28
End: 2023-01-03

## 2023-01-03 ENCOUNTER — OFFICE VISIT (OUTPATIENT)
Dept: FAMILY MEDICINE CLINIC | Facility: CLINIC | Age: 28
End: 2023-01-03

## 2023-01-03 VITALS
HEART RATE: 111 BPM | SYSTOLIC BLOOD PRESSURE: 120 MMHG | BODY MASS INDEX: 35.36 KG/M2 | RESPIRATION RATE: 17 BRPM | WEIGHT: 220 LBS | TEMPERATURE: 98.8 F | HEIGHT: 66 IN | OXYGEN SATURATION: 97 % | DIASTOLIC BLOOD PRESSURE: 80 MMHG

## 2023-01-03 DIAGNOSIS — E55.9 VITAMIN D DEFICIENCY: ICD-10-CM

## 2023-01-03 DIAGNOSIS — T75.3XXA SEVERE MOTION SICKNESS, INITIAL ENCOUNTER: ICD-10-CM

## 2023-01-03 DIAGNOSIS — R11.0 NAUSEA: ICD-10-CM

## 2023-01-03 DIAGNOSIS — D64.9 ANEMIA, UNSPECIFIED TYPE: ICD-10-CM

## 2023-01-03 DIAGNOSIS — E53.8 LOW VITAMIN B12 LEVEL: ICD-10-CM

## 2023-01-03 DIAGNOSIS — D50.9 IRON DEFICIENCY ANEMIA, UNSPECIFIED IRON DEFICIENCY ANEMIA TYPE: ICD-10-CM

## 2023-01-03 DIAGNOSIS — J01.40 ACUTE NON-RECURRENT PANSINUSITIS: Primary | ICD-10-CM

## 2023-01-03 DIAGNOSIS — D84.9 IMMUNOCOMPROMISED STATE (HCC): ICD-10-CM

## 2023-01-03 DIAGNOSIS — D84.9 IMMUNOCOMPROMISED PATIENT (HCC): ICD-10-CM

## 2023-01-03 DIAGNOSIS — R19.7 DIARRHEA, UNSPECIFIED TYPE: ICD-10-CM

## 2023-01-03 DIAGNOSIS — K50.119 CROHN'S DISEASE OF COLON WITH COMPLICATION (HCC): ICD-10-CM

## 2023-01-03 LAB
25(OH)D3 SERPL-MCNC: 21.8 NG/ML (ref 30–100)
ALBUMIN SERPL BCP-MCNC: 3.2 G/DL (ref 3.5–5)
ALP SERPL-CCNC: 84 U/L (ref 46–116)
ALT SERPL W P-5'-P-CCNC: 24 U/L (ref 12–78)
ANION GAP SERPL CALCULATED.3IONS-SCNC: 11 MMOL/L (ref 4–13)
AST SERPL W P-5'-P-CCNC: 17 U/L (ref 5–45)
BASOPHILS # BLD AUTO: 0.09 THOUSANDS/ÂΜL (ref 0–0.1)
BASOPHILS NFR BLD AUTO: 1 % (ref 0–1)
BILIRUB SERPL-MCNC: 0.47 MG/DL (ref 0.2–1)
BUN SERPL-MCNC: 13 MG/DL (ref 5–25)
CALCIUM ALBUM COR SERPL-MCNC: 9.2 MG/DL (ref 8.3–10.1)
CALCIUM SERPL-MCNC: 8.6 MG/DL (ref 8.3–10.1)
CHLORIDE SERPL-SCNC: 110 MMOL/L (ref 96–108)
CO2 SERPL-SCNC: 19 MMOL/L (ref 21–32)
CREAT SERPL-MCNC: 0.77 MG/DL (ref 0.6–1.3)
CRP SERPL QL: 67.7 MG/L
EOSINOPHIL # BLD AUTO: 0.5 THOUSAND/ÂΜL (ref 0–0.61)
EOSINOPHIL NFR BLD AUTO: 5 % (ref 0–6)
ERYTHROCYTE [DISTWIDTH] IN BLOOD BY AUTOMATED COUNT: 12.8 % (ref 11.6–15.1)
FERRITIN SERPL-MCNC: 46 NG/ML (ref 8–388)
GFR SERPL CREATININE-BSD FRML MDRD: 106 ML/MIN/1.73SQ M
GLUCOSE P FAST SERPL-MCNC: 88 MG/DL (ref 65–99)
HBV CORE AB SER QL: NORMAL
HBV CORE IGM SER QL: NORMAL
HBV SURFACE AG SER QL: NORMAL
HCT VFR BLD AUTO: 37.1 % (ref 34.8–46.1)
HCV AB SER QL: NORMAL
HGB BLD-MCNC: 12.3 G/DL (ref 11.5–15.4)
IMM GRANULOCYTES # BLD AUTO: 0.03 THOUSAND/UL (ref 0–0.2)
IMM GRANULOCYTES NFR BLD AUTO: 0 % (ref 0–2)
IRON SATN MFR SERPL: 21 % (ref 15–50)
IRON SERPL-MCNC: 83 UG/DL (ref 50–170)
LYMPHOCYTES # BLD AUTO: 3.84 THOUSANDS/ÂΜL (ref 0.6–4.47)
LYMPHOCYTES NFR BLD AUTO: 37 % (ref 14–44)
MCH RBC QN AUTO: 29 PG (ref 26.8–34.3)
MCHC RBC AUTO-ENTMCNC: 33.2 G/DL (ref 31.4–37.4)
MCV RBC AUTO: 88 FL (ref 82–98)
MONOCYTES # BLD AUTO: 0.64 THOUSAND/ÂΜL (ref 0.17–1.22)
MONOCYTES NFR BLD AUTO: 6 % (ref 4–12)
NEUTROPHILS # BLD AUTO: 5.29 THOUSANDS/ÂΜL (ref 1.85–7.62)
NEUTS SEG NFR BLD AUTO: 51 % (ref 43–75)
NRBC BLD AUTO-RTO: 0 /100 WBCS
PLATELET # BLD AUTO: 273 THOUSANDS/UL (ref 149–390)
PMV BLD AUTO: 10.2 FL (ref 8.9–12.7)
POTASSIUM SERPL-SCNC: 3.8 MMOL/L (ref 3.5–5.3)
PROT SERPL-MCNC: 7.3 G/DL (ref 6.4–8.4)
RBC # BLD AUTO: 4.24 MILLION/UL (ref 3.81–5.12)
SODIUM SERPL-SCNC: 140 MMOL/L (ref 135–147)
TIBC SERPL-MCNC: 395 UG/DL (ref 250–450)
TRANSFERRIN SERPL-MCNC: 311 MG/DL (ref 200–400)
VIT B12 SERPL-MCNC: 596 PG/ML (ref 100–900)
WBC # BLD AUTO: 10.39 THOUSAND/UL (ref 4.31–10.16)

## 2023-01-03 RX ORDER — CEFDINIR 300 MG/1
300 CAPSULE ORAL EVERY 12 HOURS SCHEDULED
Qty: 14 CAPSULE | Refills: 0 | Status: SHIPPED | OUTPATIENT
Start: 2023-01-03 | End: 2023-01-10

## 2023-01-03 RX ORDER — BENZONATATE 100 MG/1
100 CAPSULE ORAL 3 TIMES DAILY PRN
Qty: 20 CAPSULE | Refills: 0 | Status: SHIPPED | OUTPATIENT
Start: 2023-01-03

## 2023-01-03 NOTE — PROGRESS NOTES
Name: Claudette Kearns      : 1995      MRN: [id number]  Encounter Provider: KASSY Spann  Encounter Date: 1/3/2023   Encounter department: [address] [place] [address]  Acute non-recurrent pansinusitis  -     cefdinir (OMNICEF) 300 mg capsule; Take 1 capsule (300 mg total) by mouth every 12 (twelve) hours for 7 days  -     benzonatate (TESSALON PERLES) 100 mg capsule; Take 1 capsule (100 mg total) by mouth 3 (three) times a day as needed for cough  Discussed with patient plan to treat with a 7-day course of Omnicef along with a renewal for the benzonatate for cough  Patient instructed to call if no improvement in 72 hours or symptoms worsen       Subjective      27 y  o female presenting with nasal congestion, dental and facial pain along with a cough for the past 6 weeks  Patient was initially seen on 2022 at the [address] where she was diagnosed with acute bronchitis  At that time she was given a course of steroids, albuterol inhaler and Tessalon Perles  Patient was to follow-up with her PCP in 3 to 5 days if symptoms had not improved  Patient said that she was starting to feel better but then in the last week symptoms seem to have worsened  Patient denies fever, chills, headache, generalized body aches, shortness of breath, chest tightness or GI symptoms  Patient reports her upper teeth hurt along with sinus pressure on forehead and across cheeks  Review of Systems   Constitutional: Negative for chills, fatigue and fever  HENT: Positive for congestion, sinus pressure and sinus pain  Negative for ear pain, postnasal drip, rhinorrhea, sore throat, trouble swallowing and voice change  Respiratory: Positive for cough  Negative for chest tightness, shortness of breath and wheezing  Cardiovascular: Negative for chest pain and palpitations  Gastrointestinal: Negative for abdominal distention, abdominal pain, diarrhea and nausea  Musculoskeletal: Negative for myalgias  Neurological: Negative for dizziness, light-headedness and headaches  Psychiatric/Behavioral: Negative  Current Outpatient Medications on File Prior to Visit   Medication Sig   • albuterol (ProAir HFA) 90 mcg/act inhaler Inhale 2 puffs every 6 (six) hours as needed for wheezing   • celecoxib (CeleBREX) 100 mg capsule Take 1 capsule (100 mg total) by mouth 2 (two) times a day As needed for joint pain, take with food   • drospirenone-ethinyl estradiol (TOTONIEL) 3-0 02 MG per tablet Take 1 tablet by mouth daily   • escitalopram (LEXAPRO) 20 mg tablet Take 1 tablet (20 mg total) by mouth daily   • ferrous sulfate 324 (65 Fe) mg Take 1 tablet (324 mg total) by mouth 2 (two) times a day before meals   • inFLIXimab (Remicade) 100 mg Infuse 449 mg into a venous catheter every 28 days   • ondansetron (ZOFRAN-ODT) 4 mg disintegrating tablet Take 1 tablet (4 mg total) by mouth every 6 (six) hours as needed for nausea or vomiting   • scopolamine (TRANSDERM-SCOP) 1 mg/3 days TD 72 hr patch Place 1 patch on the skin every third day   • sulfaSALAzine (AZULFIDINE) 500 mg tablet Take 1 tab daily for a week, then 1 tab twice a day for a week, then 2 tabs twice a day (Patient not taking: Reported on 1/3/2023)   • [DISCONTINUED] benzonatate (TESSALON) 200 MG capsule Take 1 capsule (200 mg total) by mouth 3 (three) times a day as needed for cough (Patient not taking: Reported on 1/3/2023)       Objective     /80 (BP Location: Left arm, Patient Position: Sitting, Cuff Size: Standard)   Pulse (!) 111   Temp 98 8 °F (37 1 °C)   Resp 17   Ht 5' 6" (1 676 m)   Wt 99 8 kg (220 lb)   LMP 12/25/2022   SpO2 97%   BMI 35 51 kg/m² (Reviewed)    Physical Exam  Vitals reviewed  Constitutional:       General: She is not in acute distress  Appearance: She is well-developed and well-groomed  She is not ill-appearing  HENT:      Head: Normocephalic and atraumatic        Right Ear: Tympanic membrane, ear canal and external ear normal       Left Ear: Tympanic membrane, ear canal and external ear normal       Nose: Nasal tenderness, mucosal edema and congestion present  Right Turbinates: Swollen  Left Turbinates: Swollen  Right Sinus: Maxillary sinus tenderness and frontal sinus tenderness present  Left Sinus: Maxillary sinus tenderness and frontal sinus tenderness present  Mouth/Throat:      Pharynx: No oropharyngeal exudate or posterior oropharyngeal erythema  Eyes:      General: Lids are normal       Extraocular Movements: Extraocular movements intact  Conjunctiva/sclera: Conjunctivae normal       Pupils: Pupils are equal, round, and reactive to light  Neck:      Trachea: Trachea and phonation normal    Cardiovascular:      Rate and Rhythm: Normal rate and regular rhythm  Heart sounds: Normal heart sounds  Pulmonary:      Effort: Pulmonary effort is normal       Breath sounds: Normal breath sounds  Musculoskeletal:      Cervical back: Neck supple  Skin:     General: Skin is warm and dry  Capillary Refill: Capillary refill takes less than 2 seconds  Neurological:      General: No focal deficit present  Mental Status: She is alert and oriented to person, place, and time  Psychiatric:         Mood and Affect: Mood normal          Behavior: Behavior normal  Behavior is cooperative         [address]

## 2023-01-04 LAB
GAMMA INTERFERON BACKGROUND BLD IA-ACNC: 0.06 IU/ML
M TB IFN-G BLD-IMP: NEGATIVE
M TB IFN-G CD4+ BCKGRND COR BLD-ACNC: 0 IU/ML
M TB IFN-G CD4+ BCKGRND COR BLD-ACNC: 0 IU/ML
MITOGEN IGNF BCKGRD COR BLD-ACNC: >10 IU/ML

## 2023-01-10 ENCOUNTER — TELEPHONE (OUTPATIENT)
Dept: FAMILY MEDICINE CLINIC | Facility: CLINIC | Age: 28
End: 2023-01-10

## 2023-01-10 DIAGNOSIS — J01.40 ACUTE NON-RECURRENT PANSINUSITIS: Primary | ICD-10-CM

## 2023-01-10 LAB
INFLIXIMAB AB SERPL-MCNC: 27 NG/ML
INFLIXIMAB SERPL-MCNC: 9.2 UG/ML

## 2023-01-10 RX ORDER — PREDNISONE 10 MG/1
TABLET ORAL
Qty: 26 TABLET | Refills: 0 | Status: SHIPPED | OUTPATIENT
Start: 2023-01-10 | End: 2023-01-24 | Stop reason: ALTCHOICE

## 2023-01-10 NOTE — TELEPHONE ENCOUNTER
Patient saw Rivka Lucero for nasal congestion & facial pain  She completed omnicef & those symptoms improved  Now she started with a  a sore throat since yesterday  Should she be seen again or can something else be called in for her? Please advise      [address]

## 2023-01-10 NOTE — TELEPHONE ENCOUNTER
I spoke with patient, she has a sore throat, cough for the last 7 weeks, stuffy nose for the last 4 weeks, and fatigue  aSscha Luna are not working

## 2023-01-24 ENCOUNTER — HOSPITAL ENCOUNTER (OUTPATIENT)
Dept: RADIOLOGY | Facility: HOSPITAL | Age: 28
Discharge: HOME | End: 2023-01-24

## 2023-01-24 ENCOUNTER — OFFICE VISIT (OUTPATIENT)
Dept: FAMILY MEDICINE CLINIC | Facility: CLINIC | Age: 28
End: 2023-01-24

## 2023-01-24 VITALS
SYSTOLIC BLOOD PRESSURE: 118 MMHG | WEIGHT: 224.5 LBS | RESPIRATION RATE: 17 BRPM | TEMPERATURE: 97.9 F | BODY MASS INDEX: 36.08 KG/M2 | OXYGEN SATURATION: 97 % | HEART RATE: 68 BPM | HEIGHT: 66 IN | DIASTOLIC BLOOD PRESSURE: 74 MMHG

## 2023-01-24 DIAGNOSIS — J32.9 CHRONIC CONGESTION OF PARANASAL SINUS: ICD-10-CM

## 2023-01-24 DIAGNOSIS — R05.3 CHRONIC COUGH: ICD-10-CM

## 2023-01-24 DIAGNOSIS — R05.3 CHRONIC COUGH: Primary | ICD-10-CM

## 2023-01-24 RX ORDER — MONTELUKAST SODIUM 10 MG/1
10 TABLET ORAL
Qty: 30 TABLET | Refills: 2 | Status: SHIPPED | OUTPATIENT
Start: 2023-01-24 | End: 2023-01-25 | Stop reason: SDUPTHER

## 2023-01-24 RX ORDER — MONTELUKAST SODIUM 10 MG/1
10 TABLET ORAL
Qty: 30 TABLET | Refills: 2 | Status: SHIPPED | OUTPATIENT
Start: 2023-01-24 | End: 2023-01-24 | Stop reason: SDUPTHER

## 2023-01-24 NOTE — PROGRESS NOTES
Name: Mylene Andrew      : 1995      MRN: [id number]  Encounter Provider: KASSY Vu  Encounter Date: 2023   Encounter department: [address] [place] [address] St     1  Chronic cough  -     XR chest pa & lateral; Future; Expected date: 2023    2  Chronic congestion of paranasal sinus  -     XR sinuses < 3 vw; Future; Expected date: 2023  -     montelukast (SINGULAIR) 10 mg tablet; Take 1 tablet (10 mg total) by mouth daily at bedtime  #1 Chronic cough  Discussed with patient plan to obtain chest x-ry to further evalaute  #2 chronic congestion of parasinus  Discussed with patient plan to obtain a x-ray of the sinuses to further evaluate  Discussed with patient plan to treat with montelukast at bedtime to decrease bronchospasms  Patient instructed to call if no improvement in 72 hours or symptoms worsen    BMI Counseling: Body mass index is 36 24 kg/m²  The BMI is above normal  Nutrition recommendations include decreasing portion sizes, encouraging healthy choices of fruits and vegetables, consuming healthier snacks, moderation in carbohydrate intake and increasing intake of lean protein  Exercise recommendations include exercising 3-5 times per week and strength training exercises  Rationale for BMI follow-up plan is due to patient being overweight or obese  Subjective      27 y  o female presenting with nasal congestion for the past 9 weeks  She started to develop a sore throat 4 weeks ago and tested negative for COVID for 2 weeks ago  She has been on different antibiotics, and cough medications without much of a relief of symptoms  She uses an inhaler but also does not obtain much relief the cough and congestion at worse at night        Review of Systems   Constitutional: Negative  HENT: Positive for congestion, postnasal drip and sinus pressure  Negative for ear pain, rhinorrhea, sinus pain and sore throat  Respiratory: Positive for cough   Negative for chest tightness, shortness of breath and wheezing  Cardiovascular: Negative  Gastrointestinal: Negative  Musculoskeletal: Negative  Neurological: Negative  Psychiatric/Behavioral: Negative          Current Outpatient Medications on File Prior to Visit   Medication Sig   • albuterol (ProAir HFA) 90 mcg/act inhaler Inhale 2 puffs every 6 (six) hours as needed for wheezing   • celecoxib (CeleBREX) 100 mg capsule Take 1 capsule (100 mg total) by mouth 2 (two) times a day As needed for joint pain, take with food   • drospirenone-ethinyl estradiol (OTTONIEL) 3-0 02 MG per tablet Take 1 tablet by mouth daily   • escitalopram (LEXAPRO) 20 mg tablet Take 1 tablet (20 mg total) by mouth daily   • ferrous sulfate 324 (65 Fe) mg Take 1 tablet (324 mg total) by mouth 2 (two) times a day before meals   • inFLIXimab (Remicade) 100 mg Infuse 449 mg into a venous catheter every 28 days   • ondansetron (ZOFRAN-ODT) 4 mg disintegrating tablet Take 1 tablet (4 mg total) by mouth every 6 (six) hours as needed for nausea or vomiting   • Promethazine-DM (PHENERGAN-DM) 6 25-15 mg/5 mL oral syrup Take 5 mL by mouth every 6 (six) hours as needed for cough   • scopolamine (TRANSDERM-SCOP) 1 mg/3 days TD 72 hr patch Place 1 patch on the skin every third day   • [DISCONTINUED] benzonatate (TESSALON PERLES) 100 mg capsule Take 1 capsule (100 mg total) by mouth 3 (three) times a day as needed for cough   • [DISCONTINUED] predniSONE 10 mg tablet Take 3 tabs twice a day x2 days, then 2 tabs twice a day x2 days, then 1 tab twice a day x2 days, then 1 tablet daily x2 days   • [DISCONTINUED] sulfaSALAzine (AZULFIDINE) 500 mg tablet Take 1 tab daily for a week, then 1 tab twice a day for a week, then 2 tabs twice a day (Patient not taking: Reported on 1/3/2023)       Objective     /74 (BP Location: Left arm, Patient Position: Sitting, Cuff Size: Standard)   Pulse 68   Temp 97 9 °F (36 6 °C) (Temporal)   Resp 17   Ht 5' 6" (1 676 m)   Wt 102 kg (224 lb 8 oz)   LMP 12/25/2022   SpO2 97%   BMI 36 24 kg/m² (Reviewed)    Physical Exam  Vitals reviewed  Constitutional:       General: She is not in acute distress  Appearance: She is well-developed and well-groomed  She is not ill-appearing  HENT:      Head: Normocephalic and atraumatic  Right Ear: Tympanic membrane, ear canal and external ear normal       Left Ear: Tympanic membrane, ear canal and external ear normal       Nose: Congestion present  Mouth/Throat:      Mouth: Mucous membranes are moist       Pharynx: Posterior oropharyngeal erythema present  Eyes:      General: Lids are normal       Extraocular Movements: Extraocular movements intact  Conjunctiva/sclera: Conjunctivae normal       Pupils: Pupils are equal, round, and reactive to light  Neck:      Trachea: Trachea and phonation normal    Cardiovascular:      Rate and Rhythm: Normal rate and regular rhythm  Heart sounds: Normal heart sounds  Pulmonary:      Effort: Pulmonary effort is normal       Breath sounds: Normal breath sounds  Musculoskeletal:      Cervical back: Neck supple  Skin:     General: Skin is warm and dry  Capillary Refill: Capillary refill takes less than 2 seconds  Neurological:      General: No focal deficit present  Mental Status: She is alert and oriented to person, place, and time  Psychiatric:         Mood and Affect: Mood normal          Behavior: Behavior normal  Behavior is cooperative         [address]

## 2023-01-25 ENCOUNTER — TELEPHONE (OUTPATIENT)
Dept: FAMILY MEDICINE CLINIC | Facility: CLINIC | Age: 28
End: 2023-01-25

## 2023-01-25 DIAGNOSIS — J32.9 CHRONIC CONGESTION OF PARANASAL SINUS: ICD-10-CM

## 2023-01-25 RX ORDER — MONTELUKAST SODIUM 10 MG/1
10 TABLET ORAL
Qty: 30 TABLET | Refills: 2 | Status: SHIPPED | OUTPATIENT
Start: 2023-01-25 | End: 2023-01-26

## 2023-01-25 NOTE — TELEPHONE ENCOUNTER
Patient RX ordered yesterday for singulair was not received by [facility]   She is requesting RX be resent to [facility] this morning if possible

## 2023-01-25 NOTE — TELEPHONE ENCOUNTER
Left detailed message on patient voicemail informing patient that singulair was resent to [facility]

## 2023-01-26 DIAGNOSIS — J32.9 CHRONIC CONGESTION OF PARANASAL SINUS: ICD-10-CM

## 2023-01-26 RX ORDER — MONTELUKAST SODIUM 10 MG/1
10 TABLET ORAL
Qty: 90 TABLET | Refills: 2 | Status: SHIPPED | OUTPATIENT
Start: 2023-01-26 | End: 2023-05-09 | Stop reason: SDUPTHER

## 2023-01-27 DIAGNOSIS — J01.01 ACUTE RECURRENT MAXILLARY SINUSITIS: Primary | ICD-10-CM

## 2023-01-27 RX ORDER — LEVOFLOXACIN 500 MG/1
500 TABLET, FILM COATED ORAL EVERY 24 HOURS
Qty: 7 TABLET | Refills: 0 | Status: SHIPPED | OUTPATIENT
Start: 2023-01-27 | End: 2023-02-03

## 2023-01-31 DIAGNOSIS — J32.9 CHRONIC SINUSITIS, UNSPECIFIED LOCATION: Primary | ICD-10-CM

## 2023-01-31 DIAGNOSIS — D84.9 IMMUNOCOMPROMISED PATIENT (HCC): ICD-10-CM

## 2023-01-31 DIAGNOSIS — K50.119 CROHN'S DISEASE OF COLON WITH COMPLICATION (HCC): ICD-10-CM

## 2023-02-17 DIAGNOSIS — T75.3XXA SEVERE MOTION SICKNESS, INITIAL ENCOUNTER: ICD-10-CM

## 2023-02-20 RX ORDER — ONDANSETRON 4 MG/1
4 TABLET, ORALLY DISINTEGRATING ORAL EVERY 6 HOURS PRN
Qty: 20 TABLET | Refills: 0 | Status: SHIPPED | OUTPATIENT
Start: 2023-02-20

## 2023-02-20 RX ORDER — SCOLOPAMINE TRANSDERMAL SYSTEM 1 MG/1
1 PATCH, EXTENDED RELEASE TRANSDERMAL
Qty: 10 PATCH | Refills: 0 | Status: SHIPPED | OUTPATIENT
Start: 2023-02-20

## 2023-03-18 ENCOUNTER — NURSE TRIAGE (OUTPATIENT)
Dept: OTHER | Facility: OTHER | Age: 28
End: 2023-03-18

## 2023-03-18 DIAGNOSIS — U07.1 COVID-19: Primary | ICD-10-CM

## 2023-03-18 RX ORDER — NIRMATRELVIR AND RITONAVIR 300-100 MG
3 KIT ORAL 2 TIMES DAILY
Qty: 30 TABLET | Refills: 0 | Status: SHIPPED | OUTPATIENT
Start: 2023-03-18 | End: 2023-03-23

## 2023-03-18 NOTE — PROGRESS NOTES
Patient started with symptoms on 3/16/2023 and tested positive for covid  at home 3/17/2023  She has chrons and considered high risk for progression of disease and patient is interested in taking paxlovid  Sent over paxlovid for her today 3/18/2023  To f/u with pcp on Monday    Dr Triny Mcgee

## 2023-03-18 NOTE — TELEPHONE ENCOUNTER
Regarding: covid positive concern  ----- Message from Cathryn Im sent at 3/18/2023  9:15 AM EDT -----  " I tested positive for covid and im wondering if I can be started on plaxovid "

## 2023-03-18 NOTE — TELEPHONE ENCOUNTER
Patient tested positive for Covid yesterday with home test  Patient c/o dry cough, congestion, body aches, and headache that started Thursday night  Patient denies fever and difficulty breathing  Patient vaccinated and boostered  Patient has Crohn’s disease and requesting paxlovid at this time  TC sent to on call provider  Provider calling in paxlovid for patient  Patient made aware to set up virtual appointment Monday with provider  Patient verbalized understanding, no further questions  [facility] [place] [address] , [place] - [phone]  Reason for Disposition  • HIGH RISK for severe COVID complications (e g , weak immune system, age > 59 years, obesity with BMI > 25, pregnant, chronic lung disease or other chronic medical condition)  (Exception: Already seen by PCP and no new or worsening symptoms )    Answer Assessment - Initial Assessment Questions  1  COVID-19 DIAGNOSIS: "Who made your COVID-19 diagnosis?" "Was it confirmed by a positive lab test or self-test?" If not diagnosed by a doctor (or NP/PA), ask "Are there lots of cases (community spread) where you live?" Note: See public health department website, if unsure  Positive home test 3/17    2  COVID-19 EXPOSURE: "Was there any known exposure to COVID before the symptoms began?" CDC Definition of close contact: within 6 feet (2 meters) for a total of 15 minutes or more over a 24-hour period  Unknown    3  ONSET: "When did the COVID-19 symptoms start?"      3/16    4  WORST SYMPTOM: "What is your worst symptom?" (e g , cough, fever, shortness of breath, muscle aches)      Congestion    5  COUGH: "Do you have a cough?" If Yes, ask: "How bad is the cough?"        Dry cough    6  FEVER: "Do you have a fever?" If Yes, ask: "What is your temperature, how was it measured, and when did it start?"      Denies    7  RESPIRATORY STATUS: "Describe your breathing?" (e g , shortness of breath, wheezing, unable to speak)       Denies    8  BETTER-SAME-WORSE: "Are you getting better, staying the same or getting worse compared to yesterday?"  If getting worse, ask, "In what way?"      Same    9  HIGH RISK DISEASE: "Do you have any chronic medical problems?" (e g , asthma, heart or lung disease, weak immune system, obesity, etc )      Chron's   10  VACCINE: "Have you had the COVID-19 vaccine?" If Yes, ask: "Which one, how many shots, when did you get it?"        2 vaccines    11  BOOSTER: "Have you received your COVID-19 booster?" If Yes, ask: "Which one and when did you get it?"        2 booster    12  PREGNANCY: "Is there any chance you are pregnant?" "When was your last menstrual period?"        Denies    13  OTHER SYMPTOMS: "Do you have any other symptoms?"  (e g , chills, fatigue, headache, loss of smell or taste, muscle pain, sore throat)        Congestion, HA, body aches    14   O2 SATURATION MONITOR:  "Do you use an oxygen saturation monitor (pulse oximeter) at home?" If Yes, ask "What is your reading (oxygen level) today?" "What is your usual oxygen saturation reading?" (e g , 95%)    Protocols used: CORONAVIRUS (COVID-19) DIAGNOSED OR SUSPECTED-ADULT-

## 2023-03-29 DIAGNOSIS — F41.9 ANXIETY: ICD-10-CM

## 2023-03-29 RX ORDER — ESCITALOPRAM OXALATE 20 MG/1
20 TABLET ORAL DAILY
Qty: 90 TABLET | Refills: 0 | Status: SHIPPED | OUTPATIENT
Start: 2023-03-29

## 2023-05-09 DIAGNOSIS — J32.9 CHRONIC CONGESTION OF PARANASAL SINUS: ICD-10-CM

## 2023-05-09 RX ORDER — MONTELUKAST SODIUM 10 MG/1
10 TABLET ORAL
Qty: 90 TABLET | Refills: 0 | Status: SHIPPED | OUTPATIENT
Start: 2023-05-09

## 2023-05-11 ENCOUNTER — OFFICE VISIT (OUTPATIENT)
Dept: URGENT CARE | Facility: MEDICAL CENTER | Age: 28
End: 2023-05-11

## 2023-05-11 VITALS
HEART RATE: 99 BPM | DIASTOLIC BLOOD PRESSURE: 120 MMHG | TEMPERATURE: 98.5 F | BODY MASS INDEX: 35.36 KG/M2 | OXYGEN SATURATION: 97 % | HEIGHT: 66 IN | WEIGHT: 220 LBS | RESPIRATION RATE: 18 BRPM | SYSTOLIC BLOOD PRESSURE: 168 MMHG

## 2023-05-11 DIAGNOSIS — J45.21 MILD INTERMITTENT ASTHMA WITH ACUTE EXACERBATION: Primary | ICD-10-CM

## 2023-05-11 RX ORDER — ALBUTEROL SULFATE 90 UG/1
2 AEROSOL, METERED RESPIRATORY (INHALATION) EVERY 4 HOURS PRN
Qty: 18 G | Refills: 0 | Status: SHIPPED | OUTPATIENT
Start: 2023-05-11 | End: 2023-05-19

## 2023-05-11 RX ORDER — PREDNISONE 20 MG/1
20 TABLET ORAL 2 TIMES DAILY WITH MEALS
Qty: 10 TABLET | Refills: 0 | Status: SHIPPED | OUTPATIENT
Start: 2023-05-11 | End: 2023-05-16

## 2023-05-11 NOTE — PROGRESS NOTES
"  [facility]        NAME: Alan Carolina is a 33 y o  female  : 1995    MRN: [id number]  DATE: May 11, 2023  TIME: 6:39 PM    Assessment and Plan   Mild intermittent asthma with acute exacerbation [J45 21]  1  Mild intermittent asthma with acute exacerbation  predniSONE 20 mg tablet    albuterol (Ventolin HFA) 90 mcg/act inhaler            Patient Instructions     1  Take Prednisone 20mg  Twice daily x 5 days  2  Use ventolin 2 puffs every 4-6 hours until cough free  3  Limit physical activity until cough free  4  Follow up with PCP in 3-5 days for follow-up  Chief Complaint     Chief Complaint   Patient presents with   • Cough     Started Monday with cough, has rattling in chest when coughing  Has been taking Claritin  History of Present Illness       Peace Klein a 16-year-old female who presents with a 3-day history of increasing cough, chest tightness and wheezing  Patient reports her symptoms started initially with seasonal allergies but the cough is worsened over the past 2 days  She has no prior history of asthma but had a history of \"bronchitis\" in the fall which lasted 11 weeks  Review of Systems   Review of Systems   Constitutional: Negative  HENT: Positive for congestion  Respiratory: Positive for cough and wheezing  Cardiovascular: Negative  Gastrointestinal: Negative            Current Medications       Current Outpatient Medications:   •  albuterol (ProAir HFA) 90 mcg/act inhaler, Inhale 2 puffs every 6 (six) hours as needed for wheezing, Disp: 8 5 g, Rfl: 0  •  albuterol (Ventolin HFA) 90 mcg/act inhaler, Inhale 2 puffs every 4 (four) hours as needed for wheezing for up to 7 days, Disp: 18 g, Rfl: 0  •  celecoxib (CeleBREX) 100 mg capsule, Take 1 capsule (100 mg total) by mouth 2 (two) times a day As needed for joint pain, take with food, Disp: 60 capsule, Rfl: 6  •  drospirenone-ethinyl estradiol (OTTONIEL) 3-0 02 MG per tablet, Take 1 tablet by mouth daily, " "Disp: 84 tablet, Rfl: 4  •  escitalopram (LEXAPRO) 20 mg tablet, Take 1 tablet (20 mg total) by mouth daily, Disp: 90 tablet, Rfl: 0  •  ferrous sulfate 324 (65 Fe) mg, Take 1 tablet (324 mg total) by mouth 2 (two) times a day before meals, Disp: 60 tablet, Rfl: 3  •  inFLIXimab (Remicade) 100 mg, Infuse 449 mg into a venous catheter every 28 days, Disp: 5 each, Rfl: 6  •  montelukast (SINGULAIR) 10 mg tablet, Take 1 tablet (10 mg total) by mouth daily at bedtime, Disp: 90 tablet, Rfl: 0  •  ondansetron (ZOFRAN-ODT) 4 mg disintegrating tablet, Take 1 tablet (4 mg total) by mouth every 6 (six) hours as needed for nausea or vomiting, Disp: 20 tablet, Rfl: 0  •  predniSONE 20 mg tablet, Take 1 tablet (20 mg total) by mouth 2 (two) times a day with meals for 5 days, Disp: 10 tablet, Rfl: 0  •  Promethazine-DM (PHENERGAN-DM) 6 25-15 mg/5 mL oral syrup, Take 5 mL by mouth every 6 (six) hours as needed for cough, Disp: 118 mL, Rfl: 0  •  scopolamine (TRANSDERM-SCOP) 1 mg/3 days TD 72 hr patch, Place 1 patch on the skin over 72 hours every third day, Disp: 10 patch, Rfl: 0    Current Allergies     Allergies as of 05/11/2023 - Reviewed 05/11/2023   Allergen Reaction Noted   • Penicillins Rash 03/26/2019            The following portions of the patient's history were reviewed and updated as appropriate: allergies, current medications, past family history, past medical history, past social history, past surgical history and problem list      Past Medical History:   Diagnosis Date   • Crohn's colitis ([facility] [address] )    • Crohn's disease ([facility] [address] )        Past Surgical History:   Procedure Laterality Date   • COLONOSCOPY         Family History   Problem Relation Age of Onset   • Breast cancer Maternal Grandmother          Medications have been verified  Objective   BP (!) 168/120   Pulse 99   Temp 98 5 °F (36 9 °C) (Temporal)   Resp 18   Ht 5' 6\" (1 676 m)   Wt 99 8 kg (220 lb)   SpO2 97%   BMI 35 51 kg/m²   No LMP recorded       " Physical Exam     Physical Exam  Constitutional:       General: She is not in acute distress  Appearance: Normal appearance  She is not ill-appearing  HENT:      Head: Normocephalic and atraumatic  Right Ear: Tympanic membrane and ear canal normal       Left Ear: Tympanic membrane and ear canal normal       Nose: Mucosal edema and congestion present  No rhinorrhea  Mouth/Throat:      Lips: Pink  Pharynx: Oropharynx is clear  Cardiovascular:      Rate and Rhythm: Normal rate and regular rhythm  Heart sounds: Normal heart sounds, S1 normal and S2 normal  No murmur heard  Pulmonary:      Effort: Pulmonary effort is normal       Breath sounds: Examination of the right-middle field reveals wheezing  Examination of the left-middle field reveals wheezing  Examination of the right-lower field reveals wheezing  Examination of the left-lower field reveals wheezing  Wheezing present  Neurological:      Mental Status: She is alert  Statement Selected

## 2023-05-11 NOTE — PATIENT INSTRUCTIONS
1  Take Prednisone 20mg  Twice daily x 5 days  2  Use ventolin 2 puffs every 4-6 hours until cough free  3  Limit physical activity until cough free  4  Follow up with PCP in 3-5 days for follow-up

## 2023-05-11 NOTE — LETTER
May 11, 2023     Patient: Cheko Acosta   YOB: 1995   Date of Visit: 5/11/2023       To Whom It May Concern: It is my medical opinion that Bee Peres may return to work on 5/12/23  If you have any questions or concerns, please don't hesitate to call           Sincerely,        Chana Monroy PA-C    CC: No Recipients

## 2023-05-19 ENCOUNTER — OFFICE VISIT (OUTPATIENT)
Dept: FAMILY MEDICINE CLINIC | Facility: CLINIC | Age: 28
End: 2023-05-19

## 2023-05-19 VITALS
HEART RATE: 78 BPM | SYSTOLIC BLOOD PRESSURE: 124 MMHG | WEIGHT: 220 LBS | DIASTOLIC BLOOD PRESSURE: 84 MMHG | OXYGEN SATURATION: 97 % | RESPIRATION RATE: 17 BRPM | TEMPERATURE: 98.4 F | HEIGHT: 66 IN | BODY MASS INDEX: 35.36 KG/M2

## 2023-05-19 DIAGNOSIS — F41.9 ANXIETY: ICD-10-CM

## 2023-05-19 DIAGNOSIS — R05.8 ALLERGIC COUGH: ICD-10-CM

## 2023-05-19 DIAGNOSIS — R05.2 SUBACUTE COUGH: Primary | ICD-10-CM

## 2023-05-19 RX ORDER — ESCITALOPRAM OXALATE 20 MG/1
20 TABLET ORAL DAILY
Qty: 90 TABLET | Refills: 0 | Status: SHIPPED | OUTPATIENT
Start: 2023-05-19

## 2023-05-19 NOTE — PROGRESS NOTES
FAMILY MEDICINE PROGRESS NOTE    Date of Service: 23  Primary Care Provider:   Shalom Tinoco MD       Name: Jaqueline Membreno       : 1995       Age:28 y o  Sex: female      MRN: [id number]      Chief Complaint:Follow-up (Urgent care, [facility] 2023)       ASSESSMENT and PLAN:  Jaqueline Membreno is a 33 y o  female with:     Problem List Items Addressed This Visit        Other    Anxiety    Relevant Medications    escitalopram (LEXAPRO) 20 mg tablet   Other Visit Diagnoses     Subacute cough    -  Primary    Allergic cough            Cough improved, recommended continuing Xyzal and Singulair  Consider addition of flonase since post nasal drip might be contributing  No history of asthma, but if recurrence of persistent symptoms then would consider obtaining PFTs  SUBJECTIVE:  Jaqueline Membreno is a 33 y o  female who presents today with a chief complaint of Follow-up (Urgent care, [facility] 2023)  HPI     Patient was seen at urgent care on May 11 with cough  She was treated for bronchitis with prednisone and albuterol inhaler since wheezing was noted on exam  Today she reports that her symptoms improved a few days ago  She started taking Xyzal for allergies as well which has been more helpful than Claritin  Does not feel albuterol was helpful  She has been on Singulair since January  She has similar cough that lingered in December that was attributed to sinus infections, she was treated with antibiotics  No history of asthma, atopy, eczema, food allergies  Review of Systems   Constitutional: Negative for chills and fever  HENT: Negative for congestion, postnasal drip and rhinorrhea  Respiratory: Positive for cough (improved)  Negative for shortness of breath and wheezing  I have reviewed the patient's Past Medical History      Current Outpatient Medications:   •  albuterol (Ventolin HFA) 90 mcg/act inhaler, Inhale 2 puffs every 4 "(four) hours as needed for wheezing for up to 7 days, Disp: 18 g, Rfl: 0  •  celecoxib (CeleBREX) 100 mg capsule, Take 1 capsule (100 mg total) by mouth 2 (two) times a day As needed for joint pain, take with food, Disp: 60 capsule, Rfl: 6  •  drospirenone-ethinyl estradiol (OTTONIEL) 3-0 02 MG per tablet, Take 1 tablet by mouth daily, Disp: 84 tablet, Rfl: 4  •  escitalopram (LEXAPRO) 20 mg tablet, Take 1 tablet (20 mg total) by mouth daily, Disp: 90 tablet, Rfl: 0  •  ferrous sulfate 324 (65 Fe) mg, Take 1 tablet (324 mg total) by mouth 2 (two) times a day before meals, Disp: 60 tablet, Rfl: 3  •  inFLIXimab (Remicade) 100 mg, Infuse 449 mg into a venous catheter every 28 days, Disp: 5 each, Rfl: 6  •  montelukast (SINGULAIR) 10 mg tablet, Take 1 tablet (10 mg total) by mouth daily at bedtime, Disp: 90 tablet, Rfl: 0  •  ondansetron (ZOFRAN-ODT) 4 mg disintegrating tablet, Take 1 tablet (4 mg total) by mouth every 6 (six) hours as needed for nausea or vomiting, Disp: 20 tablet, Rfl: 0  •  Promethazine-DM (PHENERGAN-DM) 6 25-15 mg/5 mL oral syrup, Take 5 mL by mouth every 6 (six) hours as needed for cough, Disp: 118 mL, Rfl: 0  •  scopolamine (TRANSDERM-SCOP) 1 mg/3 days TD 72 hr patch, Place 1 patch on the skin over 72 hours every third day, Disp: 10 patch, Rfl: 0    OBJECTIVE:  /84 (BP Location: Right arm, Patient Position: Sitting, Cuff Size: Standard)   Pulse 78   Temp 98 4 °F (36 9 °C) (Tympanic)   Resp 17   Ht 5' 6\" (1 676 m)   Wt 99 8 kg (220 lb)   LMP 05/19/2023 (Exact Date)   SpO2 97%   BMI 35 51 kg/m²    BP Readings from Last 3 Encounters:   05/19/23 124/84   05/11/23 (!) 168/120   01/24/23 118/74      Wt Readings from Last 3 Encounters:   05/19/23 99 8 kg (220 lb)   05/11/23 99 8 kg (220 lb)   01/24/23 102 kg (224 lb 8 oz)      Physical Exam  Constitutional:       General: She is not in acute distress  Appearance: Normal appearance  She is normal weight   She is not ill-appearing or " "toxic-appearing  HENT:      Head: Normocephalic and atraumatic  Right Ear: External ear normal       Left Ear: External ear normal       Nose: Nose normal       Mouth/Throat:      Mouth: Mucous membranes are moist    Eyes:      Extraocular Movements: Extraocular movements intact  Conjunctiva/sclera: Conjunctivae normal    Pulmonary:      Effort: Pulmonary effort is normal  No respiratory distress  Breath sounds: Normal breath sounds  No stridor  No wheezing, rhonchi or rales  Musculoskeletal:         General: Normal range of motion  Cervical back: Normal range of motion and neck supple  Skin:     Findings: No erythema or rash  Neurological:      General: No focal deficit present  Mental Status: She is alert and oriented to person, place, and time  Psychiatric:         Mood and Affect: Mood normal          Behavior: Behavior normal                 Fabián Hodgson MD    Note: Portions of the record have been created with voice recognition software  Occasional wrong word or \"sound a like\" substitutions may have occurred due to the inherent limitations of voice recognition software  Read the chart carefully and recognize, using context, where substitutions have occurred    "

## 2023-06-17 DIAGNOSIS — T75.3XXA SEVERE MOTION SICKNESS, INITIAL ENCOUNTER: ICD-10-CM

## 2023-06-19 ENCOUNTER — OFFICE VISIT (OUTPATIENT)
Dept: URGENT CARE | Facility: CLINIC | Age: 28
End: 2023-06-19
Payer: COMMERCIAL

## 2023-06-19 VITALS
RESPIRATION RATE: 16 BRPM | HEART RATE: 100 BPM | SYSTOLIC BLOOD PRESSURE: 133 MMHG | TEMPERATURE: 97.8 F | DIASTOLIC BLOOD PRESSURE: 81 MMHG

## 2023-06-19 DIAGNOSIS — L73.8 HOT TUB FOLLICULITIS: Primary | ICD-10-CM

## 2023-06-19 PROCEDURE — 99213 OFFICE O/P EST LOW 20 MIN: CPT | Performed by: NURSE PRACTITIONER

## 2023-06-19 RX ORDER — SCOLOPAMINE TRANSDERMAL SYSTEM 1 MG/1
1 PATCH, EXTENDED RELEASE TRANSDERMAL
Qty: 10 PATCH | Refills: 0 | Status: SHIPPED | OUTPATIENT
Start: 2023-06-19

## 2023-06-19 RX ORDER — CIPROFLOXACIN 500 MG/1
500 TABLET, FILM COATED ORAL EVERY 12 HOURS SCHEDULED
Qty: 14 TABLET | Refills: 0 | Status: SHIPPED | OUTPATIENT
Start: 2023-06-19 | End: 2023-06-26

## 2023-06-19 RX ORDER — ONDANSETRON 4 MG/1
4 TABLET, ORALLY DISINTEGRATING ORAL EVERY 6 HOURS PRN
Qty: 20 TABLET | Refills: 0 | Status: SHIPPED | OUTPATIENT
Start: 2023-06-19

## 2023-06-19 NOTE — PATIENT INSTRUCTIONS
Ciprofloxacin twice a day for 7 days   Keep area clean and dry   Cool compresses for itching   Avoid using the hot tub until it has been properly treated and tested  Acute Rash   WHAT YOU NEED TO KNOW:   A rash is irritated, red, or itchy skin or mucus membranes, such as the lining of your nose or throat  Acute means the rash starts suddenly, worsens quickly, and lasts a short time  Common causes include a disease or infection, a reaction to something you are allergic to, or certain medicines  DISCHARGE INSTRUCTIONS:   Return to the emergency department if:   You have sudden trouble breathing or chest pain  You are vomiting, have a headache or muscle aches, and your throat hurts  Call your doctor or dermatologist if:   You have a fever  You get open wounds from scratching your skin, or you have a wound that is red, swollen, or painful  Your rash lasts longer than 3 months  You have swelling or pain in your joints  You have questions or concerns about your condition or care  Medicines:  If your rash does not go away on its own, you may need the following medicines:  Antihistamines  may be given to help decrease itching  Steroids  may be given to decrease inflammation  Antibiotics  help fight or prevent a bacterial infection  Take your medicine as directed  Contact your healthcare provider if you think your medicine is not helping or if you have side effects  Tell your provider if you are allergic to any medicine  Keep a list of the medicines, vitamins, and herbs you take  Include the amounts, and when and why you take them  Bring the list or the pill bottles to follow-up visits  Carry your medicine list with you in case of an emergency  Prevent a rash or care for your skin when you have a rash:  Dry skin can lead to more problems  Do not scratch your skin if it itches  You may cause a skin infection by scratching   The following may prevent dry skin, and help your skin look better:  Help soothe your rash  Apply thick cream lotions or petroleum jelly  Cool compresses may also soothe your skin  Apply a cool compress or a cool, wet towel, and then cover it with a dry towel  Use lukewarm water when you bathe  Hot water may damage your skin more  Pat your skin dry  Do not rub your skin with a towel  Use detergents, soaps, shampoos, and bubble baths  made for sensitive skin  Wear clothes made of cotton instead of nylon or wool  Cotton is softer, so it will not hurt your skin as much  Follow up with your healthcare providers as directed:  A dermatologist may help find the cause of your rash or help plan or change treatment  A dietitian may help with meal planning if you have a food allergy  Write down your questions so you remember to ask them during your visits  © Copyright [employer] 2022 Information is for End User's use only and may not be sold, redistributed or otherwise used for commercial purposes  The above information is an  only  It is not intended as medical advice for individual conditions or treatments  Talk to your doctor, nurse or pharmacist before following any medical regimen to see if it is safe and effective for you

## 2023-06-19 NOTE — PROGRESS NOTES
[facility]        NAME: Tank Houser is a 33 y o  female  : 1995    MRN: [id number]  DATE: 2023  TIME: 9:56 AM    Assessment and Plan   Hot tub folliculitis [I90 7]  1  Hot tub folliculitis  ciprofloxacin (CIPRO) 500 mg tablet            Patient Instructions   Ciprofloxacin twice a day for 7 days   Keep area clean and dry   Cool compresses for itching   Avoid using the hot tub until it has been properly treated and tested  Follow up with PCP in 3-5 days  Proceed to  ER if symptoms worsen  Chief Complaint     Chief Complaint   Patient presents with   • Rash     Started yesterday with rash on abdomen, back and legs  Has gotten much worse  Itching and painful Only thing new was a bathing suit she wore 3 days ago without washing  History of Present Illness       Patient is a 60-year-old female presenting with rash to her abdomen, flanks, and low back  Rash started yesterday morning and has rapidly progressed  She states it is itchy and painful  Denies fever, chills, shortness of breath, or cough  She did wear a new bathing suit on Friday that she had not yet washed  She wore the bathing suit and a friend's hot tub  Denies use of any new medications, soaps, lotions, or other hygiene products  She has been applying Benadryl cream and taking Benadryl tablets without improvement  Review of Systems   Review of Systems   Constitutional: Negative for activity change, chills and fever  Skin: Positive for rash           Current Medications       Current Outpatient Medications:   •  ciprofloxacin (CIPRO) 500 mg tablet, Take 1 tablet (500 mg total) by mouth every 12 (twelve) hours for 7 days, Disp: 14 tablet, Rfl: 0  •  drospirenone-ethinyl estradiol (OTTONIEL) 3-0 02 MG per tablet, Take 1 tablet by mouth daily, Disp: 84 tablet, Rfl: 4  •  escitalopram (LEXAPRO) 20 mg tablet, Take 1 tablet (20 mg total) by mouth daily, Disp: 90 tablet, Rfl: 0  •  ferrous sulfate 324 (65 Fe) mg, Take 1 tablet (324 mg total) by mouth 2 (two) times a day before meals, Disp: 60 tablet, Rfl: 3  •  inFLIXimab (Remicade) 100 mg, Infuse 449 mg into a venous catheter every 28 days, Disp: 5 each, Rfl: 6  •  montelukast (SINGULAIR) 10 mg tablet, Take 1 tablet (10 mg total) by mouth daily at bedtime, Disp: 90 tablet, Rfl: 0  •  ondansetron (ZOFRAN-ODT) 4 mg disintegrating tablet, Take 1 tablet (4 mg total) by mouth every 6 (six) hours as needed for nausea or vomiting, Disp: 20 tablet, Rfl: 0  •  scopolamine (TRANSDERM-SCOP) 1 mg/3 days TD 72 hr patch, Place 1 patch on the skin over 72 hours every third day, Disp: 10 patch, Rfl: 0  •  celecoxib (CeleBREX) 100 mg capsule, Take 1 capsule (100 mg total) by mouth 2 (two) times a day As needed for joint pain, take with food (Patient not taking: Reported on 6/19/2023), Disp: 60 capsule, Rfl: 6  •  Promethazine-DM (PHENERGAN-DM) 6 25-15 mg/5 mL oral syrup, Take 5 mL by mouth every 6 (six) hours as needed for cough (Patient not taking: Reported on 6/19/2023), Disp: 118 mL, Rfl: 0    Current Allergies     Allergies as of 06/19/2023 - Reviewed 06/19/2023   Allergen Reaction Noted   • Penicillins Rash 03/26/2019            The following portions of the patient's history were reviewed and updated as appropriate: allergies, current medications, past family history, past medical history, past social history, past surgical history and problem list      Past Medical History:   Diagnosis Date   • Crohn's colitis ([facility] [address] )    • Crohn's disease ([facility] [address] )        Past Surgical History:   Procedure Laterality Date   • COLONOSCOPY         Family History   Problem Relation Age of Onset   • Breast cancer Maternal Grandmother          Medications have been verified  Objective   /81   Pulse 100   Temp 97 8 °F (36 6 °C) (Temporal)   Resp 16        Physical Exam     Physical Exam  Vitals reviewed  Constitutional:       General: She is awake  She is not in acute distress       Appearance: Normal appearance  Cardiovascular:      Rate and Rhythm: Normal rate  Pulmonary:      Effort: Pulmonary effort is normal    Skin:     General: Skin is warm and moist       Comments: Multiple erythematous papules and pustules to the abdomen  Pustules are intact  Neurological:      Mental Status: She is alert  Psychiatric:         Behavior: Behavior is cooperative

## 2023-07-28 ENCOUNTER — OFFICE VISIT (OUTPATIENT)
Dept: FAMILY MEDICINE CLINIC | Facility: CLINIC | Age: 28
End: 2023-07-28
Payer: COMMERCIAL

## 2023-07-28 ENCOUNTER — TELEPHONE (OUTPATIENT)
Dept: FAMILY MEDICINE CLINIC | Facility: CLINIC | Age: 28
End: 2023-07-28

## 2023-07-28 VITALS
OXYGEN SATURATION: 98 % | HEIGHT: 66 IN | HEART RATE: 97 BPM | TEMPERATURE: 97.8 F | WEIGHT: 229.8 LBS | DIASTOLIC BLOOD PRESSURE: 88 MMHG | BODY MASS INDEX: 36.93 KG/M2 | SYSTOLIC BLOOD PRESSURE: 128 MMHG

## 2023-07-28 DIAGNOSIS — F41.9 ANXIETY: ICD-10-CM

## 2023-07-28 DIAGNOSIS — T75.3XXA SEVERE MOTION SICKNESS, INITIAL ENCOUNTER: ICD-10-CM

## 2023-07-28 DIAGNOSIS — Z00.00 ANNUAL PHYSICAL EXAM: Primary | ICD-10-CM

## 2023-07-28 DIAGNOSIS — D50.9 IRON DEFICIENCY ANEMIA, UNSPECIFIED IRON DEFICIENCY ANEMIA TYPE: ICD-10-CM

## 2023-07-28 DIAGNOSIS — R53.83 OTHER FATIGUE: ICD-10-CM

## 2023-07-28 DIAGNOSIS — K50.119 CROHN'S DISEASE OF COLON WITH COMPLICATION (HCC): ICD-10-CM

## 2023-07-28 PROCEDURE — 99395 PREV VISIT EST AGE 18-39: CPT | Performed by: FAMILY MEDICINE

## 2023-07-28 PROCEDURE — 99214 OFFICE O/P EST MOD 30 MIN: CPT | Performed by: FAMILY MEDICINE

## 2023-07-28 PROCEDURE — 3725F SCREEN DEPRESSION PERFORMED: CPT | Performed by: FAMILY MEDICINE

## 2023-07-28 RX ORDER — SCOLOPAMINE TRANSDERMAL SYSTEM 1 MG/1
1 PATCH, EXTENDED RELEASE TRANSDERMAL
Qty: 10 PATCH | Refills: 3 | Status: SHIPPED | OUTPATIENT
Start: 2023-07-28

## 2023-07-28 RX ORDER — DESVENLAFAXINE 100 MG/1
100 TABLET, EXTENDED RELEASE ORAL DAILY
Qty: 30 TABLET | Refills: 3 | Status: SHIPPED | OUTPATIENT
Start: 2023-07-28

## 2023-07-28 RX ORDER — ONDANSETRON 4 MG/1
4 TABLET, ORALLY DISINTEGRATING ORAL EVERY 6 HOURS PRN
Qty: 20 TABLET | Refills: 3 | Status: SHIPPED | OUTPATIENT
Start: 2023-07-28

## 2023-07-28 NOTE — PROGRESS NOTES
[facility]    NAME: Mariaa Renee  AGE: 33 y.o. SEX: female  : 1995     DATE: 2023     Assessment and Plan:     Problem List Items Addressed This Visit    None      Immunizations and preventive care screenings were discussed with patient today. Appropriate education was printed on patient's after visit summary. Counseling:  · Exercise: the importance of regular exercise/physical activity was discussed. Recommend exercise 3-5 times per week for at least 30 minutes. No follow-ups on file. Chief Complaint:     Chief Complaint   Patient presents with   • Establish Care   • Physical Exam      History of Present Illness:     Adult Annual Physical   Patient here for a comprehensive physical exam and to get established into the practice. . The patient reports needing follow-up for iron deficiency anemia, anxiety, Crohn's disease and refill of meds for motion sickness. She states her Crohn's is well controlled at this time. Her anxiety is not well controlled. She complains of anxiety, mood swings and increased stress. She feels that the anxiety is not doing well on Lexapro and would like to change meds. She denies any suicidality. Diet and Physical Activity  · Diet/Nutrition: well balanced diet. · Exercise: walking. Depression Screening  PHQ-2/9 Depression Screening         General Health  · Sleep: sleeps well. · Hearing: normal - bilateral.  · Vision: no vision problems. · Dental: regular dental visits. /GYN Health  · Last menstrual period:   · Contraceptive method: oral contraceptives. · History of STDs?: no.     Review of Systems:     Review of Systems   Constitutional: Positive for fatigue. Negative for appetite change, chills and fever. HENT: Negative for ear pain, facial swelling, rhinorrhea, sinus pain, sore throat and trouble swallowing.     Eyes: Negative for discharge and redness. Respiratory: Negative for chest tightness, shortness of breath and wheezing. Cardiovascular: Negative for chest pain and palpitations. Gastrointestinal: Negative for abdominal pain, diarrhea, nausea and vomiting. Endocrine: Negative for polyuria. Genitourinary: Negative for dysuria and urgency. Musculoskeletal: Negative for arthralgias and back pain. Skin: Negative for rash. Neurological: Negative for dizziness, weakness and headaches. Hematological: Negative for adenopathy. Psychiatric/Behavioral: Negative for agitation, behavioral problems, confusion, decreased concentration, dysphoric mood, sleep disturbance and suicidal ideas. The patient is nervous/anxious. +mood swings   All other systems reviewed and are negative. Past Medical History:     Past Medical History:   Diagnosis Date   • Crohn's colitis ([address])    • Crohn's disease ([address])       Past Surgical History:     Past Surgical History:   Procedure Laterality Date   • COLONOSCOPY        Social History:     Social History     Socioeconomic History   • Marital status: Single     Spouse name: None   • Number of children: None   • Years of education: None   • Highest education level: None   Occupational History   • None   Tobacco Use   • Smoking status: Never     Passive exposure: Never   • Smokeless tobacco: Never   Vaping Use   • Vaping Use: Never used   Substance and Sexual Activity   • Alcohol use:  Yes     Alcohol/week: 3.0 standard drinks of alcohol     Types: 2 Glasses of wine, 1 Cans of beer per week   • Drug use: Never   • Sexual activity: Yes     Partners: Male     Birth control/protection: Pill   Other Topics Concern   • None   Social History Narrative   • None     Social Determinants of Health     Financial Resource Strain: Not on file   Food Insecurity: Not on file   Transportation Needs: Not on file   Physical Activity: Not on file   Stress: Not on file   Social Connections: Not on file   Intimate Partner Violence: Not on file   Housing Stability: Not on file      Family History:     Family History   Problem Relation Age of Onset   • Breast cancer Maternal Grandmother       Current Medications:     Current Outpatient Medications   Medication Sig Dispense Refill   • drospirenone-ethinyl estradiol (OTTONIEL) 3-0.02 MG per tablet Take 1 tablet by mouth daily 84 tablet 4   • escitalopram (LEXAPRO) 20 mg tablet Take 1 tablet (20 mg total) by mouth daily 90 tablet 0   • inFLIXimab (Remicade) 100 mg Infuse 449 mg into a venous catheter every 28 days 5 each 6   • montelukast (SINGULAIR) 10 mg tablet Take 1 tablet (10 mg total) by mouth daily at bedtime 90 tablet 0   • ondansetron (ZOFRAN-ODT) 4 mg disintegrating tablet Take 1 tablet (4 mg total) by mouth every 6 (six) hours as needed for nausea or vomiting 20 tablet 0   • scopolamine (TRANSDERM-SCOP) 1 mg/3 days TD 72 hr patch Place 1 patch on the skin over 72 hours every third day 10 patch 0   • celecoxib (CeleBREX) 100 mg capsule Take 1 capsule (100 mg total) by mouth 2 (two) times a day As needed for joint pain, take with food (Patient not taking: Reported on 6/19/2023) 60 capsule 6   • ferrous sulfate 324 (65 Fe) mg Take 1 tablet (324 mg total) by mouth 2 (two) times a day before meals (Patient not taking: Reported on 7/28/2023) 60 tablet 3   • Promethazine-DM (PHENERGAN-DM) 6.25-15 mg/5 mL oral syrup Take 5 mL by mouth every 6 (six) hours as needed for cough (Patient not taking: Reported on 6/19/2023) 118 mL 0     No current facility-administered medications for this visit. Allergies: Allergies   Allergen Reactions   • Penicillins Rash      Physical Exam:     /88   Pulse 97   Temp 97.8 °F (36.6 °C)   Ht 5' 6" (1.676 m)   Wt 104 kg (229 lb 12.8 oz)   LMP 07/19/2023 (Approximate)   SpO2 98%   BMI 37.09 kg/m²     Physical Exam  Vitals and nursing note reviewed. Constitutional:       General: She is not in acute distress.      Appearance: Normal appearance. She is not ill-appearing or diaphoretic. HENT:      Head: Normocephalic and atraumatic. Right Ear: Tympanic membrane, ear canal and external ear normal.      Left Ear: Tympanic membrane, ear canal and external ear normal.      Nose: Nose normal. No congestion or rhinorrhea. Mouth/Throat:      Mouth: Mucous membranes are moist.      Pharynx: Oropharynx is clear. No posterior oropharyngeal erythema. Eyes:      General:         Right eye: No discharge. Left eye: No discharge. Extraocular Movements: Extraocular movements intact. Conjunctiva/sclera: Conjunctivae normal.      Pupils: Pupils are equal, round, and reactive to light. Neck:      Vascular: No carotid bruit. Cardiovascular:      Rate and Rhythm: Normal rate and regular rhythm. Pulses: Normal pulses. Heart sounds: Normal heart sounds. No murmur heard. Pulmonary:      Effort: Pulmonary effort is normal. No respiratory distress. Breath sounds: Normal breath sounds. No wheezing or rhonchi. Abdominal:      General: Abdomen is flat. Bowel sounds are normal. There is no distension. Palpations: There is no mass. Tenderness: There is no abdominal tenderness. Musculoskeletal:         General: No swelling or deformity. Normal range of motion. Cervical back: Normal range of motion and neck supple. No rigidity. Right lower leg: No edema. Left lower leg: No edema. Lymphadenopathy:      Cervical: No cervical adenopathy. Skin:     General: Skin is warm and dry. Capillary Refill: Capillary refill takes less than 2 seconds. Coloration: Skin is not jaundiced. Findings: No bruising, erythema or rash. Neurological:      General: No focal deficit present. Mental Status: She is alert and oriented to person, place, and time. Cranial Nerves: No cranial nerve deficit. Sensory: No sensory deficit.       Gait: Gait normal.      Deep Tendon Reflexes: Reflexes normal.   Psychiatric:         Mood and Affect: Mood normal.         Behavior: Behavior normal.         Thought Content:  Thought content normal.         Judgment: Judgment normal.          [id number]

## 2023-07-28 NOTE — TELEPHONE ENCOUNTER
Patient states that pharmacy informed her that her insurance company is requiring a prior auth for Prestiq    Patient was told by PCP to let us know so it can be initiated right away

## 2023-07-31 ENCOUNTER — APPOINTMENT (OUTPATIENT)
Dept: LAB | Facility: MEDICAL CENTER | Age: 28
End: 2023-07-31
Payer: COMMERCIAL

## 2023-07-31 DIAGNOSIS — K50.119 CROHN'S DISEASE OF COLON WITH COMPLICATION (HCC): ICD-10-CM

## 2023-07-31 DIAGNOSIS — Z00.00 ANNUAL PHYSICAL EXAM: ICD-10-CM

## 2023-07-31 DIAGNOSIS — D50.9 IRON DEFICIENCY ANEMIA, UNSPECIFIED IRON DEFICIENCY ANEMIA TYPE: ICD-10-CM

## 2023-07-31 DIAGNOSIS — R53.83 OTHER FATIGUE: ICD-10-CM

## 2023-07-31 LAB
ALBUMIN SERPL BCP-MCNC: 3.1 G/DL (ref 3.5–5)
ALP SERPL-CCNC: 68 U/L (ref 46–116)
ALT SERPL W P-5'-P-CCNC: 20 U/L (ref 12–78)
ANION GAP SERPL CALCULATED.3IONS-SCNC: 8 MMOL/L
AST SERPL W P-5'-P-CCNC: 18 U/L (ref 5–45)
BILIRUB SERPL-MCNC: 0.44 MG/DL (ref 0.2–1)
BUN SERPL-MCNC: 14 MG/DL (ref 5–25)
CALCIUM ALBUM COR SERPL-MCNC: 9.3 MG/DL (ref 8.3–10.1)
CALCIUM SERPL-MCNC: 8.6 MG/DL (ref 8.3–10.1)
CHLORIDE SERPL-SCNC: 108 MMOL/L (ref 96–108)
CHOLEST SERPL-MCNC: 217 MG/DL
CO2 SERPL-SCNC: 21 MMOL/L (ref 21–32)
CREAT SERPL-MCNC: 0.86 MG/DL (ref 0.6–1.3)
CRP SERPL QL: 13.7 MG/L
ERYTHROCYTE [DISTWIDTH] IN BLOOD BY AUTOMATED COUNT: 12.3 % (ref 11.6–15.1)
FERRITIN SERPL-MCNC: 26 NG/ML (ref 11–307)
GFR SERPL CREATININE-BSD FRML MDRD: 92 ML/MIN/1.73SQ M
GLUCOSE P FAST SERPL-MCNC: 82 MG/DL (ref 65–99)
HCT VFR BLD AUTO: 36 % (ref 34.8–46.1)
HDLC SERPL-MCNC: 55 MG/DL
HGB BLD-MCNC: 12.1 G/DL (ref 11.5–15.4)
IRON SATN MFR SERPL: 23 % (ref 15–50)
IRON SERPL-MCNC: 97 UG/DL (ref 50–170)
LDLC SERPL CALC-MCNC: 108 MG/DL (ref 0–100)
MCH RBC QN AUTO: 29.1 PG (ref 26.8–34.3)
MCHC RBC AUTO-ENTMCNC: 33.6 G/DL (ref 31.4–37.4)
MCV RBC AUTO: 87 FL (ref 82–98)
NONHDLC SERPL-MCNC: 162 MG/DL
PLATELET # BLD AUTO: 302 THOUSANDS/UL (ref 149–390)
PMV BLD AUTO: 9.8 FL (ref 8.9–12.7)
POTASSIUM SERPL-SCNC: 4.3 MMOL/L (ref 3.5–5.3)
PROT SERPL-MCNC: 7.3 G/DL (ref 6.4–8.4)
RBC # BLD AUTO: 4.16 MILLION/UL (ref 3.81–5.12)
SODIUM SERPL-SCNC: 137 MMOL/L (ref 135–147)
TIBC SERPL-MCNC: 429 UG/DL (ref 250–450)
TRIGL SERPL-MCNC: 271 MG/DL
TSH SERPL DL<=0.05 MIU/L-ACNC: 2.5 UIU/ML (ref 0.45–4.5)
WBC # BLD AUTO: 8.79 THOUSAND/UL (ref 4.31–10.16)

## 2023-07-31 PROCEDURE — 36415 COLL VENOUS BLD VENIPUNCTURE: CPT

## 2023-07-31 PROCEDURE — 80053 COMPREHEN METABOLIC PANEL: CPT

## 2023-07-31 PROCEDURE — 85027 COMPLETE CBC AUTOMATED: CPT

## 2023-07-31 PROCEDURE — 84443 ASSAY THYROID STIM HORMONE: CPT

## 2023-07-31 PROCEDURE — 86140 C-REACTIVE PROTEIN: CPT

## 2023-07-31 PROCEDURE — 80061 LIPID PANEL: CPT

## 2023-07-31 PROCEDURE — 82728 ASSAY OF FERRITIN: CPT

## 2023-07-31 PROCEDURE — 83550 IRON BINDING TEST: CPT

## 2023-07-31 PROCEDURE — 83540 ASSAY OF IRON: CPT

## 2023-08-04 NOTE — TELEPHONE ENCOUNTER
Patient's insurance denied medication due to Anxiety diagnosis.  They feel it is not a valid diagnosis for this medication

## 2023-08-08 NOTE — TELEPHONE ENCOUNTER
Hi, this is Beverly Rocha. I'm calling because I'm waiting on a pre authorization for my medication Pristiq. I believe I had a phone call from you guys about this last week. At the end of the week I was out of town. I apologize for not returning your call, but if you could call me with an update, I appreciate  [phone]. Thank you.

## 2023-08-28 DIAGNOSIS — M79.672 LEFT FOOT PAIN: Primary | ICD-10-CM

## 2023-08-29 ENCOUNTER — APPOINTMENT (OUTPATIENT)
Dept: RADIOLOGY | Facility: MEDICAL CENTER | Age: 28
End: 2023-08-29
Payer: COMMERCIAL

## 2023-08-29 DIAGNOSIS — M79.672 LEFT FOOT PAIN: ICD-10-CM

## 2023-08-29 PROCEDURE — 73630 X-RAY EXAM OF FOOT: CPT

## 2023-09-05 ENCOUNTER — ANNUAL EXAM (OUTPATIENT)
Dept: GYNECOLOGY | Facility: CLINIC | Age: 28
End: 2023-09-05
Payer: COMMERCIAL

## 2023-09-05 VITALS
WEIGHT: 226 LBS | SYSTOLIC BLOOD PRESSURE: 122 MMHG | DIASTOLIC BLOOD PRESSURE: 70 MMHG | BODY MASS INDEX: 36.32 KG/M2 | HEIGHT: 66 IN

## 2023-09-05 DIAGNOSIS — Z01.419 ENCOUNTER FOR WELL WOMAN EXAM: Primary | ICD-10-CM

## 2023-09-05 DIAGNOSIS — Z12.39 ENCOUNTER FOR SCREENING BREAST EXAMINATION: ICD-10-CM

## 2023-09-05 PROCEDURE — S0612 ANNUAL GYNECOLOGICAL EXAMINA: HCPCS | Performed by: PHYSICIAN ASSISTANT

## 2023-09-05 NOTE — PROGRESS NOTES
Assessment/Plan:    No problem-specific Assessment & Plan notes found for this encounter. Diagnoses and all orders for this visit:    Encounter for well woman exam    Encounter for screening breast examination          Subjective:      Patient ID: Delfina Corey is a 33 y.o. female. Pt presents for her annual exam today--  She has no complaints except low libido  She has regular bleeding  no pelvic pain  On OTTONIEL--thinking about stopping/taking a break  Bowel and bladder are regular  No breast concerns today      No pap today. rec B and D and YAEL      The following portions of the patient's history were reviewed and updated as appropriate: allergies, current medications, past family history, past medical history, past social history, past surgical history and problem list.    Review of Systems   Constitutional: Negative for chills, fever and unexpected weight change. Gastrointestinal: Negative for abdominal pain, blood in stool, constipation and diarrhea. Genitourinary: Negative. Objective:      /70   Ht 5' 6" (1.676 m)   Wt 103 kg (226 lb)   LMP 08/05/2023 (Exact Date)   BMI 36.48 kg/m²          Physical Exam  Vitals and nursing note reviewed. Constitutional:       Appearance: She is well-developed. HENT:      Head: Normocephalic and atraumatic. Chest:   Breasts:     Right: No inverted nipple, mass, nipple discharge or skin change. Left: No inverted nipple, mass, nipple discharge or skin change. Abdominal:      Palpations: Abdomen is soft. Genitourinary:     Exam position: Supine. Labia:         Right: No rash, tenderness or lesion. Left: No rash, tenderness or lesion. Vagina: Normal.      Cervix: No cervical motion tenderness, discharge or friability. Uterus: Normal.       Adnexa:         Right: No mass, tenderness or fullness. Left: No mass, tenderness or fullness. Musculoskeletal:      Cervical back: Normal range of motion. Lymphadenopathy:      Lower Body: No right inguinal adenopathy. No left inguinal adenopathy.

## 2023-10-12 DIAGNOSIS — M79.672 FOOT PAIN, LEFT: Primary | ICD-10-CM

## 2023-10-16 ENCOUNTER — OFFICE VISIT (OUTPATIENT)
Dept: FAMILY MEDICINE CLINIC | Facility: CLINIC | Age: 28
End: 2023-10-16

## 2023-10-16 VITALS
BODY MASS INDEX: 36.16 KG/M2 | HEIGHT: 66 IN | DIASTOLIC BLOOD PRESSURE: 90 MMHG | WEIGHT: 225 LBS | OXYGEN SATURATION: 98 % | SYSTOLIC BLOOD PRESSURE: 138 MMHG | TEMPERATURE: 98.6 F | HEART RATE: 92 BPM

## 2023-10-16 DIAGNOSIS — Z11.52 ENCOUNTER FOR SCREENING FOR COVID-19: ICD-10-CM

## 2023-10-16 DIAGNOSIS — S05.01XD ABRASION OF RIGHT CORNEA, SUBSEQUENT ENCOUNTER: ICD-10-CM

## 2023-10-16 DIAGNOSIS — J01.90 ACUTE SINUSITIS, RECURRENCE NOT SPECIFIED, UNSPECIFIED LOCATION: Primary | ICD-10-CM

## 2023-10-16 LAB
SARS-COV-2 AG UPPER RESP QL IA: NEGATIVE
VALID CONTROL: NORMAL

## 2023-10-16 RX ORDER — BENZONATATE 200 MG/1
200 CAPSULE ORAL 3 TIMES DAILY PRN
Qty: 30 CAPSULE | Refills: 0 | Status: SHIPPED | OUTPATIENT
Start: 2023-10-16

## 2023-10-16 RX ORDER — METHYLPREDNISOLONE 4 MG/1
TABLET ORAL
Qty: 21 EACH | Refills: 0 | Status: SHIPPED | OUTPATIENT
Start: 2023-10-16

## 2023-10-16 RX ORDER — AZITHROMYCIN 250 MG/1
TABLET, FILM COATED ORAL
Qty: 6 TABLET | Refills: 0 | Status: SHIPPED | OUTPATIENT
Start: 2023-10-16 | End: 2023-10-20

## 2023-10-16 RX ORDER — TOBRAMYCIN 3 MG/ML
1 SOLUTION/ DROPS OPHTHALMIC
Qty: 5 ML | Refills: 0 | Status: SHIPPED | OUTPATIENT
Start: 2023-10-16

## 2023-10-17 ENCOUNTER — OFFICE VISIT (OUTPATIENT)
Age: 28
End: 2023-10-17
Payer: COMMERCIAL

## 2023-10-17 VITALS
HEART RATE: 84 BPM | RESPIRATION RATE: 17 BRPM | BODY MASS INDEX: 36.16 KG/M2 | HEIGHT: 66 IN | SYSTOLIC BLOOD PRESSURE: 148 MMHG | WEIGHT: 225 LBS | DIASTOLIC BLOOD PRESSURE: 99 MMHG

## 2023-10-17 DIAGNOSIS — M79.672 FOOT PAIN, LEFT: ICD-10-CM

## 2023-10-17 DIAGNOSIS — B35.1 ONYCHOMYCOSIS: ICD-10-CM

## 2023-10-17 DIAGNOSIS — M79.672 LEFT FOOT PAIN: ICD-10-CM

## 2023-10-17 DIAGNOSIS — M21.962 ACQUIRED DEFORMITY OF LEFT FOOT: ICD-10-CM

## 2023-10-17 DIAGNOSIS — M21.961 ACQUIRED DEFORMITY OF RIGHT FOOT: ICD-10-CM

## 2023-10-17 DIAGNOSIS — M76.72 PERONEAL TENDINITIS OF LEFT LOWER EXTREMITY: Primary | ICD-10-CM

## 2023-10-17 PROCEDURE — 99203 OFFICE O/P NEW LOW 30 MIN: CPT | Performed by: PODIATRIST

## 2023-10-17 PROCEDURE — 20551 NJX 1 TENDON ORIGIN/INSJ: CPT | Performed by: PODIATRIST

## 2023-10-17 RX ORDER — MELOXICAM 7.5 MG/1
7.5 TABLET ORAL DAILY
Qty: 10 TABLET | Refills: 0 | Status: SHIPPED | OUTPATIENT
Start: 2023-10-17 | End: 2023-10-27

## 2023-10-17 RX ORDER — BETAMETHASONE SODIUM PHOSPHATE AND BETAMETHASONE ACETATE 3; 3 MG/ML; MG/ML
6 INJECTION, SUSPENSION INTRA-ARTICULAR; INTRALESIONAL; INTRAMUSCULAR; SOFT TISSUE
Status: SHIPPED | OUTPATIENT
Start: 2023-10-17

## 2023-10-17 RX ORDER — TERBINAFINE HYDROCHLORIDE 250 MG/1
250 TABLET ORAL DAILY
Qty: 20 TABLET | Refills: 0 | Status: SHIPPED | OUTPATIENT
Start: 2023-10-17 | End: 2023-11-06

## 2023-10-17 RX ADMIN — BETAMETHASONE SODIUM PHOSPHATE AND BETAMETHASONE ACETATE 6 MG: 3; 3 INJECTION, SUSPENSION INTRA-ARTICULAR; INTRALESIONAL; INTRAMUSCULAR; SOFT TISSUE at 11:15

## 2023-10-17 NOTE — PROGRESS NOTES
Patient ID: Tim Monteiro is a 33 y.o. female. HPI: 33 y. o.female presenting with symptoms of sinus pain,pressure, nasal congestion, pnd dry cough , ear and throat pain. Symptoms for the past 3 days and she tested negative for COVID today. She complains of right eye irritation it feels gritty when she closes it with slight discomfort but no itching and the eye was slightly pasted shut with a white discharge this morning. She denies any blurred vision. Her eye started becoming irritated this morning.     SUBJECTIVE    Family History   Problem Relation Age of Onset    Skin cancer Mother     Hypertension Father     Breast cancer Maternal Grandmother     Ovarian cancer Maternal Grandmother      Social History     Socioeconomic History    Marital status: Single     Spouse name: Not on file    Number of children: Not on file    Years of education: Not on file    Highest education level: Not on file   Occupational History    Not on file   Tobacco Use    Smoking status: Never     Passive exposure: Never    Smokeless tobacco: Never   Vaping Use    Vaping Use: Never used   Substance and Sexual Activity    Alcohol use: Not Currently    Drug use: Never    Sexual activity: Yes     Partners: Male     Birth control/protection: Pill   Other Topics Concern    Not on file   Social History Narrative    Not on file     Social Determinants of Health     Financial Resource Strain: Not on file   Food Insecurity: Not on file   Transportation Needs: Not on file   Physical Activity: Not on file   Stress: Not on file   Social Connections: Not on file   Intimate Partner Violence: Not on file   Housing Stability: Not on file     Past Medical History:   Diagnosis Date    Crohn's colitis ([facility])     Crohn's disease ([facility])      Past Surgical History:   Procedure Laterality Date    COLONOSCOPY       Allergies   Allergen Reactions    Penicillins Rash       Current Outpatient Medications:     azithromycin (ZITHROMAX) 250 mg tablet, Take 2 tablets today then 1 tablet daily x 4 days, Disp: 6 tablet, Rfl: 0    benzonatate (TESSALON) 200 MG capsule, Take 1 capsule (200 mg total) by mouth 3 (three) times a day as needed for cough, Disp: 30 capsule, Rfl: 0    desvenlafaxine (PRISTIQ) 100 mg 24 hr tablet, Take 1 tablet (100 mg total) by mouth daily, Disp: 30 tablet, Rfl: 3    ferrous sulfate 324 (65 Fe) mg, Take 1 tablet (324 mg total) by mouth 2 (two) times a day before meals, Disp: 60 tablet, Rfl: 3    inFLIXimab (Remicade) 100 mg, Infuse 449 mg into a venous catheter every 28 days, Disp: 5 each, Rfl: 6    methylPREDNISolone 4 MG tablet therapy pack, Use as directed on package, Disp: 21 each, Rfl: 0    montelukast (SINGULAIR) 10 mg tablet, Take 1 tablet (10 mg total) by mouth daily at bedtime, Disp: 90 tablet, Rfl: 0    ondansetron (ZOFRAN-ODT) 4 mg disintegrating tablet, Take 1 tablet (4 mg total) by mouth every 6 (six) hours as needed for nausea or vomiting, Disp: 20 tablet, Rfl: 3    scopolamine (TRANSDERM-SCOP) 1 mg/3 days TD 72 hr patch, Place 1 patch on the skin over 72 hours every third day, Disp: 10 patch, Rfl: 3    tobramycin (TOBREX) 0.3 % SOLN, Administer 1 drop to the right eye every 4 (four) hours while awake, Disp: 5 mL, Rfl: 0    Review of Systems  Constitutional:     Denies fever, chills, fatigue, weakness ,weight loss, weight gain      ENT: Denies earache, loss of hearing, nosebleed, nasal discharge,but complains of nasal congestion, sore throat,hoarseness and sinus pain and pressure + right eye is pink with white discharge in the morning and irritation when blinking positive excess tearing    Pulmonary: Denies shortness of breath ,cough , dyspnea on exertionon, orthopnea ,+ PND   Cardiovascular:  Denies bradycardia , tachycardia ,palpations, lower extremity, edema leg, claudication  Breast:  Denies new or changing breast lumps,  nipple discharge, nipple changes,  Abdomen:  Denies abdominal pain , anorexia ,indigestion, nausea ,vomiting, constipation , diarrhea  Musculoskeletal: Denies myalgias, arthralgias, joint swelling, joint stiffness ,limb pain, limb swelling  Lymph:+ swollen glands  Gu: no dysuria or urinary frequency  Skin: Denies skin rash, skin lesion, skin wound, itching,dry skin  Neuro: Denies headache, numbness, tingling, confusion, loss of consciousness, dizziness ,vertigo  Psychiatric: Denies feelings of depression, suicidal ideation, anxiety, sleep disturbances    OBJECTIVE  /90   Pulse 92   Temp 98.6 °F (37 °C)   Ht 5' 6" (1.676 m)   Wt 102 kg (225 lb)   LMP 10/02/2023 (Exact Date)   SpO2 98%   BMI 36.32 kg/m²   Constitutional:   NAD, well appearing and well nourished      ENT:   Conjunctiva and lids: + injection, edema, or no discharge : Sclera pink and fluorescein instilled in right eye and very small superficial corneal abrasion noted at 3 o'clock position  Pupils and iris: HARDIK bilaterally   External inspection of ears and nose: normal without deformities or discharge. Otoscopic exam: Canals patent ; tm are dull, with with erythem and effusions  ENasal mucosa, septum and turbinates: Turbinae injection with discharge   Oropharynx:  Moist mucosa, normal tongue and tonsils without lesions. Erythema and injection  of post pharynx with pnd      Pulmonary:Respiratory effort normal rate and rhythm, no increased work of breathing.  Auscultation of lungs:  Clear bilaterally with no adventitious breath sounds       Cardiovascular: regular rate and rhythm, S1 and S2, no murmur, no edema and/or varicosities of LE      Abdomen: Soft and non-distended    Positive bowel sounds    No heptomegaly or splenomegaly    Lymphatic: Anterior  cervical lymphadenopathy         Muscskeletal:  Gait and station: Normal gait     Digits and nails normal without clubbing or cyanosis     Inspection/palpation of joints, bones, and muscles:  No joint tenderness, swelling, full active and passive range of motion      Gu: no suprabubic tenderness, CVA tenderness or urethral discharge  Skin: Normal skin turgor and no rashes    Neuro:    Normal reflexes   Psych:   alert and oriented to person, place and time  normal mood and affect      Assessment/Plan:Diagnoses and all orders for this visit:    Acute sinusitis, recurrence not specified, unspecified location  -     methylPREDNISolone 4 MG tablet therapy pack; Use as directed on package  -     azithromycin (ZITHROMAX) 250 mg tablet; Take 2 tablets today then 1 tablet daily x 4 days  -     benzonatate (TESSALON) 200 MG capsule; Take 1 capsule (200 mg total) by mouth 3 (three) times a day as needed for cough    Abrasion of right cornea, subsequent encounter  -     tobramycin (TOBREX) 0.3 % SOLN; Administer 1 drop to the right eye every 4 (four) hours while awake    Encounter for screening for COVID-19  -     POCT Rapid Covid Ag        Reviewed with patient plan to treat with above plan.   Patient instructed to call in 72 hours if not feeling better or if symptoms worsen

## 2023-10-17 NOTE — PROGRESS NOTES
Assessment/Plan: Peroneal tendinitis left foot. Acquired deformity foot bilateral.  Pain left foot. Acquired pes planus. Mycosis of nail. Plan. Chart reviewed. X-rays reviewed with patient. Patient examined. Patient advised on condition. At this time we will do trigger point injection into the left peroneus brevis tendon. Patient would benefit from orthotic foot control. These will be authorized. In addition patient advised on care of mycosis. She will be started on an oral course of terbinafine. She will start this after Mobic. Return for orthotics or as needed. Foot/lower extremity injection    Performed by: Valdez Dexter DPM  Authorized by: Valdez Dexter DPM    Procedure: Other Assisting Provider: No      Verbal consent obtained?: Yes      Time out: Immediately prior to the procedure a time out was called      Time out performed at:  10/17/2023 11:34 AM    Patient states understanding of procedure being performed: Yes      Patient identity confirmed:  Verbally with patient    Supporting Documentation:     Indications:  Tendon swelling and pain    Procedure Details:    Prep: patient was prepped and draped in usual sterile fashion                Ethyl Chloride was applied      Needle size: 25 G G    Ultrasound Guidance: no      Approach:  Lateral    Laterality:  Left    Location: tendon insertion      Tendon Structures: Peroneus Brevis      Injection Information:       Medications:  6 mg betamethasone acetate-betamethasone sodium phosphate 6 (3-3) mg/mL    Patient tolerance:  Patient tolerated the procedure well with no immediate complications    Dressing: sterile dressing applied               Diagnoses and all orders for this visit:    Peroneal tendinitis of left lower extremity  -     meloxicam (MOBIC) 7.5 mg tablet;  Take 1 tablet (7.5 mg total) by mouth daily for 10 days  -     Foot/lower extremity injection  -     betamethasone acetate-betamethasone sodium phosphate (CELESTONE) injection 6 mg    Foot pain, left  -     Ambulatory Referral to Orthopedic Surgery  -     meloxicam (MOBIC) 7.5 mg tablet; Take 1 tablet (7.5 mg total) by mouth daily for 10 days  -     Foot/lower extremity injection  -     betamethasone acetate-betamethasone sodium phosphate (CELESTONE) injection 6 mg    Left foot pain    Acquired deformity of right foot  -     Orthotics B/L    Acquired deformity of left foot  -     Orthotics B/L    Onychomycosis  -     terbinafine (LamISIL) 250 mg tablet; Take 1 tablet (250 mg total) by mouth daily for 20 days          Subjective: Patient is seen in referral from primary care. Patient has pain in her left foot. This has been ongoing for a year. No history of trauma. Patient is also concerned with her second toe toenail.     Allergies   Allergen Reactions    Penicillins Rash         Current Outpatient Medications:     meloxicam (MOBIC) 7.5 mg tablet, Take 1 tablet (7.5 mg total) by mouth daily for 10 days, Disp: 10 tablet, Rfl: 0    terbinafine (LamISIL) 250 mg tablet, Take 1 tablet (250 mg total) by mouth daily for 20 days, Disp: 20 tablet, Rfl: 0    azithromycin (ZITHROMAX) 250 mg tablet, Take 2 tablets today then 1 tablet daily x 4 days, Disp: 6 tablet, Rfl: 0    benzonatate (TESSALON) 200 MG capsule, Take 1 capsule (200 mg total) by mouth 3 (three) times a day as needed for cough, Disp: 30 capsule, Rfl: 0    desvenlafaxine (PRISTIQ) 100 mg 24 hr tablet, Take 1 tablet (100 mg total) by mouth daily, Disp: 30 tablet, Rfl: 3    ferrous sulfate 324 (65 Fe) mg, Take 1 tablet (324 mg total) by mouth 2 (two) times a day before meals, Disp: 60 tablet, Rfl: 3    inFLIXimab (Remicade) 100 mg, Infuse 449 mg into a venous catheter every 28 days, Disp: 5 each, Rfl: 6    methylPREDNISolone 4 MG tablet therapy pack, Use as directed on package, Disp: 21 each, Rfl: 0    montelukast (SINGULAIR) 10 mg tablet, Take 1 tablet (10 mg total) by mouth daily at bedtime, Disp: 90 tablet, Rfl: 0 ondansetron (ZOFRAN-ODT) 4 mg disintegrating tablet, Take 1 tablet (4 mg total) by mouth every 6 (six) hours as needed for nausea or vomiting, Disp: 20 tablet, Rfl: 3    scopolamine (TRANSDERM-SCOP) 1 mg/3 days TD 72 hr patch, Place 1 patch on the skin over 72 hours every third day, Disp: 10 patch, Rfl: 3    tobramycin (TOBREX) 0.3 % SOLN, Administer 1 drop to the right eye every 4 (four) hours while awake, Disp: 5 mL, Rfl: 0    Current Facility-Administered Medications:     betamethasone acetate-betamethasone sodium phosphate (CELESTONE) injection 6 mg, 6 mg, Infiltration, , Aaron Ugalde, DPM, 6 mg at 10/17/23 1115    Patient Active Problem List   Diagnosis    Crohn's disease of colon with complication (HCC)    Absolute anemia    Murmur    Anxiety    Immunocompromised patient ([address])    Functional diarrhea    Protein-calorie malnutrition, unspecified severity ([address])    Acquired deformity of right foot    Acquired deformity of left foot          Patient ID: Jaqueline Mcclelland is a 33 y.o. female. HPI    The following portions of the patient's history were reviewed and updated as appropriate:     family history includes Breast cancer in her maternal grandmother; Hypertension in her father; Ovarian cancer in her maternal grandmother; Skin cancer in her mother. reports that she has never smoked. She has never been exposed to tobacco smoke. She has never used smokeless tobacco. She reports that she does not currently use alcohol. She reports that she does not use drugs. Vitals:    10/17/23 1117   BP: 148/99   Pulse: 84   Resp: 17       Review of Systems      Objective:  Patient's shoes and socks removed.    Foot Exam    General  General Appearance: appears stated age and healthy   Orientation: alert and oriented to person, place, and time   Affect: appropriate       Right Foot/Ankle     Inspection and Palpation  Swelling: dorsum   Arch: pes planus    Neurovascular  Dorsalis pedis: 3+  Posterior tibial: 3+    Range of Motion    Passive  Ankle dorsiflexion: 5        Left Foot/Ankle      Inspection and Palpation  Tenderness: fifth metatarsal base   Swelling: dorsum   Arch: pes planus    Neurovascular  Dorsalis pedis: 3+  Posterior tibial: 3+    Range of Motion    Passive  Ankle dorsiflexion: 5        Physical Exam  Constitutional:       Appearance: Normal appearance. Cardiovascular:      Rate and Rhythm: Normal rate and regular rhythm. Pulses:           Dorsalis pedis pulses are 3+ on the right side and 3+ on the left side. Posterior tibial pulses are 3+ on the right side and 3+ on the left side. Musculoskeletal:      Left ankle: Tenderness present over the base of 5th metatarsal.   Feet:      Comments: Patient is pronated in stance and gait. She has equinus deformity bilateral.  Pain with palpation peroneus brevis tendon insertion. Tendon test 5/5. Skin:     Capillary Refill: Capillary refill takes less than 2 seconds. Comments: Second nail is dystrophic. It demonstrates distal lysis secondary mycosis. Neurological:      Mental Status: She is alert. Psychiatric:         Mood and Affect: Mood normal.         Behavior: Behavior normal.         Thought Content:  Thought content normal.         Judgment: Judgment normal.

## 2023-10-20 ENCOUNTER — TELEPHONE (OUTPATIENT)
Age: 28
End: 2023-10-20

## 2023-10-20 NOTE — TELEPHONE ENCOUNTER
Called pt. Mariola to let her know orthotics are covered and we need to set up a appointment to be scan for them if she would still like to go through the process.

## 2023-10-27 ENCOUNTER — OFFICE VISIT (OUTPATIENT)
Age: 28
End: 2023-10-27
Payer: COMMERCIAL

## 2023-10-27 VITALS — RESPIRATION RATE: 17 BRPM | WEIGHT: 225 LBS | BODY MASS INDEX: 36.16 KG/M2 | HEIGHT: 66 IN

## 2023-10-27 DIAGNOSIS — M21.962 ACQUIRED DEFORMITY OF LEFT FOOT: Primary | ICD-10-CM

## 2023-10-27 DIAGNOSIS — M21.961 ACQUIRED DEFORMITY OF RIGHT FOOT: ICD-10-CM

## 2023-10-27 DIAGNOSIS — M21.42 ACQUIRED FLAT FOOT, LEFT: ICD-10-CM

## 2023-10-27 DIAGNOSIS — M21.41 ACQUIRED FLAT FOOT, RIGHT: ICD-10-CM

## 2023-10-27 DIAGNOSIS — B35.1 ONYCHOMYCOSIS: ICD-10-CM

## 2023-10-27 DIAGNOSIS — M79.672 LEFT FOOT PAIN: ICD-10-CM

## 2023-10-27 PROCEDURE — 99212 OFFICE O/P EST SF 10 MIN: CPT | Performed by: PODIATRIST

## 2023-10-27 PROCEDURE — S0395 IMPRESSION CASTING FT: HCPCS | Performed by: PODIATRIST

## 2023-10-27 NOTE — PROGRESS NOTES
Assessment/Plan: Acquired deformity of foot bilateral.  Peroneal tendinitis, resolving left foot. Pain upon ambulation. Acquired pes planus. Mycosis nails  Left. Plan. Chart reviewed. Patient examined. Patient advised on condition. At this time patient will finish Lamisil as directed. Today her feet have been casted and molded for custom foot orthotics. These will control pain and control deformity. Return for follow-up. In addition patient will take vitamin B 5         Diagnoses and all orders for this visit:    Acquired deformity of left foot    Acquired deformity of right foot    Acquired flat foot, left    Acquired flat foot, right    Left foot pain    Onychomycosis          Subjective: Patient presents for evaluation. She is doing better since last injection. She did start taking Lamisil.     Allergies   Allergen Reactions    Penicillins Rash         Current Outpatient Medications:     benzonatate (TESSALON) 200 MG capsule, Take 1 capsule (200 mg total) by mouth 3 (three) times a day as needed for cough, Disp: 30 capsule, Rfl: 0    desvenlafaxine (PRISTIQ) 100 mg 24 hr tablet, Take 1 tablet (100 mg total) by mouth daily, Disp: 30 tablet, Rfl: 3    ferrous sulfate 324 (65 Fe) mg, Take 1 tablet (324 mg total) by mouth 2 (two) times a day before meals, Disp: 60 tablet, Rfl: 3    inFLIXimab (Remicade) 100 mg, Infuse 449 mg into a venous catheter every 28 days, Disp: 5 each, Rfl: 6    meloxicam (MOBIC) 7.5 mg tablet, Take 1 tablet (7.5 mg total) by mouth daily for 10 days, Disp: 10 tablet, Rfl: 0    methylPREDNISolone 4 MG tablet therapy pack, Use as directed on package, Disp: 21 each, Rfl: 0    montelukast (SINGULAIR) 10 mg tablet, Take 1 tablet (10 mg total) by mouth daily at bedtime, Disp: 90 tablet, Rfl: 0    ondansetron (ZOFRAN-ODT) 4 mg disintegrating tablet, Take 1 tablet (4 mg total) by mouth every 6 (six) hours as needed for nausea or vomiting, Disp: 20 tablet, Rfl: 3    scopolamine (TRANSDERM-SCOP) 1 mg/3 days TD 72 hr patch, Place 1 patch on the skin over 72 hours every third day, Disp: 10 patch, Rfl: 3    terbinafine (LamISIL) 250 mg tablet, Take 1 tablet (250 mg total) by mouth daily for 20 days, Disp: 20 tablet, Rfl: 0    tobramycin (TOBREX) 0.3 % SOLN, Administer 1 drop to the right eye every 4 (four) hours while awake, Disp: 5 mL, Rfl: 0    Current Facility-Administered Medications:     betamethasone acetate-betamethasone sodium phosphate (CELESTONE) injection 6 mg, 6 mg, Infiltration, , Rula Risen, DPM, 6 mg at 10/17/23 1115    Patient Active Problem List   Diagnosis    Crohn's disease of colon with complication (HCC)    Absolute anemia    Murmur    Anxiety    Immunocompromised patient ([address])    Functional diarrhea    Protein-calorie malnutrition, unspecified severity ([address])    Acquired deformity of right foot    Acquired deformity of left foot          Patient ID: Tim Monteiro is a 33 y.o. female. HPI    The following portions of the patient's history were reviewed and updated as appropriate:     family history includes Breast cancer in her maternal grandmother; Hypertension in her father; Ovarian cancer in her maternal grandmother; Skin cancer in her mother. reports that she has never smoked. She has never been exposed to tobacco smoke. She has never used smokeless tobacco. She reports that she does not currently use alcohol. She reports that she does not use drugs. Vitals:    10/27/23 1151   Resp: 17       Review of Systems      Objective:  Patient's shoes and socks removed.    Foot ExamPhysical Exam       Foot Exam     General  General Appearance: appears stated age and healthy   Orientation: alert and oriented to person, place, and time   Affect: appropriate         Right Foot/Ankle      Inspection and Palpation  Swelling: dorsum   Arch: pes planus     Neurovascular  Dorsalis pedis: 3+  Posterior tibial: 3+     Range of Motion     Passive  Ankle dorsiflexion: 5 Left Foot/Ankle       Inspection and Palpation  Tenderness: fifth metatarsal base   Swelling: dorsum   Arch: pes planus     Neurovascular  Dorsalis pedis: 3+  Posterior tibial: 3+     Range of Motion     Passive  Ankle dorsiflexion: 5           Physical Exam  Constitutional:       Appearance: Normal appearance. Cardiovascular:      Rate and Rhythm: Normal rate and regular rhythm. Pulses:           Dorsalis pedis pulses are 3+ on the right side and 3+ on the left side. Posterior tibial pulses are 3+ on the right side and 3+ on the left side. Musculoskeletal:      Left ankle: Tenderness present over the base of 5th metatarsal.   Feet:      Comments: Patient is pronated in stance and gait. She has equinus deformity bilateral.  Pain with palpation peroneus brevis tendon insertion. Tendon test 5/5. Skin:     Capillary Refill: Capillary refill takes less than 2 seconds. Comments: Second nail is dystrophic. It demonstrates distal lysis secondary mycosis. Neurological:      Mental Status: She is alert. Psychiatric:         Mood and Affect: Mood normal.         Behavior: Behavior normal.         Thought Content:  Thought content normal.         Judgment: Judgment normal.

## 2023-11-07 DIAGNOSIS — T75.3XXA SEVERE MOTION SICKNESS, INITIAL ENCOUNTER: ICD-10-CM

## 2023-11-07 DIAGNOSIS — K50.119 CROHN'S DISEASE OF COLON WITH COMPLICATION (HCC): ICD-10-CM

## 2023-11-08 ENCOUNTER — TELEPHONE (OUTPATIENT)
Age: 28
End: 2023-11-08

## 2023-11-08 RX ORDER — SCOLOPAMINE TRANSDERMAL SYSTEM 1 MG/1
1 PATCH, EXTENDED RELEASE TRANSDERMAL
Qty: 10 PATCH | Refills: 0 | Status: SHIPPED | OUTPATIENT
Start: 2023-11-08

## 2023-11-08 RX ORDER — FERROUS SULFATE 324(65)MG
324 TABLET, DELAYED RELEASE (ENTERIC COATED) ORAL
Qty: 60 TABLET | Refills: 0 | Status: SHIPPED | OUTPATIENT
Start: 2023-11-08

## 2023-11-08 RX ORDER — ONDANSETRON 4 MG/1
4 TABLET, ORALLY DISINTEGRATING ORAL EVERY 6 HOURS PRN
Qty: 20 TABLET | Refills: 1 | Status: SHIPPED | OUTPATIENT
Start: 2023-11-08

## 2023-11-08 NOTE — TELEPHONE ENCOUNTER
Lmom to let her know her orthotics are at the office for  she may pick them up between Monday and Thursday 9-5 pm and Friday 9-4 pm

## 2023-11-23 DIAGNOSIS — Z30.41 SURVEILLANCE FOR BIRTH CONTROL, ORAL CONTRACEPTIVES: ICD-10-CM

## 2023-11-24 RX ORDER — DROSPIRENONE AND ETHINYL ESTRADIOL 0.02-3(28)
1 KIT ORAL DAILY
Qty: 84 TABLET | Refills: 3 | Status: SHIPPED | OUTPATIENT
Start: 2023-11-24

## 2023-11-27 ENCOUNTER — OFFICE VISIT (OUTPATIENT)
Age: 28
End: 2023-11-27
Payer: COMMERCIAL

## 2023-11-27 DIAGNOSIS — M21.42 ACQUIRED FLAT FOOT, LEFT: ICD-10-CM

## 2023-11-27 DIAGNOSIS — M21.961 ACQUIRED DEFORMITY OF RIGHT FOOT: ICD-10-CM

## 2023-11-27 DIAGNOSIS — M21.41 ACQUIRED FLAT FOOT, RIGHT: ICD-10-CM

## 2023-11-27 DIAGNOSIS — M21.962 ACQUIRED DEFORMITY OF LEFT FOOT: Primary | ICD-10-CM

## 2023-11-27 PROCEDURE — L3000 FT INSERT UCB BERKELEY SHELL: HCPCS | Performed by: PODIATRIST

## 2023-11-27 NOTE — PROGRESS NOTES
Assessment/Plan: Pain upon ambulation secondary to acquired deformity of foot. Acquired pes planus. Plan. Chart reviewed. Patient has been dispensed orthotics. She has been given 1 pair to use daily. Aftercare instruction given. Return as needed         Diagnoses and all orders for this visit:    Acquired deformity of left foot    Acquired deformity of right foot    Acquired flat foot, left    Acquired flat foot, right          Subjective: Patient has pain in her feet. She presents for orthotic dispensing.     Allergies   Allergen Reactions    Penicillins Rash         Current Outpatient Medications:     benzonatate (TESSALON) 200 MG capsule, Take 1 capsule (200 mg total) by mouth 3 (three) times a day as needed for cough, Disp: 30 capsule, Rfl: 0    desvenlafaxine (PRISTIQ) 100 mg 24 hr tablet, Take 1 tablet (100 mg total) by mouth daily, Disp: 30 tablet, Rfl: 3    drospirenone-ethinyl estradiol (OTTONIEL) 3-0.02 MG per tablet, TAKE 1 TABLET DAILY, Disp: 84 tablet, Rfl: 3    ferrous sulfate 324 (65 Fe) mg, Take 1 tablet (324 mg total) by mouth 2 (two) times a day before meals, Disp: 60 tablet, Rfl: 0    inFLIXimab (Remicade) 100 mg, Infuse 449 mg into a venous catheter every 28 days, Disp: 5 each, Rfl: 6    meloxicam (MOBIC) 7.5 mg tablet, Take 1 tablet (7.5 mg total) by mouth daily for 10 days, Disp: 10 tablet, Rfl: 0    methylPREDNISolone 4 MG tablet therapy pack, Use as directed on package, Disp: 21 each, Rfl: 0    montelukast (SINGULAIR) 10 mg tablet, Take 1 tablet (10 mg total) by mouth daily at bedtime, Disp: 90 tablet, Rfl: 0    ondansetron (ZOFRAN-ODT) 4 mg disintegrating tablet, Take 1 tablet (4 mg total) by mouth every 6 (six) hours as needed for nausea or vomiting, Disp: 20 tablet, Rfl: 1    scopolamine (TRANSDERM-SCOP) 1 mg/3 days TD 72 hr patch, Place 1 patch on the skin over 72 hours every third day, Disp: 10 patch, Rfl: 0    tobramycin (TOBREX) 0.3 % SOLN, Administer 1 drop to the right eye every 4 (four) hours while awake, Disp: 5 mL, Rfl: 0    Current Facility-Administered Medications:     betamethasone acetate-betamethasone sodium phosphate (CELESTONE) injection 6 mg, 6 mg, Infiltration, , Rae Rasmussen, DPM, 6 mg at 10/17/23 1115    Patient Active Problem List   Diagnosis    Crohn's disease of colon with complication (HCC)    Absolute anemia    Murmur    Anxiety    Immunocompromised patient ([address])    Functional diarrhea    Protein-calorie malnutrition, unspecified severity ([address])    Acquired deformity of right foot    Acquired deformity of left foot          Patient ID: Zbigniew Soria is a 33 y.o. female. HPI    The following portions of the patient's history were reviewed and updated as appropriate:     family history includes Breast cancer in her maternal grandmother; Hypertension in her father; Ovarian cancer in her maternal grandmother; Skin cancer in her mother. reports that she has never smoked. She has never been exposed to tobacco smoke. She has never used smokeless tobacco. She reports that she does not currently use alcohol. She reports that she does not use drugs. There were no vitals filed for this visit. Review of Systems      Objective:  Patient's shoes and socks removed.    Foot ExamPhysical Exam

## 2024-01-02 DIAGNOSIS — F41.9 ANXIETY: ICD-10-CM

## 2024-01-03 RX ORDER — DESVENLAFAXINE 100 MG/1
100 TABLET, EXTENDED RELEASE ORAL DAILY
Qty: 30 TABLET | Refills: 5 | Status: SHIPPED | OUTPATIENT
Start: 2024-01-03

## 2024-02-10 DIAGNOSIS — T75.3XXA SEVERE MOTION SICKNESS, INITIAL ENCOUNTER: ICD-10-CM

## 2024-02-12 RX ORDER — ONDANSETRON 4 MG/1
4 TABLET, ORALLY DISINTEGRATING ORAL EVERY 6 HOURS PRN
Qty: 20 TABLET | Refills: 0 | Status: SHIPPED | OUTPATIENT
Start: 2024-02-12

## 2024-02-12 RX ORDER — SCOLOPAMINE TRANSDERMAL SYSTEM 1 MG/1
1 PATCH, EXTENDED RELEASE TRANSDERMAL
Qty: 10 PATCH | Refills: 0 | Status: SHIPPED | OUTPATIENT
Start: 2024-02-12

## 2024-02-21 DIAGNOSIS — Z00.6 ENCOUNTER FOR EXAMINATION FOR NORMAL COMPARISON OR CONTROL IN CLINICAL RESEARCH PROGRAM: ICD-10-CM

## 2024-02-23 ENCOUNTER — TELEPHONE (OUTPATIENT)
Age: 29
End: 2024-02-23

## 2024-02-23 DIAGNOSIS — Z30.430 ENCOUNTER FOR IUD INSERTION: Primary | ICD-10-CM

## 2024-03-09 ENCOUNTER — APPOINTMENT (OUTPATIENT)
Dept: LAB | Facility: MEDICAL CENTER | Age: 29
End: 2024-03-09
Payer: COMMERCIAL

## 2024-03-09 DIAGNOSIS — Z00.6 ENCOUNTER FOR EXAMINATION FOR NORMAL COMPARISON OR CONTROL IN CLINICAL RESEARCH PROGRAM: ICD-10-CM

## 2024-03-09 PROCEDURE — 36415 COLL VENOUS BLD VENIPUNCTURE: CPT

## 2024-03-17 ENCOUNTER — OFFICE VISIT (OUTPATIENT)
Dept: URGENT CARE | Facility: MEDICAL CENTER | Age: 29
End: 2024-03-17
Payer: COMMERCIAL

## 2024-03-17 VITALS
RESPIRATION RATE: 18 BRPM | OXYGEN SATURATION: 100 % | HEIGHT: 66 IN | SYSTOLIC BLOOD PRESSURE: 142 MMHG | HEART RATE: 128 BPM | WEIGHT: 243 LBS | BODY MASS INDEX: 39.05 KG/M2 | DIASTOLIC BLOOD PRESSURE: 90 MMHG | TEMPERATURE: 100.1 F

## 2024-03-17 DIAGNOSIS — B34.9 VIRAL SYNDROME: Primary | ICD-10-CM

## 2024-03-17 DIAGNOSIS — J03.90 ACUTE TONSILLITIS, UNSPECIFIED ETIOLOGY: ICD-10-CM

## 2024-03-17 DIAGNOSIS — R06.2 WHEEZING: ICD-10-CM

## 2024-03-17 LAB — S PYO AG THROAT QL: NEGATIVE

## 2024-03-17 PROCEDURE — 99213 OFFICE O/P EST LOW 20 MIN: CPT

## 2024-03-17 PROCEDURE — 87070 CULTURE OTHR SPECIMN AEROBIC: CPT

## 2024-03-17 PROCEDURE — 87880 STREP A ASSAY W/OPTIC: CPT

## 2024-03-17 PROCEDURE — 87636 SARSCOV2 & INF A&B AMP PRB: CPT

## 2024-03-17 RX ORDER — ALBUTEROL SULFATE 90 UG/1
2 AEROSOL, METERED RESPIRATORY (INHALATION) EVERY 6 HOURS PRN
Qty: 8.5 G | Refills: 0 | Status: SHIPPED | OUTPATIENT
Start: 2024-03-17

## 2024-03-17 RX ORDER — DEXTROMETHORPHAN HYDROBROMIDE AND PROMETHAZINE HYDROCHLORIDE 15; 6.25 MG/5ML; MG/5ML
5 SYRUP ORAL 4 TIMES DAILY PRN
Qty: 180 ML | Refills: 0 | Status: SHIPPED | OUTPATIENT
Start: 2024-03-17

## 2024-03-17 RX ORDER — METHYLPREDNISOLONE 4 MG/1
TABLET ORAL
Qty: 1 EACH | Refills: 0 | Status: SHIPPED | OUTPATIENT
Start: 2024-03-18

## 2024-03-17 NOTE — LETTER
[facility] [place]  [address]  [facility] [address]  Dept: [phone]    March 17, 2024    Patient: Elena Harmon  YOB: 1995    Elena Harmon was seen and evaluated at our [facility].     Please note if Covid and Flu tests are negative or positive, Elena may return to work on 3/22/2024 or sooner if fever free for 24 hours without the use of a fever reducing agent and symptoms are resolving.    Sincerely,    KASSY Noland

## 2024-03-17 NOTE — PATIENT INSTRUCTIONS
A virus is likely causing Elena's symptoms. Most colds are caused by virus, which does not respond to antibiotics. Typically viruses are self limiting and will go away own their own. There are both over the counter medicines or prescriptions medicines that can be used to help with symptoms until the virus had a chance to run it's course.     Use albuterol inhaler as prescribed  Take over the counter Mucinex during the day  Use Phenergan-DM at night--can make you drowsy    Take steroids as prescribed.   Recommend to take them in the morning and with food  Do not take Ibuprofen while on steroids.   May take Acetaminophen for pain.     Vitamin D3 2000 IU daily  Vitamin C 1000mg twice per day  Multivitamin daily  Some studies suggest that Zinc 12.5-15mg every 2 hours while awake x 5 days may shorten the duration cold symptoms by 1-2 days.     Recommend the following options: room humidifier, vicks vapo rub, hot/steamy shower.  Offer fluids frequently to help with hydration, as staying hydrated helps loosen up thick mucous.   May drink warm water with honey. May use lemon.  Tylenol/Ibuprofen for pain/fever.  Encourage coughing into the elbow instead of the hand.   Washing hands frequently with warm water and soap may help stop spread of infection.  Rest  Nasal saline spray  Salt water gargles and chloraseptic spray  Tsp of honey  Warm tea with honey. May use lemon.  Throat lozenges  Throat Coat Tea  Warm compresses over sinuses  Steam treatment (utilize proper safety precautions when in contact with hot water/steam)    If symptoms do not improve or worsen, please follow with PCP for further evaluation.    Follow up with PCP in 3-5 days.  Proceed to  ER if symptoms worsen.    If tests are performed, our office will contact you with results only if changes need to made to the care plan discussed with you at the visit. You can review your full results on [facility] Mychart.

## 2024-03-18 LAB
FLUAV RNA RESP QL NAA+PROBE: POSITIVE
FLUBV RNA RESP QL NAA+PROBE: NEGATIVE
SARS-COV-2 RNA RESP QL NAA+PROBE: NEGATIVE

## 2024-03-19 LAB — BACTERIA THROAT CULT: NORMAL

## 2024-03-20 ENCOUNTER — OFFICE VISIT (OUTPATIENT)
Dept: FAMILY MEDICINE CLINIC | Facility: CLINIC | Age: 29
End: 2024-03-20
Payer: COMMERCIAL

## 2024-03-20 VITALS
WEIGHT: 237 LBS | HEART RATE: 103 BPM | DIASTOLIC BLOOD PRESSURE: 98 MMHG | BODY MASS INDEX: 38.09 KG/M2 | OXYGEN SATURATION: 95 % | TEMPERATURE: 97.4 F | SYSTOLIC BLOOD PRESSURE: 134 MMHG | HEIGHT: 66 IN

## 2024-03-20 DIAGNOSIS — J06.9 UPPER RESPIRATORY TRACT INFECTION, UNSPECIFIED TYPE: Primary | ICD-10-CM

## 2024-03-20 PROCEDURE — 99213 OFFICE O/P EST LOW 20 MIN: CPT | Performed by: FAMILY MEDICINE

## 2024-03-20 RX ORDER — BENZONATATE 200 MG/1
200 CAPSULE ORAL 3 TIMES DAILY PRN
Qty: 30 CAPSULE | Refills: 0 | Status: SHIPPED | OUTPATIENT
Start: 2024-03-20

## 2024-03-20 RX ORDER — AZITHROMYCIN 250 MG/1
TABLET, FILM COATED ORAL
Qty: 6 TABLET | Refills: 0 | Status: SHIPPED | OUTPATIENT
Start: 2024-03-20 | End: 2024-03-24

## 2024-03-25 ENCOUNTER — OFFICE VISIT (OUTPATIENT)
Dept: OBGYN CLINIC | Facility: CLINIC | Age: 29
End: 2024-03-25
Payer: COMMERCIAL

## 2024-03-25 VITALS
WEIGHT: 235 LBS | SYSTOLIC BLOOD PRESSURE: 124 MMHG | DIASTOLIC BLOOD PRESSURE: 84 MMHG | BODY MASS INDEX: 37.77 KG/M2 | HEIGHT: 66 IN

## 2024-03-25 DIAGNOSIS — Z30.09 ENCOUNTER FOR COUNSELING REGARDING CONTRACEPTION: Primary | ICD-10-CM

## 2024-03-25 DIAGNOSIS — Z30.430 ENCOUNTER FOR IUD INSERTION: ICD-10-CM

## 2024-03-25 PROCEDURE — 99213 OFFICE O/P EST LOW 20 MIN: CPT

## 2024-03-25 NOTE — PROGRESS NOTES
Assessment/Plan:  -Patient would like to proceed with IUD insertion - Tito, will schedule appointment. Advised to avoid unprotected intercourse for 2 weeks prior to insertion and to take 600mg ibuprofen prior to appointment.     - Discussed contraceptive options including COCP, Patch, Nuvaring, Depo provera injection, Nexplanon and IUD  - Discussed usage, insertion processes and SE profiles of each method, including risks and benefits      Diagnoses and all orders for this visit:    Encounter for counseling regarding contraception    Encounter for IUD insertion  -     Ambulatory Referral to Obstetrics / Gynecology        Subjective:      Patient ID: Elena Harmon is a 28 y.o. female.    Patient is a 27 y/o G0 F presenting to the office for contraception consultation. Patient currently in a new relationship and would like contraception. Patient was previously on an OCP - Jesenia. Stopped taking OCP ~September 2023 due to concern for blood clots and ADRs. Patient had a coworker who had a blood clot from her COCP and was in the ICU. Patient is now considering an IUD. Patient has a long history of irregular periods which is why she was initially started on Jesenia. Since stopping Jesenia, she has had 4 normal periods with LMP being 1/9/24. She has not had a period since 1/9/24.   Patient denies personal history of DVT/PE, clotting disorders.       The following portions of the patient's history were reviewed and updated as appropriate: allergies, current medications, past family history, past medical history, past social history, past surgical history, and problem list.    Review of Systems   Constitutional:  Negative for chills and fever.   Respiratory:  Negative for shortness of breath.    Cardiovascular:  Negative for chest pain.   Gastrointestinal:  Negative for abdominal pain, constipation and diarrhea.   Genitourinary:  Negative for dysuria, frequency, pelvic pain, vaginal discharge and vaginal pain.  "  Psychiatric/Behavioral:  Negative for self-injury and suicidal ideas.          Objective:    /84 (BP Location: Right arm, Patient Position: Sitting, Cuff Size: Large)   Ht 5' 6\" (1.676 m)   Wt 107 kg (235 lb)   LMP 01/09/2024 (Approximate)   BMI 37.93 kg/m²      Physical Exam  Vitals and nursing note reviewed.   Constitutional:       Appearance: Normal appearance.   HENT:      Head: Normocephalic and atraumatic.   Neurological:      General: No focal deficit present.      Mental Status: She is alert and oriented to person, place, and time.   Psychiatric:         Mood and Affect: Mood normal.         Behavior: Behavior normal.             I have spent a total time of 25 minutes on 03/25/24 in caring for this patient including Risks and benefits of tx options, Instructions for management, Patient and family education, Documenting in the medical record, and Obtaining or reviewing history  .   "

## 2024-03-27 DIAGNOSIS — Z30.011 ENCOUNTER FOR INITIAL PRESCRIPTION OF CONTRACEPTIVE PILLS: Primary | ICD-10-CM

## 2024-03-27 LAB
APOB+LDLR+PCSK9 GENE MUT ANL BLD/T: NOT DETECTED
BRCA1+BRCA2 DEL+DUP + FULL MUT ANL BLD/T: NOT DETECTED
MLH1+MSH2+MSH6+PMS2 GN DEL+DUP+FUL M: NOT DETECTED

## 2024-03-27 NOTE — PROGRESS NOTES
Patient ID: Elena Harmon is a 28 y.o. female.    HPI: 28 y.o.female presenting with 4 day history of sore throat, nasal congestion, ear pain,pnd and cough.  Pt denies any fever, chills, or body aches.      SUBJECTIVE    Family History   Problem Relation Age of Onset    Skin cancer Mother     Hypertension Father     Breast cancer Maternal Grandmother     Ovarian cancer Maternal Grandmother      Social History     Socioeconomic History    Marital status: Single     Spouse name: Not on file    Number of children: Not on file    Years of education: Not on file    Highest education level: Not on file   Occupational History    Not on file   Tobacco Use    Smoking status: Never     Passive exposure: Never    Smokeless tobacco: Never   Vaping Use    Vaping status: Never Used   Substance and Sexual Activity    Alcohol use: Yes     Alcohol/week: 5.0 standard drinks of alcohol     Types: 4 Glasses of wine, 1 Shots of liquor per week    Drug use: Never    Sexual activity: Yes     Partners: Male     Birth control/protection: Condom Male   Other Topics Concern    Not on file   Social History Narrative    Not on file     Social Determinants of Health     Financial Resource Strain: Not on file   Food Insecurity: Not on file   Transportation Needs: Not on file   Physical Activity: Not on file   Stress: Not on file   Social Connections: Not on file   Intimate Partner Violence: Not on file   Housing Stability: Not on file     Past Medical History:   Diagnosis Date    Abnormal Pap smear of cervix     Anemia     Crohn's colitis (HCC)     Crohn's disease (HCC)      Past Surgical History:   Procedure Laterality Date    COLONOSCOPY       Allergies   Allergen Reactions    Penicillins Rash       Current Outpatient Medications:     albuterol (ProAir HFA) 90 mcg/act inhaler, Inhale 2 puffs every 6 (six) hours as needed for wheezing, Disp: 8.5 g, Rfl: 0    benzonatate (TESSALON) 200 MG capsule, Take 1 capsule (200 mg total) by mouth 3 (three)  times a day as needed for cough, Disp: 30 capsule, Rfl: 0    desvenlafaxine (PRISTIQ) 100 mg 24 hr tablet, TAKE 1 TABLET (100 MG TOTAL) BY MOUTH DAILY, Disp: 30 tablet, Rfl: 5    inFLIXimab (Remicade) 100 mg, Infuse 449 mg into a venous catheter every 28 days, Disp: 5 each, Rfl: 6    methylPREDNISolone 4 MG tablet therapy pack, Use as directed on package Do not start before March 18, 2024., Disp: 1 each, Rfl: 0    montelukast (SINGULAIR) 10 mg tablet, Take 1 tablet (10 mg total) by mouth daily at bedtime, Disp: 90 tablet, Rfl: 0    ondansetron (ZOFRAN-ODT) 4 mg disintegrating tablet, Take 1 tablet (4 mg total) by mouth every 6 (six) hours as needed for nausea or vomiting, Disp: 20 tablet, Rfl: 0    promethazine-dextromethorphan (PHENERGAN-DM) 6.25-15 mg/5 mL oral syrup, Take 5 mL by mouth 4 (four) times a day as needed for cough, Disp: 180 mL, Rfl: 0    scopolamine (TRANSDERM-SCOP) 1 mg/3 days TD 72 hr patch, Place 1 patch on the skin over 72 hours every third day, Disp: 10 patch, Rfl: 0    Current Facility-Administered Medications:     betamethasone acetate-betamethasone sodium phosphate (CELESTONE) injection 6 mg, 6 mg, Infiltration, , Milton Wong, DPM, 6 mg at 10/17/23 1115    Review of Systems  Constitutional:     Denies fever, chills ,fatigue ,weakness ,weight loss, weight gain     ENT: Denies loss of hearing ,nosebleed, nasal discharge ,hoarseness; but admits to nasal congestion , sore throat and ear pain.    Pulmonary: Denies shortness of breath ,dyspnea on exertion, orthopnea ; but admits to cough and pnd  Cardiovascular:  Denies bradycardia , tachycardia  ,palpations, lower extremity edema leg, claudication  Breast:  Denies new or changing breast lumps ,nipple discharge ,nipple changes  Abdomen:  Denies abdominal pain , anorexia , indigestion, nausea, vomiting, constipation, diarrhea  Musculoskeletal: Denies myalgias, arthralgias, joint swelling, joint stiffness , limb pain, limb swelling  Lymph: +  "swollen glands  Gu: Denies polyuria or dysuria  Skin: Denies skin rash, skin lesion, skin wound, itching, dry skin  Neuro: Denies headache, numbness, tingling, confusion, loss of consciousness, dizziness, vertigo  Psychiatric: Denies feelings of depression, suicidal ideation, anxiety, sleep disturbances    OBJECTIVE  /98   Pulse 103   Temp (!) 97.4 °F (36.3 °C)   Ht 5' 6\" (1.676 m)   Wt 108 kg (237 lb)   LMP 01/09/2024 (Approximate)   SpO2 95%   BMI 38.25 kg/m²   Constitutional:   NAD, well appearing and well nourished     ENT:   Conjunctiva and lids: no injection, edema, or discharge    Pupils and iris: HARDIK bilaterally  External inspection of ears and nose: normal without deformities or discharge.     Otoscopic exam: Canals patent without erythema, tm dull and erythematous, effusions bilaterally   Nasal mucosa, septum and turbinates: + turbinate injection, nasal discharge         Oropharynx:  Moist mucosa, normal tongue and tonsils without lesions.+ erythema and injection of posterior pharynx with pnd    Pulmonary:Respiratory effort normal rate and rhythm, no increased work of breathing. Auscultation of lungs:  Clear bilaterally with no adventitious breath sounds     Cardiovascular: regular rate and rhythm, S1 and S2, no murmur, no edema and/or varicosities of LE     Abdomen: Soft and nontender with + bowel sounds  No heptomegaly or splenomegaly     Gu: no suprapubic tenderness or CVA tenderness  Lymphatic: + anterior  cervical lymphadenopathy      Musculoskeletal:  Gait and station: Normal gait      Digits and nails normal without clubbing or cyanosis      Inspection/palpation of joints, bones, and muscles:  No joint tenderness, swelling, full active and passive range of motion       Skin: Normal skin turgor and no rashes      Neuro:    Normal reflexes  Pych:   alert and oriented to person, place and time     normal mood and affect      Assessment/Plan:Diagnoses and all orders for this " visit:    Upper respiratory tract infection, unspecified type  -     benzonatate (TESSALON) 200 MG capsule; Take 1 capsule (200 mg total) by mouth 3 (three) times a day as needed for cough  -     azithromycin (ZITHROMAX) 250 mg tablet; Take 2 tablets today then 1 tablet daily x 4 days        Reviewed with patient plan to treat with above plan.    Patient instructed to call in 72 hours if not feeling better or if symptoms worsen

## 2024-03-30 DIAGNOSIS — J32.9 CHRONIC CONGESTION OF PARANASAL SINUS: ICD-10-CM

## 2024-03-30 RX ORDER — MONTELUKAST SODIUM 10 MG/1
10 TABLET ORAL
Qty: 90 TABLET | Refills: 1 | Status: SHIPPED | OUTPATIENT
Start: 2024-03-30

## 2024-04-04 ENCOUNTER — TELEPHONE (OUTPATIENT)
Age: 29
End: 2024-04-04

## 2024-04-04 NOTE — TELEPHONE ENCOUNTER
Pt needed an appt for eye pain. I offered her the lastest appt today and also one tomorrow. Pt declined due to work and  may go to u/c. She will call back if appt needed.

## 2024-04-05 ENCOUNTER — OFFICE VISIT (OUTPATIENT)
Dept: FAMILY MEDICINE CLINIC | Facility: CLINIC | Age: 29
End: 2024-04-05
Payer: COMMERCIAL

## 2024-04-05 VITALS
WEIGHT: 240.5 LBS | TEMPERATURE: 97.2 F | HEIGHT: 66 IN | BODY MASS INDEX: 38.65 KG/M2 | DIASTOLIC BLOOD PRESSURE: 82 MMHG | SYSTOLIC BLOOD PRESSURE: 126 MMHG

## 2024-04-05 DIAGNOSIS — B30.9 ACUTE VIRAL CONJUNCTIVITIS OF RIGHT EYE: Primary | ICD-10-CM

## 2024-04-05 PROCEDURE — 99214 OFFICE O/P EST MOD 30 MIN: CPT | Performed by: NURSE PRACTITIONER

## 2024-04-05 NOTE — PROGRESS NOTES
"Assessment/Plan:     Diagnoses and all orders for this visit:    Acute viral conjunctivitis of right eye        Discussed with patient plan to have patient continue to use the tobramycin eye drops over the weekend  Patient instructed to call if no improvement in 72 hours or symptoms worsen    Subjective:      Patient ID: Elena Harmon is a 28 y.o. female.    28 y.o.female presenting with right eye redness and scratchy feeling for the past three days. She had a right corneal abrasion in October 2023 and had left over eye drops, so she used them. She does report that her symptoms are improving. She continues to have some blurry vision so wanted to have it checked.       The following portions of the patient's history were reviewed and updated as appropriate: allergies, current medications, past family history, past medical history, past social history, past surgical history, and problem list.    Review of Systems   Constitutional:  Negative for chills, fatigue and fever.   HENT: Negative.     Eyes:  Positive for pain, redness and visual disturbance. Negative for discharge and itching.   Respiratory: Negative.     Cardiovascular: Negative.    Gastrointestinal: Negative.    Musculoskeletal: Negative.    Neurological: Negative.    Psychiatric/Behavioral: Negative.         Objective:    /82   Temp (!) 97.2 °F (36.2 °C)   Ht 5' 6\" (1.676 m)   Wt 109 kg (240 lb 8 oz)   LMP 01/09/2024 (Approximate)   BMI 38.82 kg/m² (Reviewed)     Physical Exam  Vitals reviewed.   Constitutional:       General: She is not in acute distress.     Appearance: She is well-developed and well-groomed. She is not ill-appearing.   HENT:      Head: Normocephalic and atraumatic.      Right Ear: External ear normal.      Left Ear: External ear normal.      Nose: Nose normal.      Mouth/Throat:      Mouth: Mucous membranes are moist.      Pharynx: Oropharynx is clear.   Eyes:      General: Lids are normal. Lids are everted, no foreign bodies " appreciated. Vision grossly intact. Gaze aligned appropriately.      Extraocular Movements: Extraocular movements intact.      Conjunctiva/sclera:      Right eye: Right conjunctiva is injected. No chemosis or exudate.     Pupils: Pupils are equal, round, and reactive to light.   Neck:      Trachea: Trachea and phonation normal.   Cardiovascular:      Rate and Rhythm: Normal rate and regular rhythm.      Heart sounds: Normal heart sounds.   Pulmonary:      Effort: Pulmonary effort is normal.      Breath sounds: Normal breath sounds.   Musculoskeletal:      Cervical back: Neck supple.   Skin:     General: Skin is warm and dry.      Capillary Refill: Capillary refill takes less than 2 seconds.   Neurological:      General: No focal deficit present.      Mental Status: She is alert and oriented to person, place, and time.   Psychiatric:         Mood and Affect: Mood normal.         Behavior: Behavior normal. Behavior is cooperative.         Thought Content: Thought content normal.

## 2024-04-19 DIAGNOSIS — N91.1 AMENORRHEA, SECONDARY: Primary | ICD-10-CM

## 2024-04-22 ENCOUNTER — APPOINTMENT (OUTPATIENT)
Dept: LAB | Facility: CLINIC | Age: 29
End: 2024-04-22
Payer: COMMERCIAL

## 2024-04-22 DIAGNOSIS — N91.1 AMENORRHEA, SECONDARY: ICD-10-CM

## 2024-04-22 LAB
B-HCG SERPL-ACNC: <0.6 MIU/ML (ref 0–5)
FSH SERPL-ACNC: 3.1 MIU/ML
LH SERPL-ACNC: 4.3 MIU/ML
PROLACTIN SERPL-MCNC: 19.99 NG/ML (ref 3.34–26.72)
TSH SERPL DL<=0.05 MIU/L-ACNC: 0.93 UIU/ML (ref 0.45–4.5)

## 2024-04-22 PROCEDURE — 83001 ASSAY OF GONADOTROPIN (FSH): CPT

## 2024-04-22 PROCEDURE — 83002 ASSAY OF GONADOTROPIN (LH): CPT

## 2024-04-22 PROCEDURE — 84443 ASSAY THYROID STIM HORMONE: CPT

## 2024-04-22 PROCEDURE — 36415 COLL VENOUS BLD VENIPUNCTURE: CPT

## 2024-04-22 PROCEDURE — 84146 ASSAY OF PROLACTIN: CPT

## 2024-04-22 PROCEDURE — 84702 CHORIONIC GONADOTROPIN TEST: CPT

## 2024-04-23 ENCOUNTER — PATIENT MESSAGE (OUTPATIENT)
Dept: GASTROENTEROLOGY | Facility: CLINIC | Age: 29
End: 2024-04-23

## 2024-04-23 NOTE — PATIENT COMMUNICATION
I called and spoke with the patient. Patient agreeable to see Ciro Dow PA-C. Pt scheduled 5/1 Virtual.

## 2024-05-01 ENCOUNTER — TELEMEDICINE (OUTPATIENT)
Dept: GASTROENTEROLOGY | Facility: CLINIC | Age: 29
End: 2024-05-01
Payer: COMMERCIAL

## 2024-05-01 ENCOUNTER — APPOINTMENT (OUTPATIENT)
Dept: LAB | Facility: CLINIC | Age: 29
End: 2024-05-01
Payer: COMMERCIAL

## 2024-05-01 ENCOUNTER — TELEPHONE (OUTPATIENT)
Age: 29
End: 2024-05-01

## 2024-05-01 ENCOUNTER — TELEPHONE (OUTPATIENT)
Dept: GASTROENTEROLOGY | Facility: CLINIC | Age: 29
End: 2024-05-01

## 2024-05-01 DIAGNOSIS — D84.9 IMMUNOCOMPROMISED PATIENT (HCC): ICD-10-CM

## 2024-05-01 DIAGNOSIS — K50.119 CROHN'S DISEASE OF COLON WITH COMPLICATION (HCC): Primary | ICD-10-CM

## 2024-05-01 DIAGNOSIS — K50.119 CROHN'S DISEASE OF COLON WITH COMPLICATION (HCC): ICD-10-CM

## 2024-05-01 LAB
25(OH)D3 SERPL-MCNC: 23 NG/ML (ref 30–100)
CRP SERPL QL: 8.3 MG/L
ERYTHROCYTE [DISTWIDTH] IN BLOOD BY AUTOMATED COUNT: 13.2 % (ref 11.6–15.1)
FERRITIN SERPL-MCNC: 28 NG/ML (ref 11–307)
HCT VFR BLD AUTO: 38 % (ref 34.8–46.1)
HGB BLD-MCNC: 12.9 G/DL (ref 11.5–15.4)
IRON SATN MFR SERPL: 13 % (ref 15–50)
IRON SERPL-MCNC: 50 UG/DL (ref 50–212)
MCH RBC QN AUTO: 29.8 PG (ref 26.8–34.3)
MCHC RBC AUTO-ENTMCNC: 33.9 G/DL (ref 31.4–37.4)
MCV RBC AUTO: 88 FL (ref 82–98)
PLATELET # BLD AUTO: 338 THOUSANDS/UL (ref 149–390)
PMV BLD AUTO: 10.1 FL (ref 8.9–12.7)
RBC # BLD AUTO: 4.33 MILLION/UL (ref 3.81–5.12)
TIBC SERPL-MCNC: 372 UG/DL (ref 250–450)
UIBC SERPL-MCNC: 322 UG/DL (ref 155–355)
VIT B12 SERPL-MCNC: 369 PG/ML (ref 180–914)
WBC # BLD AUTO: 9 THOUSAND/UL (ref 4.31–10.16)

## 2024-05-01 PROCEDURE — 83550 IRON BINDING TEST: CPT

## 2024-05-01 PROCEDURE — 80053 COMPREHEN METABOLIC PANEL: CPT

## 2024-05-01 PROCEDURE — 80230 DRUG ASSAY INFLIXIMAB: CPT

## 2024-05-01 PROCEDURE — 82607 VITAMIN B-12: CPT

## 2024-05-01 PROCEDURE — 86705 HEP B CORE ANTIBODY IGM: CPT

## 2024-05-01 PROCEDURE — 86704 HEP B CORE ANTIBODY TOTAL: CPT

## 2024-05-01 PROCEDURE — 86480 TB TEST CELL IMMUN MEASURE: CPT

## 2024-05-01 PROCEDURE — 86803 HEPATITIS C AB TEST: CPT

## 2024-05-01 PROCEDURE — 82728 ASSAY OF FERRITIN: CPT

## 2024-05-01 PROCEDURE — 36415 COLL VENOUS BLD VENIPUNCTURE: CPT

## 2024-05-01 PROCEDURE — 99214 OFFICE O/P EST MOD 30 MIN: CPT | Performed by: DIETITIAN, REGISTERED

## 2024-05-01 PROCEDURE — 82306 VITAMIN D 25 HYDROXY: CPT

## 2024-05-01 PROCEDURE — 86140 C-REACTIVE PROTEIN: CPT

## 2024-05-01 PROCEDURE — 87340 HEPATITIS B SURFACE AG IA: CPT

## 2024-05-01 PROCEDURE — 83540 ASSAY OF IRON: CPT

## 2024-05-01 PROCEDURE — 85027 COMPLETE CBC AUTOMATED: CPT

## 2024-05-01 PROCEDURE — 82397 CHEMILUMINESCENT ASSAY: CPT

## 2024-05-01 RX ORDER — SOD SULF/POT CHLORIDE/MAG SULF 1.479 G
24 TABLET ORAL ONCE
Qty: 24 TABLET | Refills: 0 | Status: SHIPPED | OUTPATIENT
Start: 2024-05-01 | End: 2024-05-01

## 2024-05-01 NOTE — TELEPHONE ENCOUNTER
Left voicemail and requested call back     Scheduled procedure - June 18 with Dr Heart and 6 mos follow up October 28 with Dr Heart    Procedure: colon  Date: June 18  Physician performing: Dr. Heart  Location of procedure:  [place]  Instructions given to patient: Sutab  Diabetic: n.a  Clearances: n/a

## 2024-05-01 NOTE — TELEPHONE ENCOUNTER
Scheduled date of colonoscopy (as of today): 8/23/24  Physician performing colonoscopy: Sly   Location of colonoscopy: [place]  Bowel prep reviewed with patient: Sutab  Instructions reviewed with patient by: christopher  Clearances: n/a

## 2024-05-02 LAB
GAMMA INTERFERON BACKGROUND BLD IA-ACNC: 0.07 IU/ML
HBV CORE AB SER QL: NORMAL
HBV CORE IGM SER QL: NORMAL
HBV SURFACE AG SER QL: NORMAL
HCV AB SER QL: NORMAL
M TB IFN-G BLD-IMP: NEGATIVE
M TB IFN-G CD4+ BCKGRND COR BLD-ACNC: 0 IU/ML
M TB IFN-G CD4+ BCKGRND COR BLD-ACNC: 0.01 IU/ML
MITOGEN IGNF BCKGRD COR BLD-ACNC: 9.93 IU/ML

## 2024-05-08 LAB
ALBUMIN SERPL BCP-MCNC: 4.3 G/DL (ref 3.5–5)
ALP SERPL-CCNC: 62 U/L (ref 34–104)
ALT SERPL W P-5'-P-CCNC: 29 U/L (ref 7–52)
ANION GAP SERPL CALCULATED.3IONS-SCNC: 10 MMOL/L (ref 4–13)
AST SERPL W P-5'-P-CCNC: 28 U/L (ref 13–39)
BILIRUB SERPL-MCNC: 0.39 MG/DL (ref 0.2–1)
BUN SERPL-MCNC: 14 MG/DL (ref 5–25)
CALCIUM SERPL-MCNC: 9.3 MG/DL (ref 8.4–10.2)
CHLORIDE SERPL-SCNC: 102 MMOL/L (ref 96–108)
CO2 SERPL-SCNC: 26 MMOL/L (ref 21–32)
CREAT SERPL-MCNC: 0.74 MG/DL (ref 0.6–1.3)
GFR SERPL CREATININE-BSD FRML MDRD: 110 ML/MIN/1.73SQ M
GLUCOSE SERPL-MCNC: 108 MG/DL (ref 65–140)
POTASSIUM SERPL-SCNC: 4 MMOL/L (ref 3.5–5.3)
PROT SERPL-MCNC: 7.8 G/DL (ref 6.4–8.4)
SODIUM SERPL-SCNC: 138 MMOL/L (ref 135–147)

## 2024-05-11 LAB
INFLIXIMAB AB SERPL-MCNC: <22 NG/ML
INFLIXIMAB SERPL-MCNC: 15 UG/ML

## 2024-05-31 ENCOUNTER — PATIENT MESSAGE (OUTPATIENT)
Dept: FAMILY MEDICINE CLINIC | Facility: CLINIC | Age: 29
End: 2024-05-31

## 2024-06-03 ENCOUNTER — OFFICE VISIT (OUTPATIENT)
Dept: FAMILY MEDICINE CLINIC | Facility: CLINIC | Age: 29
End: 2024-06-03
Payer: COMMERCIAL

## 2024-06-03 VITALS
TEMPERATURE: 97.9 F | SYSTOLIC BLOOD PRESSURE: 118 MMHG | HEART RATE: 87 BPM | WEIGHT: 238 LBS | HEIGHT: 66 IN | BODY MASS INDEX: 38.25 KG/M2 | OXYGEN SATURATION: 98 % | DIASTOLIC BLOOD PRESSURE: 82 MMHG

## 2024-06-03 DIAGNOSIS — L30.9 ECZEMA, UNSPECIFIED TYPE: Primary | ICD-10-CM

## 2024-06-03 PROCEDURE — 99214 OFFICE O/P EST MOD 30 MIN: CPT | Performed by: NURSE PRACTITIONER

## 2024-06-03 RX ORDER — TRIAMCINOLONE ACETONIDE 1 MG/G
CREAM TOPICAL 2 TIMES DAILY
Qty: 15 G | Refills: 4 | Status: SHIPPED | OUTPATIENT
Start: 2024-06-03

## 2024-06-03 NOTE — PROGRESS NOTES
Ambulatory Visit  Name: Elena Harmon      : 1995      MRN: [id number]  Encounter Provider: KASSY Bowser  Encounter Date: 6/3/2024   Encounter department: [facility]    Assessment & Plan   1. Eczema, unspecified type  -     triamcinolone (KENALOG) 0.1 % cream; Apply topically 2 (two) times a day    Discussed with patient plan to treat with triamcinolone cream to affected areas.  Patient instructed to call if no improvement in 72 hours or symptoms worsen       History of Present Illness     29 y.o.female presenting with rash behind her ears bilaterally for the past 2 weeks. She reports that the rash is itchy and comes and goes. She also reports having an itchy feeling in her left ear and some red raised bumps on both forearms.      Review of Systems   Constitutional: Negative.    HENT: Negative.     Respiratory: Negative.     Cardiovascular: Negative.    Gastrointestinal: Negative.    Musculoskeletal: Negative.    Skin:  Positive for rash.   Neurological: Negative.    Psychiatric/Behavioral: Negative.       Past Medical History:   Diagnosis Date   • Abnormal Pap smear of cervix    • Anemia    • Crohn's colitis (HCC)    • Crohn's disease (HCC)      Past Surgical History:   Procedure Laterality Date   • COLONOSCOPY       Family History   Problem Relation Age of Onset   • Skin cancer Mother    • Hypertension Father    • Breast cancer Maternal Grandmother    • Ovarian cancer Maternal Grandmother      Social History     Tobacco Use   • Smoking status: Never     Passive exposure: Never   • Smokeless tobacco: Never   Vaping Use   • Vaping status: Never Used   Substance and Sexual Activity   • Alcohol use: Yes     Alcohol/week: 5.0 standard drinks of alcohol     Types: 4 Glasses of wine, 1 Shots of liquor per week   • Drug use: Never   • Sexual activity: Yes     Partners: Male     Birth control/protection: Condom Male     Current Outpatient Medications on File Prior to Visit  "  Medication Sig   • albuterol (ProAir HFA) 90 mcg/act inhaler Inhale 2 puffs every 6 (six) hours as needed for wheezing   • desvenlafaxine (PRISTIQ) 100 mg 24 hr tablet TAKE 1 TABLET (100 MG TOTAL) BY MOUTH DAILY   • Drospirenone 4 MG TABS Take 1 tablet by mouth daily   • inFLIXimab (Remicade) 100 mg Infuse 449 mg into a venous catheter every 28 days   • montelukast (SINGULAIR) 10 mg tablet Take 1 tablet (10 mg total) by mouth daily at bedtime   • ondansetron (ZOFRAN-ODT) 4 mg disintegrating tablet Take 1 tablet (4 mg total) by mouth every 6 (six) hours as needed for nausea or vomiting   • scopolamine (TRANSDERM-SCOP) 1 mg/3 days TD 72 hr patch Place 1 patch on the skin over 72 hours every third day   • [DISCONTINUED] benzonatate (TESSALON) 200 MG capsule Take 1 capsule (200 mg total) by mouth 3 (three) times a day as needed for cough (Patient not taking: Reported on 6/3/2024)   • [DISCONTINUED] methylPREDNISolone 4 MG tablet therapy pack Use as directed on package Do not start before March 18, 2024.   • [DISCONTINUED] promethazine-dextromethorphan (PHENERGAN-DM) 6.25-15 mg/5 mL oral syrup Take 5 mL by mouth 4 (four) times a day as needed for cough     Allergies   Allergen Reactions   • Penicillins Rash     Immunization History   Administered Date(s) Administered   • INFLUENZA 10/27/2008, 09/26/2017   • Influenza, injectable, quadrivalent, preservative free 0.5 mL 10/09/2019, 11/03/2020   • Tdap 09/26/2017     Objective     /82   Pulse 87   Temp 97.9 °F (36.6 °C)   Ht 5' 6\" (1.676 m)   Wt 108 kg (238 lb)   LMP 05/21/2024 (Exact Date)   SpO2 98%   BMI 38.41 kg/m² (Reviewed)    Physical Exam  Vitals reviewed.   Constitutional:       General: She is not in acute distress.     Appearance: She is not ill-appearing.   HENT:      Head: Normocephalic and atraumatic.      Right Ear: External ear normal.      Left Ear: External ear normal.      Nose: Nose normal.      Mouth/Throat:      Mouth: Mucous membranes " are moist.      Pharynx: Oropharynx is clear.   Eyes:      Extraocular Movements: Extraocular movements intact.      Conjunctiva/sclera: Conjunctivae normal.      Pupils: Pupils are equal, round, and reactive to light.   Cardiovascular:      Rate and Rhythm: Normal rate and regular rhythm.      Heart sounds: Normal heart sounds.   Pulmonary:      Effort: Pulmonary effort is normal.      Breath sounds: Normal breath sounds.   Abdominal:      General: Abdomen is flat. Bowel sounds are normal. There is no distension.      Palpations: Abdomen is soft.      Tenderness: There is no abdominal tenderness.   Musculoskeletal:      Cervical back: Neck supple.   Skin:     General: Skin is warm and dry.      Capillary Refill: Capillary refill takes less than 2 seconds.      Findings: Rash present. Rash is macular and papular.   Neurological:      Mental Status: She is alert and oriented to person, place, and time.   Psychiatric:         Mood and Affect: Mood normal.         Behavior: Behavior normal.       Administrative Statements {Disappearing Hyperlinks I  [facility] Service * [id number]}

## 2024-06-10 ENCOUNTER — TELEPHONE (OUTPATIENT)
Dept: GASTROENTEROLOGY | Facility: CLINIC | Age: 29
End: 2024-06-10

## 2024-06-10 DIAGNOSIS — K50.119 CROHN'S DISEASE OF COLON WITH COMPLICATION (HCC): Primary | ICD-10-CM

## 2024-06-13 ENCOUNTER — TELEPHONE (OUTPATIENT)
Age: 29
End: 2024-06-13

## 2024-06-13 RX ORDER — UPADACITINIB 45 MG/1
45 TABLET, EXTENDED RELEASE ORAL DAILY
Qty: 30 TABLET | Refills: 3 | Status: SHIPPED | OUTPATIENT
Start: 2024-06-13 | End: 2024-06-13 | Stop reason: DRUGHIGH

## 2024-06-13 RX ORDER — UPADACITINIB 45 MG/1
45 TABLET, EXTENDED RELEASE ORAL DAILY
Qty: 30 TABLET | Refills: 3 | Status: CANCELLED | OUTPATIENT
Start: 2024-06-13

## 2024-06-13 RX ORDER — UPADACITINIB 45 MG/1
45 TABLET, EXTENDED RELEASE ORAL DAILY
Qty: 28 TABLET | Refills: 2 | Status: SHIPPED | OUTPATIENT
Start: 2024-06-13

## 2024-06-13 NOTE — TELEPHONE ENCOUNTER
Called patient, reached voicemail, left message to call back to discuss medication and pregnancy.

## 2024-06-13 NOTE — TELEPHONE ENCOUNTER
Medication: Rinvoq 45mg tablets  Directions: Take 1 tablet by mouth daily for 12 weeks  Quantity: 30  Day Supply: 30  Insurance: [employer]  How Prior Auth was submitted: Monica  Authorization Date range: 4/11/2024 - 6/10/2025  Authorization Number: #[id number]  Pharmacy that fills med: [facility]  Patient aware of approval: Yes  Patient aware of pharmacy information: Yes      Letter in Media

## 2024-06-13 NOTE — TELEPHONE ENCOUNTER
Patient would like to try to fill medication with [facility].     Can you please send script for Rinvoq 45mg?    Thank you!

## 2024-06-13 NOTE — TELEPHONE ENCOUNTER
Patients GI provider:  KAREN Dow    Number to return call: [phone]    Reason for call: Fripch from [employer] insurance called in regard to Rinvoq. [facility].    Scheduled procedure/appointment date if applicable: 8/23/2024

## 2024-06-14 NOTE — TELEPHONE ENCOUNTER
Called patient, reached voicemail, left message to call back to discuss medication and pregnancy. Attempt x 2.

## 2024-06-25 DIAGNOSIS — T75.3XXA SEVERE MOTION SICKNESS, INITIAL ENCOUNTER: ICD-10-CM

## 2024-06-25 DIAGNOSIS — J32.9 CHRONIC CONGESTION OF PARANASAL SINUS: ICD-10-CM

## 2024-06-26 RX ORDER — ONDANSETRON 4 MG/1
4 TABLET, ORALLY DISINTEGRATING ORAL EVERY 6 HOURS PRN
Qty: 20 TABLET | Refills: 0 | Status: SHIPPED | OUTPATIENT
Start: 2024-06-26

## 2024-06-26 RX ORDER — SCOLOPAMINE TRANSDERMAL SYSTEM 1 MG/1
1 PATCH, EXTENDED RELEASE TRANSDERMAL
Qty: 10 PATCH | Refills: 0 | Status: SHIPPED | OUTPATIENT
Start: 2024-06-26

## 2024-06-26 RX ORDER — MONTELUKAST SODIUM 10 MG/1
10 TABLET ORAL
Qty: 90 TABLET | Refills: 1 | Status: SHIPPED | OUTPATIENT
Start: 2024-06-26

## 2024-06-26 NOTE — TELEPHONE ENCOUNTER
Please review to see if the refill is appropriate.   Ondansetron    Refill must be reviewed and completed by the office or provider. The refill is unable to be approved or denied by the medication management team.    Scoplamine - off protocol

## 2024-07-08 DIAGNOSIS — K50.119 CROHN'S DISEASE OF COLON WITH COMPLICATION (HCC): Primary | ICD-10-CM

## 2024-07-08 RX ORDER — PREDNISONE 10 MG/1
TABLET ORAL
Qty: 70 TABLET | Refills: 0 | Status: SHIPPED | OUTPATIENT
Start: 2024-07-08 | End: 2024-08-05

## 2024-07-26 ENCOUNTER — RA CDI HCC (OUTPATIENT)
Dept: OTHER | Facility: HOSPITAL | Age: 29
End: 2024-07-26

## 2024-08-02 ENCOUNTER — OFFICE VISIT (OUTPATIENT)
Dept: FAMILY MEDICINE CLINIC | Facility: CLINIC | Age: 29
End: 2024-08-02
Payer: COMMERCIAL

## 2024-08-02 VITALS
SYSTOLIC BLOOD PRESSURE: 140 MMHG | DIASTOLIC BLOOD PRESSURE: 90 MMHG | BODY MASS INDEX: 37.61 KG/M2 | WEIGHT: 234 LBS | TEMPERATURE: 97.5 F | OXYGEN SATURATION: 97 % | HEIGHT: 66 IN | HEART RATE: 94 BPM

## 2024-08-02 DIAGNOSIS — Z00.00 ANNUAL PHYSICAL EXAM: Primary | ICD-10-CM

## 2024-08-02 PROCEDURE — 99395 PREV VISIT EST AGE 18-39: CPT | Performed by: FAMILY MEDICINE

## 2024-08-02 NOTE — PROGRESS NOTES
Adult Annual Physical  Name: Elena Harmon      : 1995      MRN: [id number]  Encounter Provider: Sobia Reid DO  Encounter Date: 2024   Encounter department: [facility]    Assessment & Plan   1. Annual physical exam  -     Lipid panel; Future    Immunizations and preventive care screenings were discussed with patient today. Appropriate education was printed on patient's after visit summary.    Counseling:  Exercise: the importance of regular exercise/physical activity was discussed. Recommend exercise 3-5 times per week for at least 30 minutes.          History of Present Illness     Adult Annual Physical:  Patient presents for annual physical.     Diet and Physical Activity:  - Diet/Nutrition: well balanced diet.  - Exercise: moderate cardiovascular exercise.    General Health:  - Sleep: sleeps well.  - Hearing: normal hearing bilateral ears.  - Vision: wears glasses and goes for regular eye exams.  - Dental: regular dental visits.    /GYN Health:  - Follows with GYN: no.   - Menopause: premenopausal.   - History of STDs: no  - Contraception: oral contraceptives.      Advanced Care Planning:  - Has an advanced directive?: no    - Has a durable medical POA?: no    - ACP document given to patient?: yes      Review of Systems   Constitutional:  Negative for appetite change, chills and fever.   HENT:  Negative for ear pain, facial swelling, rhinorrhea, sinus pain, sore throat and trouble swallowing.    Eyes:  Negative for discharge and redness.   Respiratory:  Negative for chest tightness, shortness of breath and wheezing.    Cardiovascular:  Negative for chest pain and palpitations.   Gastrointestinal:  Negative for abdominal pain, diarrhea, nausea and vomiting.   Endocrine: Negative for polyuria.   Genitourinary:  Negative for dysuria and urgency.   Musculoskeletal:  Negative for arthralgias and back pain.   Skin:  Negative for rash.   Neurological:  Negative for  "dizziness, weakness and headaches.   Hematological:  Negative for adenopathy.   Psychiatric/Behavioral:  Negative for behavioral problems, confusion and sleep disturbance.    All other systems reviewed and are negative.    Pertinent Medical History   Anxiety, Crohn's disease      Objective     /90 (BP Location: Left arm, Patient Position: Sitting, Cuff Size: Large)   Pulse 94   Temp 97.5 °F (36.4 °C)   Ht 5' 6\" (1.676 m)   Wt 106 kg (234 lb)   SpO2 97%   BMI 37.77 kg/m²     Physical Exam  Vitals and nursing note reviewed.   Constitutional:       General: She is not in acute distress.     Appearance: Normal appearance. She is well-developed. She is not ill-appearing or diaphoretic.   HENT:      Head: Normocephalic and atraumatic.      Right Ear: Tympanic membrane, ear canal and external ear normal.      Left Ear: Tympanic membrane, ear canal and external ear normal.      Nose: Nose normal. No congestion or rhinorrhea.      Mouth/Throat:      Mouth: Mucous membranes are moist.      Pharynx: Oropharynx is clear. No oropharyngeal exudate or posterior oropharyngeal erythema.   Eyes:      General: No scleral icterus.        Right eye: No discharge.         Left eye: No discharge.      Extraocular Movements: Extraocular movements intact.      Conjunctiva/sclera: Conjunctivae normal.      Pupils: Pupils are equal, round, and reactive to light.   Neck:      Thyroid: No thyromegaly.      Vascular: No carotid bruit or JVD.      Trachea: No tracheal deviation.   Cardiovascular:      Rate and Rhythm: Normal rate and regular rhythm.      Pulses: Normal pulses.      Heart sounds: Normal heart sounds. No murmur heard.  Pulmonary:      Effort: Pulmonary effort is normal. No respiratory distress.      Breath sounds: Normal breath sounds. No stridor. No wheezing, rhonchi or rales.   Abdominal:      General: Abdomen is flat. Bowel sounds are normal. There is no distension.      Palpations: Abdomen is soft. There is no mass. "      Tenderness: There is no abdominal tenderness. There is no guarding or rebound.   Musculoskeletal:         General: No swelling, tenderness or deformity. Normal range of motion.      Cervical back: Normal range of motion and neck supple. No rigidity.      Right lower leg: No edema.      Left lower leg: No edema.   Lymphadenopathy:      Cervical: No cervical adenopathy.   Skin:     General: Skin is warm and dry.      Capillary Refill: Capillary refill takes less than 2 seconds.      Coloration: Skin is not jaundiced.      Findings: No bruising, erythema or rash.   Neurological:      General: No focal deficit present.      Mental Status: She is alert and oriented to person, place, and time.      Cranial Nerves: No cranial nerve deficit.      Sensory: No sensory deficit.      Motor: No abnormal muscle tone.      Coordination: Coordination normal.      Gait: Gait normal.      Deep Tendon Reflexes: Reflexes are normal and symmetric. Reflexes normal.   Psychiatric:         Mood and Affect: Mood normal.         Behavior: Behavior normal.         Thought Content: Thought content normal.         Judgment: Judgment normal.       Administrative Statements   I have spent a total time of 20 minutes in caring for this patient on the day of the visit/encounter including Diagnostic results, Prognosis, Risks and benefits of tx options, Instructions for management, Patient and family education, and Importance of tx compliance.

## 2024-08-12 ENCOUNTER — TELEPHONE (OUTPATIENT)
Dept: GASTROENTEROLOGY | Facility: CLINIC | Age: 29
End: 2024-08-12

## 2024-08-12 NOTE — TELEPHONE ENCOUNTER
Left voicemail and requested call back   Confirming Upcoming Procedure: Colon on 8/23  Physician performing: Dr. Heart  Location of procedure:  [facility]  Prep: sutab

## 2024-08-23 ENCOUNTER — ANESTHESIA (OUTPATIENT)
Dept: GASTROENTEROLOGY | Facility: HOSPITAL | Age: 29
End: 2024-08-23

## 2024-08-23 ENCOUNTER — HOSPITAL ENCOUNTER (OUTPATIENT)
Dept: GASTROENTEROLOGY | Facility: HOSPITAL | Age: 29
Setting detail: OUTPATIENT SURGERY
End: 2024-08-23
Attending: INTERNAL MEDICINE
Payer: COMMERCIAL

## 2024-08-23 ENCOUNTER — ANESTHESIA EVENT (OUTPATIENT)
Dept: GASTROENTEROLOGY | Facility: HOSPITAL | Age: 29
End: 2024-08-23

## 2024-08-23 VITALS
DIASTOLIC BLOOD PRESSURE: 102 MMHG | HEART RATE: 80 BPM | TEMPERATURE: 97.4 F | RESPIRATION RATE: 18 BRPM | SYSTOLIC BLOOD PRESSURE: 157 MMHG | OXYGEN SATURATION: 99 %

## 2024-08-23 DIAGNOSIS — D84.9 IMMUNOCOMPROMISED PATIENT (HCC): ICD-10-CM

## 2024-08-23 DIAGNOSIS — K50.119 CROHN'S DISEASE OF COLON WITH COMPLICATION (HCC): ICD-10-CM

## 2024-08-23 LAB
EXT PREGNANCY TEST URINE: NEGATIVE
EXT. CONTROL: NORMAL

## 2024-08-23 PROCEDURE — 81025 URINE PREGNANCY TEST: CPT | Performed by: ANESTHESIOLOGY

## 2024-08-23 PROCEDURE — 88342 IMHCHEM/IMCYTCHM 1ST ANTB: CPT | Performed by: PATHOLOGY

## 2024-08-23 PROCEDURE — 88305 TISSUE EXAM BY PATHOLOGIST: CPT | Performed by: PATHOLOGY

## 2024-08-23 PROCEDURE — 45380 COLONOSCOPY AND BIOPSY: CPT | Performed by: INTERNAL MEDICINE

## 2024-08-23 RX ORDER — PROPOFOL 10 MG/ML
INJECTION, EMULSION INTRAVENOUS AS NEEDED
Status: DISCONTINUED | OUTPATIENT
Start: 2024-08-23 | End: 2024-08-23

## 2024-08-23 RX ORDER — SODIUM CHLORIDE, SODIUM LACTATE, POTASSIUM CHLORIDE, CALCIUM CHLORIDE 600; 310; 30; 20 MG/100ML; MG/100ML; MG/100ML; MG/100ML
INJECTION, SOLUTION INTRAVENOUS CONTINUOUS PRN
Status: DISCONTINUED | OUTPATIENT
Start: 2024-08-23 | End: 2024-08-23

## 2024-08-23 RX ORDER — LIDOCAINE HYDROCHLORIDE 10 MG/ML
INJECTION, SOLUTION EPIDURAL; INFILTRATION; INTRACAUDAL; PERINEURAL AS NEEDED
Status: DISCONTINUED | OUTPATIENT
Start: 2024-08-23 | End: 2024-08-23

## 2024-08-23 RX ADMIN — PROPOFOL 100 MG: 10 INJECTION, EMULSION INTRAVENOUS at 08:07

## 2024-08-23 RX ADMIN — PROPOFOL 100 MG: 10 INJECTION, EMULSION INTRAVENOUS at 08:02

## 2024-08-23 RX ADMIN — PROPOFOL 50 MG: 10 INJECTION, EMULSION INTRAVENOUS at 08:00

## 2024-08-23 RX ADMIN — PROPOFOL 50 MG: 10 INJECTION, EMULSION INTRAVENOUS at 08:10

## 2024-08-23 RX ADMIN — PROPOFOL 100 MG: 10 INJECTION, EMULSION INTRAVENOUS at 08:05

## 2024-08-23 RX ADMIN — SODIUM CHLORIDE, SODIUM LACTATE, POTASSIUM CHLORIDE, AND CALCIUM CHLORIDE: .6; .31; .03; .02 INJECTION, SOLUTION INTRAVENOUS at 07:52

## 2024-08-23 RX ADMIN — LIDOCAINE HYDROCHLORIDE 50 MG: 10 INJECTION, SOLUTION EPIDURAL; INFILTRATION; INTRACAUDAL; PERINEURAL at 07:56

## 2024-08-23 RX ADMIN — PROPOFOL 150 MG: 10 INJECTION, EMULSION INTRAVENOUS at 07:56

## 2024-08-23 RX ADMIN — PROPOFOL 50 MG: 10 INJECTION, EMULSION INTRAVENOUS at 07:58

## 2024-08-23 NOTE — ANESTHESIA PREPROCEDURE EVALUATION
Procedure:  COLONOSCOPY    Relevant Problems   ANESTHESIA (within normal limits)      CARDIO   (+) Murmur   (-) Chest pain   (-) TAYLOR (dyspnea on exertion)      ENDO (within normal limits)      GI/HEPATIC (within normal limits)      /RENAL (within normal limits)      GYN (within normal limits)      HEMATOLOGY   (+) Absolute anemia   (+) Immunocompromised patient (HCC)      MUSCULOSKELETAL (within normal limits)      NEURO/PSYCH   (+) Anxiety      PULMONARY (within normal limits)        Physical Exam    Airway    Mallampati score: II  TM Distance: >3 FB  Neck ROM: full     Dental   No notable dental hx     Cardiovascular  Cardiovascular exam normal    Pulmonary  Pulmonary exam normal     Other Findings  post-pubertal.      Anesthesia Plan  ASA Score- 2     Anesthesia Type- IV sedation with anesthesia with ASA Monitors.         Additional Monitors:     Airway Plan:            Plan Factors-Exercise tolerance (METS): >4 METS.    Chart reviewed. EKG reviewed. Imaging results reviewed. Existing labs reviewed. Patient summary reviewed.    Patient is not a current smoker.              Induction-     Postoperative Plan-     Perioperative Resuscitation Plan - Level 1 - Full Code.       Informed Consent- Anesthetic plan and risks discussed with patient.  I personally reviewed this patient with the CRNA. Discussed and agreed on the Anesthesia Plan with the CRNA..    Discussed IV Sedation with General Anesthesia as backup with patient including but not limited to risk of cardiac insult, pulmonary complication, stroke, reaction to medications and death. All questions answered and consent was obtained.

## 2024-08-23 NOTE — H&P
History and Physical - [facility]  Elena Harmon 29 y.o. female MRN: [id number]                  HPI: Elena Harmon is a 29 y.o. year old female who presents for Crohn's      REVIEW OF SYSTEMS: Per the HPI, and otherwise unremarkable.    Historical Information   Past Medical History:   Diagnosis Date    Abnormal Pap smear of cervix     Allergic     Anemia     Anxiety     Crohn's colitis (HCC)     Crohn's disease (HCC)      Past Surgical History:   Procedure Laterality Date    COLONOSCOPY       Social History   Social History     Substance and Sexual Activity   Alcohol Use Yes    Alcohol/week: 5.0 standard drinks of alcohol    Types: 4 Glasses of wine, 1 Shots of liquor per week     Social History     Substance and Sexual Activity   Drug Use Never     Social History     Tobacco Use   Smoking Status Never    Passive exposure: Never   Smokeless Tobacco Never     Family History   Problem Relation Age of Onset    Skin cancer Mother     Hypertension Father     Alcohol abuse Father     Breast cancer Maternal Grandmother     Ovarian cancer Maternal Grandmother        Meds/Allergies     Not in a hospital admission.    Allergies   Allergen Reactions    Penicillins Rash       Objective     Last menstrual period 08/21/2024, not currently breastfeeding.      PHYSICAL EXAMINATION:    General Appearance:   Alert, cooperative, no distress   HEENT:  Normocephalic, atraumatic, anicteric. Neck supple, symmetrical, trachea midline.   Lungs:   Equal chest rise and unlabored breathing, normal effort, no coughing.   Cardiovascular:   No visualized JVD.   Abdomen:   No abdominal distension.   Skin:   No jaundice, rashes, or lesions.    Musculoskeletal:   Normal range of motion visualized.   Psych:  Normal affect and normal insight.   Neuro:  Alert and appropriate.           ASSESSMENT/PLAN:  This is a 29 y.o. year old female here for colonoscopy, and she is stable and optimized for her procedure.

## 2024-08-23 NOTE — ANESTHESIA POSTPROCEDURE EVALUATION
Post-Op Assessment Note    CV Status:  Stable  Pain Score: 0    Pain management: adequate       Mental Status:  Alert and awake   Hydration Status:  Euvolemic   PONV Controlled:  Controlled   Airway Patency:  Patent     Post Op Vitals Reviewed: Yes    No anethesia notable event occurred.    Staff: LIZ           /91 (08/23/24 0822)    Temp (!) 97.4 °F (36.3 °C) (08/23/24 0822)    Pulse 86 (08/23/24 0822)   Resp 18 (08/23/24 0822)    SpO2 99 % (08/23/24 0822)

## 2024-08-26 DIAGNOSIS — F41.9 ANXIETY: ICD-10-CM

## 2024-08-26 PROCEDURE — 88342 IMHCHEM/IMCYTCHM 1ST ANTB: CPT | Performed by: PATHOLOGY

## 2024-08-26 PROCEDURE — 88305 TISSUE EXAM BY PATHOLOGIST: CPT | Performed by: PATHOLOGY

## 2024-08-27 RX ORDER — DESVENLAFAXINE 100 MG/1
100 TABLET, EXTENDED RELEASE ORAL DAILY
Qty: 30 TABLET | Refills: 5 | Status: SHIPPED | OUTPATIENT
Start: 2024-08-27

## 2024-09-03 ENCOUNTER — TELEPHONE (OUTPATIENT)
Age: 29
End: 2024-09-03

## 2024-09-03 NOTE — TELEPHONE ENCOUNTER
Patients GI provider:  KAREN Abad    Number to return call: [phone]    Reason for call: Enid from [facility] calling to advise pt's plan has a locked pharmacy for Rinvoq, can only be filled at [facility] [phone]    Scheduled procedure/appointment date if applicable: Apt 10/28/2024

## 2024-09-05 DIAGNOSIS — K50.119 CROHN'S DISEASE OF COLON WITH COMPLICATION (HCC): ICD-10-CM

## 2024-09-05 RX ORDER — UPADACITINIB 45 MG/1
45 TABLET, EXTENDED RELEASE ORAL DAILY
Qty: 28 TABLET | Refills: 2 | Status: SHIPPED | OUTPATIENT
Start: 2024-09-05

## 2024-09-10 ENCOUNTER — ANNUAL EXAM (OUTPATIENT)
Dept: GYNECOLOGY | Facility: CLINIC | Age: 29
End: 2024-09-10
Payer: COMMERCIAL

## 2024-09-10 VITALS
BODY MASS INDEX: 38.57 KG/M2 | DIASTOLIC BLOOD PRESSURE: 82 MMHG | WEIGHT: 240 LBS | SYSTOLIC BLOOD PRESSURE: 140 MMHG | HEIGHT: 66 IN

## 2024-09-10 DIAGNOSIS — Z01.419 ROUTINE GYNECOLOGICAL EXAMINATION: ICD-10-CM

## 2024-09-10 DIAGNOSIS — Z30.011 ENCOUNTER FOR INITIAL PRESCRIPTION OF CONTRACEPTIVE PILLS: ICD-10-CM

## 2024-09-10 DIAGNOSIS — Z12.4 CERVICAL CANCER SCREENING: ICD-10-CM

## 2024-09-10 DIAGNOSIS — Z01.419 ENCOUNTER FOR WELL WOMAN EXAM: Primary | ICD-10-CM

## 2024-09-10 DIAGNOSIS — Z12.39 ENCOUNTER FOR SCREENING BREAST EXAMINATION: ICD-10-CM

## 2024-09-10 PROCEDURE — S0612 ANNUAL GYNECOLOGICAL EXAMINA: HCPCS | Performed by: PHYSICIAN ASSISTANT

## 2024-09-10 PROCEDURE — G0145 SCR C/V CYTO,THINLAYER,RESCR: HCPCS | Performed by: PHYSICIAN ASSISTANT

## 2024-09-12 ENCOUNTER — TELEPHONE (OUTPATIENT)
Age: 29
End: 2024-09-12

## 2024-09-12 NOTE — TELEPHONE ENCOUNTER
Jackie from [facility] called to confirm that we received a plan of care that needed to be signed. I advised Jackie that I don't see anything faxed over. Jackie will refax form.

## 2024-09-13 NOTE — PROGRESS NOTES
Assessment/Plan:      Diagnoses and all orders for this visit:    Encounter for well woman exam    Encounter for screening breast examination    Cervical cancer screening    Routine gynecological examination  -     Liquid-based pap, screening  -     Molecular Hold Sample    Encounter for initial prescription of contraceptive pills  -     Drospirenone 4 MG TABS; Take 1 tablet by mouth daily          Subjective:     Patient ID: Elena Harmon is a 29 y.o. female.    Pt presents for her annual exam today--  She has no complaints  She has regular bleeding  no pelvic pain  On Slynd  Bowel and bladder are regular  No breast concerns today    pap today.    Rx slynd  Daily mvi        Review of Systems   Constitutional:  Negative for chills, fever and unexpected weight change.   HENT:  Negative for ear pain and sore throat.    Eyes:  Negative for pain and visual disturbance.   Respiratory:  Negative for cough and shortness of breath.    Cardiovascular:  Negative for chest pain and palpitations.   Gastrointestinal:  Negative for abdominal pain, blood in stool, constipation, diarrhea and vomiting.   Genitourinary: Negative.  Negative for dysuria and hematuria.   Musculoskeletal:  Negative for arthralgias and back pain.   Skin:  Negative for color change and rash.   Neurological:  Negative for seizures and syncope.   All other systems reviewed and are negative.        Objective:     Physical Exam  Vitals and nursing note reviewed.   Constitutional:       Appearance: Normal appearance. She is well-developed.   HENT:      Head: Normocephalic and atraumatic.   Chest:   Breasts:     Right: No inverted nipple, mass, nipple discharge or skin change.      Left: No inverted nipple, mass, nipple discharge or skin change.   Abdominal:      Palpations: Abdomen is soft.   Genitourinary:     Exam position: Supine.      Labia:         Right: No rash, tenderness or lesion.         Left: No rash, tenderness or lesion.       Vagina: Normal.       Cervix: No cervical motion tenderness, discharge or friability.      Adnexa:         Right: No mass, tenderness or fullness.          Left: No mass, tenderness or fullness.     Musculoskeletal:      Cervical back: Normal range of motion.   Lymphadenopathy:      Lower Body: No right inguinal adenopathy. No left inguinal adenopathy.   Neurological:      Mental Status: She is alert.

## 2024-09-16 ENCOUNTER — PATIENT MESSAGE (OUTPATIENT)
Dept: FAMILY MEDICINE CLINIC | Facility: CLINIC | Age: 29
End: 2024-09-16

## 2024-09-18 LAB
LAB AP GYN PRIMARY INTERPRETATION: NORMAL
Lab: NORMAL

## 2024-09-19 ENCOUNTER — RA CDI HCC (OUTPATIENT)
Dept: OTHER | Facility: HOSPITAL | Age: 29
End: 2024-09-19

## 2024-10-01 ENCOUNTER — OFFICE VISIT (OUTPATIENT)
Dept: FAMILY MEDICINE CLINIC | Facility: CLINIC | Age: 29
End: 2024-10-01
Payer: COMMERCIAL

## 2024-10-01 VITALS — WEIGHT: 245.4 LBS | BODY MASS INDEX: 39.61 KG/M2

## 2024-10-01 DIAGNOSIS — E66.812 CLASS 2 OBESITY DUE TO EXCESS CALORIES WITHOUT SERIOUS COMORBIDITY WITH BODY MASS INDEX (BMI) OF 39.0 TO 39.9 IN ADULT: Primary | ICD-10-CM

## 2024-10-01 DIAGNOSIS — E66.09 CLASS 2 OBESITY DUE TO EXCESS CALORIES WITHOUT SERIOUS COMORBIDITY WITH BODY MASS INDEX (BMI) OF 39.0 TO 39.9 IN ADULT: Primary | ICD-10-CM

## 2024-10-01 PROCEDURE — 99213 OFFICE O/P EST LOW 20 MIN: CPT | Performed by: FAMILY MEDICINE

## 2024-10-02 ENCOUNTER — TELEPHONE (OUTPATIENT)
Age: 29
End: 2024-10-02

## 2024-10-02 DIAGNOSIS — E66.812 CLASS 2 OBESITY WITHOUT SERIOUS COMORBIDITY WITH BODY MASS INDEX (BMI) OF 39.0 TO 39.9 IN ADULT, UNSPECIFIED OBESITY TYPE: Primary | ICD-10-CM

## 2024-10-02 NOTE — TELEPHONE ENCOUNTER
Message sent via [employer].   Hi Dr Reid, my pharmacy received the script but they let me know it needs prior authorization. Is there anything I need to do to get that process started?      Thanks!

## 2024-10-07 NOTE — TELEPHONE ENCOUNTER
PA Naltrexone-buPROPion HCl ER 8-90 MG SUBMITTED     via    []CMM-KEY:    []Surescripts-Case ID #    [x]Availity-Auth ID # [id number]  NDC #    []Faxed to plan   []Other website    []Phone call Case ID #      Office notes sent, clinical questions answered. Awaiting determination    Turnaround time for your insurance to make a decision on your Prior Authorization can take 7-21 business days.

## 2024-10-08 NOTE — PROGRESS NOTES
Patient ID: Elena Harmon is a 29 y.o. female.    HPI: 29 y.o.female presents for evaluation of options for weight loss.  She has been trying diet modification and exercise without significant improvement in terms of weight loss.  We discussed in detail today all of her options are available to her.  She feels that often she snacks and her portion size sometimes is not what it should be but states overall she does not eat badly.  After reviewing all the options she has opted to try Contrave therapy.    SUBJECTIVE    Family History   Problem Relation Age of Onset    Skin cancer Mother     Hypertension Father     Alcohol abuse Father     Breast cancer Maternal Grandmother     Ovarian cancer Maternal Grandmother      Social History     Socioeconomic History    Marital status: Single     Spouse name: Not on file    Number of children: Not on file    Years of education: Not on file    Highest education level: Not on file   Occupational History    Not on file   Tobacco Use    Smoking status: Never     Passive exposure: Never    Smokeless tobacco: Never   Vaping Use    Vaping status: Never Used   Substance and Sexual Activity    Alcohol use: Yes     Alcohol/week: 5.0 standard drinks of alcohol     Types: 4 Glasses of wine, 1 Shots of liquor per week    Drug use: Never    Sexual activity: Yes     Partners: Male     Birth control/protection: OCP   Other Topics Concern    Not on file   Social History Narrative    Not on file     Social Determinants of Health     Financial Resource Strain: Not on file   Food Insecurity: Not on file   Transportation Needs: Not on file   Physical Activity: Not on file   Stress: Not on file   Social Connections: Not on file   Intimate Partner Violence: Not on file   Housing Stability: Not on file     Past Medical History:   Diagnosis Date    Abnormal Pap smear of cervix     Allergic     Anemia     Anxiety     Crohn's colitis (HCC)     Crohn's disease (HCC)      Past Surgical History:   Procedure  Laterality Date    COLONOSCOPY       Allergies   Allergen Reactions    Penicillins Rash       Current Outpatient Medications:     albuterol (ProAir HFA) 90 mcg/act inhaler, Inhale 2 puffs every 6 (six) hours as needed for wheezing, Disp: 8.5 g, Rfl: 0    desvenlafaxine (PRISTIQ) 100 mg 24 hr tablet, TAKE 1 TABLET (100 MG TOTAL) BY MOUTH DAILY, Disp: 30 tablet, Rfl: 5    Drospirenone 4 MG TABS, Take 1 tablet by mouth daily, Disp: 84 tablet, Rfl: 4    montelukast (SINGULAIR) 10 mg tablet, Take 1 tablet (10 mg total) by mouth daily at bedtime, Disp: 90 tablet, Rfl: 1    Naltrexone-buPROPion HCl ER 8-90 MG TB12, Take 1 tab bid for one week, then increase to 2 po bid thereafter, Disp: 120 tablet, Rfl: 2    ondansetron (ZOFRAN-ODT) 4 mg disintegrating tablet, Take 1 tablet (4 mg total) by mouth every 6 (six) hours as needed for nausea or vomiting, Disp: 20 tablet, Rfl: 0    scopolamine (TRANSDERM-SCOP) 1 mg/3 days TD 72 hr patch, Place 1 patch on the skin over 72 hours every third day, Disp: 10 patch, Rfl: 0    triamcinolone (KENALOG) 0.1 % cream, Apply topically 2 (two) times a day, Disp: 15 g, Rfl: 4    Upadacitinib ER (Rinvoq) 45 MG TB24, Take 45 mg by mouth daily Induction dosing, Disp: 28 tablet, Rfl: 2    Review of Systems  Constitutional:     Denies fever, chills ,fatigue ,weakness ,weight loss, positive weight gain and difficulty taking weight off of diet and exercise  ENT: Denies earache ,loss of hearing ,nosebleed, nasal discharge,nasal congestion ,sore throat ,hoarseness  Pulmonary: Denies shortness of breath ,cough  ,dyspnea on exertion, orthopnea  ,PND   Cardiovascular:  Denies bradycardia , tachycardia  ,palpations, lower extremity edema leg, claudication  Breast:  Denies new or changing breast lumps ,nipple discharge ,nipple changes  Abdomen:  Denies abdominal pain , anorexia , indigestion, nausea, vomiting, constipation, diarrhea  Musculoskeletal: Denies myalgias, arthralgias, joint swelling, joint  stiffness , limb pain, limb swelling  Gu: denies dysuria, polyuria  Skin: Denies skin rash, skin lesion, skin wound, itching, dry skin  Neuro: Denies headache, numbness, tingling, confusion, loss of consciousness, dizziness, vertigo  Psychiatric: Denies feelings of depression, suicidal ideation, anxiety, sleep disturbances    OBJECTIVE  Wt 111 kg (245 lb 6.4 oz)   BMI 39.61 kg/m²   Constitutional:   NAD, well appearing and well nourished      ENT:   Conjunctiva and lids: no injection, edema, or discharge     Pupils and iris: HARDIK bilaterally    External inspection of ears and nose: normal without deformities or discharge.      Otoscopic exam: Canals patent without erythema.       Nasal mucosa, septum and turbinates: Normal or edema or discharge         Oropharynx:  Moist mucosa, normal tongue and tonsils without lesions. No erythema        Pulmonary:Respiratory effort normal rate and rhythm, no increased work of breathing. Auscultation of lungs:  Clear bilaterally with no adventitious breath sounds       Cardiovascular: regular rate and rhythm, S1 and S2, no murmur, no edema and/or varicosities of LE      Abdomen: Soft and non-distended     Positive bowel sounds      No heptomegaly or splenomegaly      Gu: no suprapubic tenderness or CVA tenderness, no urethral discharge  Lymphatic:  No anterior or posterior cervical lymphadenopathy         Musculoskeletal:  Gait and station: Normal gait      Digits and nails normal without clubbing or cyanosis       Inspection/palpation of joints, bones, and muscles:  No joint tenderness, swelling, full active and passive range of motion       Skin: Normal skin turgor and no rashes      Neuro:     Normal reflexes     Psych:   alert and oriented to person, place and time     normal mood and affect       Assessment/Plan:Diagnoses and all orders for this visit:    Class 2 obesity due to excess calories without serious comorbidity with body mass index (BMI) of 39.0 to 39.9 in  adult        Reviewed with patient plan to treat with above plan.  Patient instructed to call in 72 hours if not feeling better or if symptoms worsen

## 2024-10-09 NOTE — TELEPHONE ENCOUNTER
PA Naltrexone-buPROPion HCl ER 8-90 MG RESUBMITTED     via    []CMM-KEY:    []Surescripts-Case ID #    [x]Availity-Auth ID # [id number] NDC # [id number]  []Faxed to plan   []Other website    []Phone call Case ID #      Office notes sent, clinical questions answered. Awaiting determination    Turnaround time for your insurance to make a decision on your Prior Authorization can take 7-21 business days.

## 2024-10-11 ENCOUNTER — TRANSCRIBE ORDERS (OUTPATIENT)
Dept: GASTROENTEROLOGY | Facility: CLINIC | Age: 29
End: 2024-10-11

## 2024-10-14 ENCOUNTER — TELEPHONE (OUTPATIENT)
Age: 29
End: 2024-10-14

## 2024-10-14 NOTE — TELEPHONE ENCOUNTER
Patients GI provider:  Dr. Heart    Number to return call: [phone]    Reason for call: Staff from [facility] called in to confirm that we received the pre auth request for the Rinvoq. Advised that the request from Cover My Meds was received and scanned into pts chart. No further questions.     Scheduled procedure/appointment date if applicable: Appt 10/28/24

## 2024-10-21 NOTE — TELEPHONE ENCOUNTER
Patient contacted her speciality pharmacy, [facility], as she was due for a refill but never received it. Was informed that a prior authorization is needed for the Rinvoq. Review of chart shows that previously a prior authorization was received for the Rinvoq that is valid 4/11/24 - 6/10/25. Unsure if new prior authorization is needed as the pharmacy has been changed per patients insurance. Patient is asking for an update as she now has been out of medication for a few days. Please contact patient if a new prior authorization is needed, if so, that it was submitted or if the old prior authorization is still valid and needs to be sent to [facility].

## 2024-10-21 NOTE — TELEPHONE ENCOUNTER
Patient called asking about PA for Karleyradha   Provided her PA # from June to call them and ask why she needs a new auth.    Please call her to advise on a new PA  [phone]

## 2024-10-22 ENCOUNTER — TELEPHONE (OUTPATIENT)
Age: 29
End: 2024-10-22

## 2024-10-22 NOTE — TELEPHONE ENCOUNTER
Beata calling asking for new PA, her insurance has a limit of 84 tabs for the year for the  Rinvoq 45 mg tabs, PA is not just for the drug itself but needs to exceed the quantity limit per insurance, my need to offer additional clinical information to support continued use of 45 mg tabs in order to get covered,  Cover my meds key: [id number]    Call Beata [phone] option 4

## 2024-10-24 NOTE — TELEPHONE ENCOUNTER
Pt calling to see how much longer for prior authorization is approved. Pt states she has been out of medication and stating to have symptoms. Pt refused triage nurse, but requesting a call back for update, Please.

## 2024-10-24 NOTE — TELEPHONE ENCOUNTER
Called pt's insurance; spoke with Diamond who stated authorization is still pending.  I stressed to her the urgency and she is expediting it.

## 2024-10-24 NOTE — TELEPHONE ENCOUNTER
I contacted pt's insurance; spoke with Jose G who stated the authorization is still pending.  He advised me to call back later this afternoon since determination may be in today.  I will call again this afternoon and send another update.

## 2024-10-25 ENCOUNTER — TELEPHONE (OUTPATIENT)
Age: 29
End: 2024-10-25

## 2024-10-25 NOTE — TELEPHONE ENCOUNTER
Pt has called the office to speak with the PA team regarding her pending approval for her med,please call pt back as soon as you can.

## 2024-10-25 NOTE — TELEPHONE ENCOUNTER
Patients GI provider:       Number to return call: [phone]    Reason for call: Pt calling stating she is returning a call from [employer] for a prior auth.     Scheduled procedure/appointment date if applicable: Apt/procedure

## 2024-10-25 NOTE — TELEPHONE ENCOUNTER
Contacted the pt's insurance for authorization status; spoke with Nika who stated the authorization has been denied due to quantity limit has been exceeded for maintenance therapy.    She stated she is unable to fax the denial letter.    I contacted the pharmacy appeals department number ([phone]) spoke with Arabella who stated an appeals letter can be faxed with the maintenance dose for the patient.   Fax # [phone]

## 2024-10-25 NOTE — TELEPHONE ENCOUNTER
Called [facility]ata; spoke to Jackie to make her aware.    Called pt; lvm to contact the office to give her the update of the authorization.

## 2024-10-25 NOTE — TELEPHONE ENCOUNTER
Pt called for her medication stated having symptoms now again but missed the medication ER (Rinvoq) 45 MG for 7 days requesting to speak to some one call was transferred to Allyn for further assistance in off

## 2024-10-28 ENCOUNTER — OFFICE VISIT (OUTPATIENT)
Age: 29
End: 2024-10-28
Payer: COMMERCIAL

## 2024-10-28 ENCOUNTER — TELEPHONE (OUTPATIENT)
Age: 29
End: 2024-10-28

## 2024-10-28 VITALS
OXYGEN SATURATION: 99 % | TEMPERATURE: 98.7 F | HEART RATE: 87 BPM | SYSTOLIC BLOOD PRESSURE: 142 MMHG | HEIGHT: 66 IN | DIASTOLIC BLOOD PRESSURE: 92 MMHG | BODY MASS INDEX: 36.96 KG/M2 | WEIGHT: 230 LBS

## 2024-10-28 DIAGNOSIS — E66.9 OBESITY (BMI 30-39.9): ICD-10-CM

## 2024-10-28 DIAGNOSIS — D50.9 IRON DEFICIENCY ANEMIA, UNSPECIFIED IRON DEFICIENCY ANEMIA TYPE: ICD-10-CM

## 2024-10-28 DIAGNOSIS — K50.119 CROHN'S DISEASE OF COLON WITH COMPLICATION (HCC): Primary | ICD-10-CM

## 2024-10-28 DIAGNOSIS — D84.9 IMMUNOCOMPROMISED STATE (HCC): ICD-10-CM

## 2024-10-28 DIAGNOSIS — R79.89 LOW VITAMIN B12 LEVEL: ICD-10-CM

## 2024-10-28 DIAGNOSIS — D84.9 IMMUNOCOMPROMISED PATIENT (HCC): ICD-10-CM

## 2024-10-28 DIAGNOSIS — K76.0 FATTY LIVER: ICD-10-CM

## 2024-10-28 PROCEDURE — 99214 OFFICE O/P EST MOD 30 MIN: CPT | Performed by: INTERNAL MEDICINE

## 2024-10-28 RX ORDER — UPADACITINIB 30 MG/1
30 TABLET, EXTENDED RELEASE ORAL DAILY
Qty: 30 TABLET | Refills: 11 | Status: SHIPPED | OUTPATIENT
Start: 2024-10-28

## 2024-10-28 NOTE — TELEPHONE ENCOUNTER
Pt's Rinvoq 30mg once daily has been approved   Auth # [id number]     Please sent script to [facility] pharmacy on file.      Thank you.

## 2024-10-28 NOTE — ASSESSMENT & PLAN NOTE
Continue to monitor  Orders:    CBC and differential; Future    C-reactive protein; Future    Comprehensive metabolic panel; Future    Vitamin B12; Future    Vitamin D 25 hydroxy; Future    Iron Panel (Includes Ferritin, Iron Sat%, Iron, and TIBC); Future

## 2024-10-28 NOTE — ASSESSMENT & PLAN NOTE
Avoid live virus vaccines  Yearly flu shot  COVID vaccine and booster  Pneumonia vaccine  Shingrix  Routine skin exams with the dermatologist  Routine Pap smears

## 2024-10-28 NOTE — PROGRESS NOTES
Ambulatory Visit  Name: Elena Harmon      : 1995      MRN: [id number]  Encounter Provider: Alondra Heart MD  Encounter Date: 10/28/2024   Encounter department: [facility]            Assessment & Plan  Crohn's disease of colon with complication (HCC)    The patient is a 29-year-old woman with ileocolonic Crohn's disease diagnosed in  previously on Humira but without adequate levels despite twice weekly dosing and subsequently switched to Remicade for which she did well with induction but then had a flare of symptoms and low infliximab level, most recently on Rinvoq who now presents for follow-up.    She was doing very well on Rinvoq but now off the medicine and she feels she is flaring.     Most recent colonoscopy from 2024 notable for some erythema with granular and ulcerated mucosa in the rectum but otherwise normal-appearing colon.  Biopsies with benign terminal ileum, active chronic colitis at 80 and 70 cm, at 50 cm, at 20 and 10 cm, and chronic quiescent colitis at 30 cm.  CMV negative.    MR enterography from 2022 with no evidence of active IBD but chronic changes of IBD involving the distalmost 30 cm of terminal ileum and the entirety of the large bowel.  Moderate hepatic steatosis at that time.    Echocardiogram from 2020 with EF of 55%.    Most recent CMP normal.  Iron studies with low iron saturation.  Vitamin D low and B12 normal.  CBC normal.  CRP elevated.    Complete Rinvoq 30 mg maintenance dosing  Repeat blood work ordered today  Next quant gold and hepatitis panel May 2025    Next colonoscopy 2026  Next enterography ordered today    Zofran as needed for nausea  Scopolamine patch as needed for nausea    Obtain elastography.    Avoid live virus vaccines  Yearly flu shot  COVID vaccine and booster  Pneumonia vaccine  Shingrix  Routine skin exams with the dermatologist  Routine Pap smears    Orders:    MRI  enterography w wo; Future    CBC and differential; Future    C-reactive protein; Future    Comprehensive metabolic panel; Future    Vitamin B12; Future    Vitamin D 25 hydroxy; Future    Iron Panel (Includes Ferritin, Iron Sat%, Iron, and TIBC); Future    Immunocompromised patient (HCC)    Avoid live virus vaccines  Yearly flu shot  COVID vaccine and booster  Pneumonia vaccine  Shingrix  Routine skin exams with the dermatologist  Routine Pap smears       Low vitamin B12 level  Continue to monitor  Orders:    CBC and differential; Future    C-reactive protein; Future    Comprehensive metabolic panel; Future    Vitamin B12; Future    Vitamin D 25 hydroxy; Future    Iron Panel (Includes Ferritin, Iron Sat%, Iron, and TIBC); Future    Immunocompromised state (HCC)    Avoid live virus vaccines  Yearly flu shot  COVID vaccine and booster  Pneumonia vaccine  Shingrix  Routine skin exams with the dermatologist  Routine Pap smears  Orders:    CBC and differential; Future    C-reactive protein; Future    Comprehensive metabolic panel; Future    Vitamin B12; Future    Vitamin D 25 hydroxy; Future    Iron Panel (Includes Ferritin, Iron Sat%, Iron, and TIBC); Future    Iron deficiency anemia, unspecified iron deficiency anemia type  Continue to monitor  Orders:    CBC and differential; Future    C-reactive protein; Future    Comprehensive metabolic panel; Future    Vitamin B12; Future    Vitamin D 25 hydroxy; Future    Iron Panel (Includes Ferritin, Iron Sat%, Iron, and TIBC); Future    Fatty liver    Orders:    US elastography; Future    Ambulatory Referral to Weight Management; Future    CBC and differential; Future    C-reactive protein; Future    Comprehensive metabolic panel; Future    Vitamin B12; Future    Vitamin D 25 hydroxy; Future    Iron Panel (Includes Ferritin, Iron Sat%, Iron, and TIBC); Future    Obesity (BMI 30-39.9)    Orders:    Ambulatory Referral to Weight Management; Future      History of Present Illness      Elena Harmon is a 29 y.o. female who presents for Crohn's.     She had an issue with her Rinvoq and the approval. Her Rinvoq was denied.   She was feeling good on the medication.   On the medicine 2 Bms per day, but now formed and symptoms returning off the medications. She sees blood off the medicine but not on the medicine. Less formed off the Rinvoq. Prior it was find. No abdominal pain.   Sokjme unintentional weight loss of 11 lbs. Part of it was intentional. Appetite is so so.     Answers submitted by the patient for this visit:  Inflammatory Bowel Disease (Submitted on 10/28/2024)  When you are not experiencing symptoms of your inflammatory bowel disease, how many bowel movements do you typically have each day?: 2  What is the average (typical) number of bowel movements that you had in a single day during the last week?: 4  Over the last 3 days, have you had any bowel movements where you passed blood without stool?: Yes  Since your last visit, have you received any vaccinations?: Yes  Since the last visit, have you had an infection?: No  Is there any possibility that you may be pregnant?: No  In the past three months, have you used tobacco in any form?: No  During the last year, how many days have you missed work or school because of your inflammatory bowel disease?: 1  During the last year, how many days have you been hospitalized because of your inflammatory bowel disease?: 0  During the last year, how many days have you visited a hospital emergency department because of your inflammatory bowel disease?: 0  During the last month, have you taken narcotic pain medications (such as Percocet, oxycodone, Oxycontin, morphine, Vicodin, Dilaudid, MS Contin) for your inflammatory bowel disease?: No  Have you awoken at night because you needed to move your bowels during the last month? : Yes  Have you had leakage of stool while sleeping during the last month?: No  Have you had leakage of stool while you were  awake during the last month?: No  In the last 6 months, have you unintentionally lost weight?: Yes  Fever: No  Eye irritation: No  Numbness or tingling in your hands or feet: No  Bruising or bleeding: No  Felt depressed or blue: No      History obtained from : patient  Review of Systems   HENT:  Negative for mouth sores and sore throat.    Respiratory:  Negative for shortness of breath.    Cardiovascular:  Negative for chest pain.   Musculoskeletal:  Positive for arthralgias.   Skin:  Negative for rash.     Pertinent Medical History     Medical History Reviewed by provider this encounter:       Past Medical History   Past Medical History:   Diagnosis Date    Abnormal Pap smear of cervix     Allergic     Anemia     Anxiety     Crohn's colitis (HCC)     Crohn's disease (HCC)      Past Surgical History:   Procedure Laterality Date    COLONOSCOPY       Family History   Problem Relation Age of Onset    Skin cancer Mother     Hypertension Father     Alcohol abuse Father     Breast cancer Maternal Grandmother     Ovarian cancer Maternal Grandmother      Current Outpatient Medications on File Prior to Visit   Medication Sig Dispense Refill    albuterol (ProAir HFA) 90 mcg/act inhaler Inhale 2 puffs every 6 (six) hours as needed for wheezing 8.5 g 0    desvenlafaxine (PRISTIQ) 100 mg 24 hr tablet TAKE 1 TABLET (100 MG TOTAL) BY MOUTH DAILY 30 tablet 5    Drospirenone 4 MG TABS Take 1 tablet by mouth daily 84 tablet 4    montelukast (SINGULAIR) 10 mg tablet Take 1 tablet (10 mg total) by mouth daily at bedtime 90 tablet 1    ondansetron (ZOFRAN-ODT) 4 mg disintegrating tablet Take 1 tablet (4 mg total) by mouth every 6 (six) hours as needed for nausea or vomiting 20 tablet 0    scopolamine (TRANSDERM-SCOP) 1 mg/3 days TD 72 hr patch Place 1 patch on the skin over 72 hours every third day 10 patch 0    Naltrexone-buPROPion HCl ER 8-90 MG TB12 Take 1 tab bid for one week, then increase to 2 po bid thereafter (Patient not  taking: Reported on 10/28/2024) 120 tablet 2    triamcinolone (KENALOG) 0.1 % cream Apply topically 2 (two) times a day (Patient not taking: Reported on 10/28/2024) 15 g 4    Upadacitinib ER (Rinvoq) 45 MG TB24 Take 45 mg by mouth daily Induction dosing (Patient not taking: Reported on 10/28/2024) 28 tablet 2     No current facility-administered medications on file prior to visit.     Allergies   Allergen Reactions    Penicillins Rash      Current Outpatient Medications on File Prior to Visit   Medication Sig Dispense Refill    albuterol (ProAir HFA) 90 mcg/act inhaler Inhale 2 puffs every 6 (six) hours as needed for wheezing 8.5 g 0    desvenlafaxine (PRISTIQ) 100 mg 24 hr tablet TAKE 1 TABLET (100 MG TOTAL) BY MOUTH DAILY 30 tablet 5    Drospirenone 4 MG TABS Take 1 tablet by mouth daily 84 tablet 4    montelukast (SINGULAIR) 10 mg tablet Take 1 tablet (10 mg total) by mouth daily at bedtime 90 tablet 1    ondansetron (ZOFRAN-ODT) 4 mg disintegrating tablet Take 1 tablet (4 mg total) by mouth every 6 (six) hours as needed for nausea or vomiting 20 tablet 0    scopolamine (TRANSDERM-SCOP) 1 mg/3 days TD 72 hr patch Place 1 patch on the skin over 72 hours every third day 10 patch 0    Naltrexone-buPROPion HCl ER 8-90 MG TB12 Take 1 tab bid for one week, then increase to 2 po bid thereafter (Patient not taking: Reported on 10/28/2024) 120 tablet 2    triamcinolone (KENALOG) 0.1 % cream Apply topically 2 (two) times a day (Patient not taking: Reported on 10/28/2024) 15 g 4    Upadacitinib ER (Rinvoq) 45 MG TB24 Take 45 mg by mouth daily Induction dosing (Patient not taking: Reported on 10/28/2024) 28 tablet 2     No current facility-administered medications on file prior to visit.      Social History     Tobacco Use    Smoking status: Never     Passive exposure: Never    Smokeless tobacco: Never   Vaping Use    Vaping status: Never Used   Substance and Sexual Activity    Alcohol use: Yes     Alcohol/week: 4.0 standard  "drinks of alcohol     Types: 4 Glasses of wine per week    Drug use: Never    Sexual activity: Yes     Partners: Male     Birth control/protection: OCP         Objective     /92 (BP Location: Left arm, Patient Position: Sitting, Cuff Size: Large)   Pulse 87   Temp 98.7 °F (37.1 °C) (Tympanic)   Ht 5' 6\" (1.676 m)   Wt 104 kg (230 lb)   SpO2 99%   BMI 37.12 kg/m²     PHYSICAL EXAMINATION:    General Appearance:   Alert, cooperative, no distress   HEENT:  Normocephalic, atraumatic, anicteric. Neck supple, symmetrical, trachea midline.   Lungs:   Equal chest rise and unlabored breathing, normal effort, no coughing.   Cardiovascular:   No visualized JVD.   Abdomen:   No abdominal distension.   Skin:   No jaundice, rashes, or lesions.    Musculoskeletal:   Normal range of motion visualized.   Psych:  Normal affect and normal insight.   Neuro:  Alert and appropriate.       Administrative Statements   I have spent a total time of 25 minutes in caring for this patient on the day of the visit/encounter including Diagnostic results, Prognosis, Risks and benefits of tx options, Instructions for management, Patient and family education, Importance of tx compliance, Risk factor reductions, Impressions, Counseling / Coordination of care, Documenting in the medical record, Reviewing / ordering tests, medicine, procedures  , and Obtaining or reviewing history  .  "

## 2024-10-28 NOTE — ASSESSMENT & PLAN NOTE
The patient is a 29-year-old woman with ileocolonic Crohn's disease diagnosed in 2019 previously on Humira but without adequate levels despite twice weekly dosing and subsequently switched to Remicade for which she did well with induction but then had a flare of symptoms and low infliximab level, most recently on Rinvoq who now presents for follow-up.    She was doing very well on Rinvoq but now off the medicine and she feels she is flaring.     Most recent colonoscopy from August 2024 notable for some erythema with granular and ulcerated mucosa in the rectum but otherwise normal-appearing colon.  Biopsies with benign terminal ileum, active chronic colitis at 80 and 70 cm, at 50 cm, at 20 and 10 cm, and chronic quiescent colitis at 30 cm.  CMV negative.    MR enterography from November 2022 with no evidence of active IBD but chronic changes of IBD involving the distalmost 30 cm of terminal ileum and the entirety of the large bowel.  Moderate hepatic steatosis at that time.    Echocardiogram from October 2020 with EF of 55%.    Most recent CMP normal.  Iron studies with low iron saturation.  Vitamin D low and B12 normal.  CBC normal.  CRP elevated.    Complete Rinvoq 30 mg maintenance dosing  Repeat blood work ordered today  Next quant gold and hepatitis panel May 2025    Next colonoscopy August 2026  Next enterography ordered today    Zofran as needed for nausea  Scopolamine patch as needed for nausea    Obtain elastography.    Avoid live virus vaccines  Yearly flu shot  COVID vaccine and booster  Pneumonia vaccine  Shingrix  Routine skin exams with the dermatologist  Routine Pap smears    Orders:    MRI enterography w wo; Future    CBC and differential; Future    C-reactive protein; Future    Comprehensive metabolic panel; Future    Vitamin B12; Future    Vitamin D 25 hydroxy; Future    Iron Panel (Includes Ferritin, Iron Sat%, Iron, and TIBC); Future

## 2024-11-07 ENCOUNTER — TELEPHONE (OUTPATIENT)
Dept: BARIATRICS | Facility: CLINIC | Age: 29
End: 2024-11-07

## 2025-01-22 ENCOUNTER — APPOINTMENT (OUTPATIENT)
Dept: LAB | Facility: MEDICAL CENTER | Age: 30
End: 2025-01-22
Payer: COMMERCIAL

## 2025-01-22 ENCOUNTER — RESULTS FOLLOW-UP (OUTPATIENT)
Dept: GASTROENTEROLOGY | Facility: CLINIC | Age: 30
End: 2025-01-22

## 2025-01-22 ENCOUNTER — TELEPHONE (OUTPATIENT)
Age: 30
End: 2025-01-22

## 2025-01-22 DIAGNOSIS — R79.89 LOW VITAMIN B12 LEVEL: ICD-10-CM

## 2025-01-22 DIAGNOSIS — Z00.00 ANNUAL PHYSICAL EXAM: ICD-10-CM

## 2025-01-22 DIAGNOSIS — K50.119 CROHN'S DISEASE OF COLON WITH COMPLICATION (HCC): ICD-10-CM

## 2025-01-22 DIAGNOSIS — D84.9 IMMUNOCOMPROMISED STATE (HCC): ICD-10-CM

## 2025-01-22 DIAGNOSIS — K50.119 CROHN'S DISEASE OF COLON WITH COMPLICATION (HCC): Primary | ICD-10-CM

## 2025-01-22 DIAGNOSIS — D50.9 IRON DEFICIENCY ANEMIA, UNSPECIFIED IRON DEFICIENCY ANEMIA TYPE: ICD-10-CM

## 2025-01-22 DIAGNOSIS — K76.0 FATTY LIVER: ICD-10-CM

## 2025-01-22 LAB
25(OH)D3 SERPL-MCNC: 21.4 NG/ML (ref 30–100)
ALBUMIN SERPL BCG-MCNC: 4.1 G/DL (ref 3.5–5)
ALP SERPL-CCNC: 56 U/L (ref 34–104)
ALT SERPL W P-5'-P-CCNC: 21 U/L (ref 7–52)
ANION GAP SERPL CALCULATED.3IONS-SCNC: 10 MMOL/L (ref 4–13)
AST SERPL W P-5'-P-CCNC: 17 U/L (ref 13–39)
BASOPHILS # BLD AUTO: 0.06 THOUSANDS/ΜL (ref 0–0.1)
BASOPHILS NFR BLD AUTO: 1 % (ref 0–1)
BILIRUB SERPL-MCNC: 0.45 MG/DL (ref 0.2–1)
BUN SERPL-MCNC: 13 MG/DL (ref 5–25)
CALCIUM SERPL-MCNC: 9.4 MG/DL (ref 8.4–10.2)
CHLORIDE SERPL-SCNC: 103 MMOL/L (ref 96–108)
CO2 SERPL-SCNC: 25 MMOL/L (ref 21–32)
CREAT SERPL-MCNC: 0.7 MG/DL (ref 0.6–1.3)
CRP SERPL QL: 3.4 MG/L
EOSINOPHIL # BLD AUTO: 0.19 THOUSAND/ΜL (ref 0–0.61)
EOSINOPHIL NFR BLD AUTO: 3 % (ref 0–6)
ERYTHROCYTE [DISTWIDTH] IN BLOOD BY AUTOMATED COUNT: 11.9 % (ref 11.6–15.1)
FERRITIN SERPL-MCNC: 44 NG/ML (ref 11–307)
GFR SERPL CREATININE-BSD FRML MDRD: 118 ML/MIN/1.73SQ M
GLUCOSE P FAST SERPL-MCNC: 89 MG/DL (ref 65–99)
HCT VFR BLD AUTO: 36.9 % (ref 34.8–46.1)
HGB BLD-MCNC: 12.9 G/DL (ref 11.5–15.4)
IMM GRANULOCYTES # BLD AUTO: 0.03 THOUSAND/UL (ref 0–0.2)
IMM GRANULOCYTES NFR BLD AUTO: 0 % (ref 0–2)
IRON SATN MFR SERPL: 27 % (ref 15–50)
IRON SERPL-MCNC: 107 UG/DL (ref 50–212)
LYMPHOCYTES # BLD AUTO: 2.66 THOUSANDS/ΜL (ref 0.6–4.47)
LYMPHOCYTES NFR BLD AUTO: 36 % (ref 14–44)
MCH RBC QN AUTO: 30 PG (ref 26.8–34.3)
MCHC RBC AUTO-ENTMCNC: 35 G/DL (ref 31.4–37.4)
MCV RBC AUTO: 86 FL (ref 82–98)
MONOCYTES # BLD AUTO: 0.51 THOUSAND/ΜL (ref 0.17–1.22)
MONOCYTES NFR BLD AUTO: 7 % (ref 4–12)
NEUTROPHILS # BLD AUTO: 3.94 THOUSANDS/ΜL (ref 1.85–7.62)
NEUTS SEG NFR BLD AUTO: 53 % (ref 43–75)
NRBC BLD AUTO-RTO: 0 /100 WBCS
PLATELET # BLD AUTO: 380 THOUSANDS/UL (ref 149–390)
PMV BLD AUTO: 9.8 FL (ref 8.9–12.7)
POTASSIUM SERPL-SCNC: 4 MMOL/L (ref 3.5–5.3)
PROT SERPL-MCNC: 7.3 G/DL (ref 6.4–8.4)
RBC # BLD AUTO: 4.3 MILLION/UL (ref 3.81–5.12)
SODIUM SERPL-SCNC: 138 MMOL/L (ref 135–147)
TIBC SERPL-MCNC: 400.4 UG/DL (ref 250–450)
TRANSFERRIN SERPL-MCNC: 286 MG/DL (ref 203–362)
UIBC SERPL-MCNC: 293 UG/DL (ref 155–355)
VIT B12 SERPL-MCNC: 411 PG/ML (ref 180–914)
WBC # BLD AUTO: 7.39 THOUSAND/UL (ref 4.31–10.16)

## 2025-01-22 PROCEDURE — 82728 ASSAY OF FERRITIN: CPT

## 2025-01-22 PROCEDURE — 36415 COLL VENOUS BLD VENIPUNCTURE: CPT

## 2025-01-22 PROCEDURE — 82306 VITAMIN D 25 HYDROXY: CPT

## 2025-01-22 PROCEDURE — 82607 VITAMIN B-12: CPT

## 2025-01-22 PROCEDURE — 83550 IRON BINDING TEST: CPT

## 2025-01-22 PROCEDURE — 86140 C-REACTIVE PROTEIN: CPT

## 2025-01-22 PROCEDURE — 85025 COMPLETE CBC W/AUTO DIFF WBC: CPT

## 2025-01-22 PROCEDURE — 83540 ASSAY OF IRON: CPT

## 2025-01-22 PROCEDURE — 80053 COMPREHEN METABOLIC PANEL: CPT

## 2025-01-22 NOTE — TELEPHONE ENCOUNTER
Last OV 10/28 Dr. Heart  Next OV 5/1/25 A Bone PA-C  Hx Crohn's    I connected with the patient on the phone.  Patient states since Monday she has not been feeling well and had more frequency and bowel movements.  Monday 9-10 bowel movements.  Tuesday 7-8 soft loose/liquid.  Denies blood. Stomach pain lower abdomen during bowel movements and not relieved after bowels.  Taking Rinvoq 30 mg daily.  Recommended   Stay well-hydrated   Low residue diet  Blood work and stool tests to check for infection and inflammation.  Patient states he can  the stool test kits today.  Reviewed symptoms for ED evaluation.

## 2025-01-23 ENCOUNTER — APPOINTMENT (OUTPATIENT)
Dept: LAB | Facility: MEDICAL CENTER | Age: 30
End: 2025-01-23
Payer: COMMERCIAL

## 2025-01-23 DIAGNOSIS — K50.119 CROHN'S DISEASE OF COLON WITH COMPLICATION (HCC): ICD-10-CM

## 2025-01-23 PROCEDURE — 87209 SMEAR COMPLEX STAIN: CPT

## 2025-01-23 PROCEDURE — 83993 ASSAY FOR CALPROTECTIN FECAL: CPT

## 2025-01-23 PROCEDURE — 87177 OVA AND PARASITES SMEARS: CPT

## 2025-01-23 PROCEDURE — 87506 IADNA-DNA/RNA PROBE TQ 6-11: CPT

## 2025-01-24 ENCOUNTER — RESULTS FOLLOW-UP (OUTPATIENT)
Dept: GASTROENTEROLOGY | Facility: CLINIC | Age: 30
End: 2025-01-24

## 2025-01-24 DIAGNOSIS — K50.119 CROHN'S DISEASE OF COLON WITH COMPLICATION (HCC): Primary | ICD-10-CM

## 2025-01-24 LAB
C COLI+JEJUNI TUF STL QL NAA+PROBE: NEGATIVE
C DIFF TOX GENS STL QL NAA+PROBE: NEGATIVE
CALPROTECTIN STL-MCNC: 289 ΜG/G
EC STX1+STX2 GENES STL QL NAA+PROBE: NEGATIVE
SALMONELLA SP SPAO STL QL NAA+PROBE: NEGATIVE
SHIGELLA SP+EIEC IPAH STL QL NAA+PROBE: NEGATIVE

## 2025-01-24 NOTE — TELEPHONE ENCOUNTER
Called patient, reached voicemail, left detailed message stools samples are negative so far and waiting for 1 more test stool test and fecal calprotectin is a bit elevated and our office will call once final stool test is results and to call with any concerns prior to our office reaching out once final stool test is resulted.

## 2025-01-25 ENCOUNTER — OFFICE VISIT (OUTPATIENT)
Dept: URGENT CARE | Facility: MEDICAL CENTER | Age: 30
End: 2025-01-25
Payer: COMMERCIAL

## 2025-01-25 VITALS
WEIGHT: 235 LBS | TEMPERATURE: 102.9 F | BODY MASS INDEX: 37.93 KG/M2 | SYSTOLIC BLOOD PRESSURE: 138 MMHG | HEART RATE: 125 BPM | DIASTOLIC BLOOD PRESSURE: 79 MMHG | OXYGEN SATURATION: 96 % | RESPIRATION RATE: 20 BRPM

## 2025-01-25 DIAGNOSIS — Z20.828 EXPOSURE TO THE FLU: ICD-10-CM

## 2025-01-25 DIAGNOSIS — R68.89 FLU-LIKE SYMPTOMS: ICD-10-CM

## 2025-01-25 DIAGNOSIS — F41.9 ANXIETY: ICD-10-CM

## 2025-01-25 DIAGNOSIS — J06.9 ACUTE UPPER RESPIRATORY INFECTION: Primary | ICD-10-CM

## 2025-01-25 PROCEDURE — 99213 OFFICE O/P EST LOW 20 MIN: CPT | Performed by: PHYSICIAN ASSISTANT

## 2025-01-25 PROCEDURE — 87636 SARSCOV2 & INF A&B AMP PRB: CPT | Performed by: PHYSICIAN ASSISTANT

## 2025-01-25 RX ORDER — OSELTAMIVIR PHOSPHATE 75 MG/1
75 CAPSULE ORAL EVERY 12 HOURS SCHEDULED
Qty: 10 CAPSULE | Refills: 0 | Status: SHIPPED | OUTPATIENT
Start: 2025-01-25 | End: 2025-01-30

## 2025-01-25 NOTE — PROGRESS NOTES
[facility]        NAME: Elena Harmon is a 29 y.o. female  : 1995    MRN: [id number]  DATE: 2025  TIME: 3:20 PM      Assessment and Plan     Acute upper respiratory infection [J06.9]  1. Acute upper respiratory infection        2. Exposure to the flu  Covid/Flu- Office Collect Normal    Covid/Flu- Office Collect Normal    oseltamivir (TAMIFLU) 75 mg capsule      3. Flu-like symptoms  oseltamivir (TAMIFLU) 75 mg capsule          POC Testing Results        Note:       Patient Instructions     Patient Instructions    You may  take OTC Tylenol for pain. You may take no more than 1000 mg tabs every 6 hours (no more than 4 grams in 24 hours!).  Please gargle with salt water as needed for sore throat. Please increase fluid intake.  You may take OTC cough medication such as Mucinex DM for cough/congestion.         Follow up with primary care provider.   Go to ER if symptoms worsen.    Chief Complaint     Chief Complaint   Patient presents with    Cold Like Symptoms     Patient states starting Thursday she started with tickle in throat, cough, chest congestion; fever started today     Denies nausea, vomiting, diarrhea     Flu exposure; last weekend     Fever         History of Present Illness     Pt reports symptoms began Thursday with tickle in throat, congestion, cough and now fever. She reports positive flu exposure. She denies n/v.    Fever - 9 weeks to 74 years   This is a new problem. The current episode started yesterday. The problem occurs constantly. The problem has been rapidly worsening. The maximum temperature noted was 102 to 102.9 F. The temperature was taken using an oral thermometer. Associated symptoms include congestion, coughing, diarrhea, headaches, muscle aches and a sore throat. Pertinent negatives include no abdominal pain, chest pain, ear pain, nausea, rash, sleepiness, urinary pain, vomiting or wheezing.   Risk factors: hx of cancer and immunosuppression    Risk  factors: no contaminated food, no contaminated water, no occupational exposure, no recent sickness, no recent travel and no sick contacts        Review of Systems     Review of Systems   Constitutional:  Positive for fever.   HENT:  Positive for congestion and sore throat. Negative for ear pain.    Respiratory:  Positive for cough. Negative for wheezing.    Cardiovascular:  Negative for chest pain.   Gastrointestinal:  Positive for diarrhea. Negative for abdominal pain, nausea and vomiting.   Genitourinary:  Negative for dysuria.   Skin:  Negative for rash.   Neurological:  Positive for headaches.         Current Medications       Current Outpatient Medications:     oseltamivir (TAMIFLU) 75 mg capsule, Take 1 capsule (75 mg total) by mouth every 12 (twelve) hours for 5 days, Disp: 10 capsule, Rfl: 0    albuterol (ProAir HFA) 90 mcg/act inhaler, Inhale 2 puffs every 6 (six) hours as needed for wheezing, Disp: 8.5 g, Rfl: 0    desvenlafaxine (PRISTIQ) 100 mg 24 hr tablet, TAKE 1 TABLET (100 MG TOTAL) BY MOUTH DAILY, Disp: 30 tablet, Rfl: 5    Drospirenone 4 MG TABS, Take 1 tablet by mouth daily, Disp: 84 tablet, Rfl: 4    montelukast (SINGULAIR) 10 mg tablet, Take 1 tablet (10 mg total) by mouth daily at bedtime, Disp: 90 tablet, Rfl: 1    Naltrexone-buPROPion HCl ER 8-90 MG TB12, Take 1 tab bid for one week, then increase to 2 po bid thereafter (Patient not taking: Reported on 10/28/2024), Disp: 120 tablet, Rfl: 2    ondansetron (ZOFRAN-ODT) 4 mg disintegrating tablet, Take 1 tablet (4 mg total) by mouth every 6 (six) hours as needed for nausea or vomiting, Disp: 20 tablet, Rfl: 0    scopolamine (TRANSDERM-SCOP) 1 mg/3 days TD 72 hr patch, Place 1 patch on the skin over 72 hours every third day, Disp: 10 patch, Rfl: 0    triamcinolone (KENALOG) 0.1 % cream, Apply topically 2 (two) times a day (Patient not taking: Reported on 10/28/2024), Disp: 15 g, Rfl: 4    Upadacitinib ER (Rinvoq) 30 MG TB24, Take 30 mg by mouth  daily, Disp: 30 tablet, Rfl: 11    Current Allergies     Allergies as of 01/25/2025 - Reviewed 01/25/2025   Allergen Reaction Noted    Penicillins Rash 03/26/2019              The following portions of the patient's history were reviewed and updated as appropriate: allergies, current medications, past family history, past medical history, past social history, past surgical history, and problem list.     Past Medical History:   Diagnosis Date    Abnormal Pap smear of cervix     Allergic     Anemia     Anxiety     Crohn's colitis (HCC)     Crohn's disease (HCC)        Past Surgical History:   Procedure Laterality Date    COLONOSCOPY         Family History   Problem Relation Age of Onset    Skin cancer Mother     Hypertension Father     Alcohol abuse Father     Breast cancer Maternal Grandmother     Ovarian cancer Maternal Grandmother          Medications have been verified.        Objective     /79   Pulse (!) 125   Temp (!) 102.9 °F (39.4 °C) (Tympanic)   Resp 20   Wt 107 kg (235 lb)   SpO2 96%   BMI 37.93 kg/m²   No LMP recorded. (Menstrual status: Birth Control).         Physical Exam     Physical Exam  Vitals and nursing note reviewed.   Constitutional:       General: She is not in acute distress.     Appearance: Normal appearance. She is not ill-appearing, toxic-appearing or diaphoretic.   HENT:      Head: Normocephalic and atraumatic.      Right Ear: Tympanic membrane, ear canal and external ear normal. There is no impacted cerumen.      Left Ear: Tympanic membrane, ear canal and external ear normal. There is no impacted cerumen.      Nose: Nose normal.      Mouth/Throat:      Lips: Pink. No lesions.      Mouth: Mucous membranes are moist.      Tongue: No lesions. Tongue does not deviate from midline.      Palate: No mass and lesions.      Pharynx: Oropharynx is clear. Uvula midline. No pharyngeal swelling, oropharyngeal exudate, posterior oropharyngeal erythema or uvula swelling.      Tonsils: No  tonsillar exudate or tonsillar abscesses.   Eyes:      General: No scleral icterus.        Right eye: No discharge.         Left eye: No discharge.      Extraocular Movements: Extraocular movements intact.      Conjunctiva/sclera: Conjunctivae normal.      Pupils: Pupils are equal, round, and reactive to light.   Cardiovascular:      Rate and Rhythm: Normal rate and regular rhythm.      Heart sounds: Normal heart sounds. No murmur heard.     No friction rub. No gallop.   Pulmonary:      Effort: Pulmonary effort is normal. No respiratory distress.      Breath sounds: Normal breath sounds. No stridor. No wheezing, rhonchi or rales.   Musculoskeletal:      Cervical back: Normal range of motion and neck supple. No rigidity.   Lymphadenopathy:      Cervical: No cervical adenopathy.   Skin:     General: Skin is warm and dry.      Coloration: Skin is not jaundiced or pale.      Findings: No bruising, erythema, lesion or rash.   Neurological:      General: No focal deficit present.      Mental Status: She is alert and oriented to person, place, and time. Mental status is at baseline.   Psychiatric:         Mood and Affect: Mood normal.         Behavior: Behavior normal.         Thought Content: Thought content normal.         Judgment: Judgment normal.

## 2025-01-25 NOTE — LETTER
January 25, 2025     Patient: Elena Harmon   YOB: 1995   Date of Visit: 1/25/2025       To Whom It May Concern:    It is my medical opinion that Elena Harmon may return to work on 1/27/25 .    If you have any questions or concerns, please don't hesitate to call.         Sincerely,        Karyna Taveras PA-C    CC: No Recipients

## 2025-01-25 NOTE — PATIENT INSTRUCTIONS
You may  take OTC Tylenol for pain. You may take no more than 1000 mg tabs every 6 hours (no more than 4 grams in 24 hours!).  Please gargle with salt water as needed for sore throat. Please increase fluid intake.  You may take OTC cough medication such as Mucinex DM for cough/congestion.

## 2025-01-27 RX ORDER — DESVENLAFAXINE 100 MG/1
100 TABLET, EXTENDED RELEASE ORAL DAILY
Qty: 30 TABLET | Refills: 5 | Status: SHIPPED | OUTPATIENT
Start: 2025-01-27

## 2025-01-28 LAB
G LAMBLIA AG STL QL IA: NEGATIVE
O+P STL CONC: NORMAL

## 2025-01-29 ENCOUNTER — TELEPHONE (OUTPATIENT)
Age: 30
End: 2025-01-29

## 2025-01-29 ENCOUNTER — OFFICE VISIT (OUTPATIENT)
Dept: BARIATRICS | Facility: CLINIC | Age: 30
End: 2025-01-29
Payer: COMMERCIAL

## 2025-01-29 VITALS
OXYGEN SATURATION: 99 % | DIASTOLIC BLOOD PRESSURE: 90 MMHG | HEIGHT: 67 IN | WEIGHT: 232.2 LBS | HEART RATE: 93 BPM | BODY MASS INDEX: 36.44 KG/M2 | SYSTOLIC BLOOD PRESSURE: 140 MMHG

## 2025-01-29 DIAGNOSIS — E66.812 CLASS 2 SEVERE OBESITY DUE TO EXCESS CALORIES WITH SERIOUS COMORBIDITY AND BODY MASS INDEX (BMI) OF 36.0 TO 36.9 IN ADULT (HCC): Primary | ICD-10-CM

## 2025-01-29 DIAGNOSIS — E66.01 CLASS 2 SEVERE OBESITY DUE TO EXCESS CALORIES WITH SERIOUS COMORBIDITY AND BODY MASS INDEX (BMI) OF 36.0 TO 36.9 IN ADULT (HCC): Primary | ICD-10-CM

## 2025-01-29 DIAGNOSIS — R94.31 EKG ABNORMALITIES: ICD-10-CM

## 2025-01-29 DIAGNOSIS — E66.9 OBESITY (BMI 30-39.9): ICD-10-CM

## 2025-01-29 DIAGNOSIS — K76.0 FATTY LIVER: ICD-10-CM

## 2025-01-29 PROCEDURE — 99204 OFFICE O/P NEW MOD 45 MIN: CPT | Performed by: INTERNAL MEDICINE

## 2025-01-29 RX ORDER — TOPIRAMATE 25 MG/1
TABLET, FILM COATED ORAL
Qty: 60 TABLET | Refills: 3 | Status: SHIPPED | OUTPATIENT
Start: 2025-01-29

## 2025-01-29 NOTE — PROGRESS NOTES
Assessment/Plan     Elena Harmon is 29 y.o. year old female  who comes in for consultation for assistance with weight management.     - Discussed options of HealthyCORE-Intensive Lifestyle Intervention Program, Very Low Calorie Diet-VLCD, and Conservative Program and the role of weight loss medications.  - Patient is interested in pursuing Conservative Program    -  Main drivers of patients elevated body weight -  genetics, excess calories, Physical inactivity, medications, emotional eating, snacking/grazing, mindless/boredom eating, inadequate protein leading to poor satiety, and sugar sweetened beverages    Class 2 severe obesity due to excess calories with serious comorbidity and body mass index (BMI) of 36.0 to 36.9 in adult (HCC)  Antiobesity Medications/Medical --class II obesity BMI of 36.3 with hyperlipidemia  -Patient reports that injectable GLP-1 medications Qsymia and Contrave were not covered by her insurance  -Discussed 2 options were either using phentermine and Topamax or naltrexone bupropion  -Her current blood pressure today was elevated at 140/90.  We decided against initiating phentermine at this office visit.  A twelve-lead EKG will be ordered  -We will initiate Topamax which might help improve her blood pressure.  Plan to initiate phentermine next office visit if systolic blood pressure less than 140 and diastolic blood pressure less than 80 and heart rate between 70-80  -No contraindications to metformin Wellbutrin naltrexone    Nutrition: start the day with a protein rich breakfast-consider meal replacement protein shakes instead of carbohydrate rich foods  -Substitute healthy snacks for processed foods-list provided  -Aim for 20 to 30 g of protein with meals and around 10 g with snacks.  Specifics to be provided by the dietitian  -Patient would like to work with a dietitian through the nutrition bundles  -Monitor liquid calories  -Monitor excess calories from eating out    Physical  Activity: Patient has a [employer] membership that she plans on using.  If she would not like to go there, suggested YouTube videos for resistance band floor workouts wall Pilates body weight exercises and even cardio routines at home RB cardio     Sleep: -STOP-BANG 2/8; 8 to 9 hours of sleep at night    Food Behaviors/Stress: Patient attest to emotional eating and some snacking and grazing.  Informed her of some virtual support groups that could guide these food behaviors         Elena was seen today for consult.    Diagnoses and all orders for this visit:    Class 2 severe obesity due to excess calories with serious comorbidity and body mass index (BMI) of 36.0 to 36.9 in adult (HCC)  -     topiramate (Topamax) 25 mg tablet; Take 1 tablet 20 minutes before evening meal and after 1 week increase to 1 tablet twice a day before meals    Fatty liver  -     Ambulatory Referral to Weight Management    Obesity (BMI 30-39.9)  -     Ambulatory Referral to Weight Management    EKG abnormalities  -     ECG 12 lead; Future          -In addition, please follow general recommendations below.          Return visit:  6-8 weeks        General Lifestyle recommendations:    Nutrition   -Avoid skipping meals. Avoid sugary beverages. At least 64oz of water daily.  Limit processed food, refined sugars and grain. Encourage  healthy choices for meals and snacks   -Focus on protein goals and non starchy fiber rich vegetables for satiety effect and to help support a calorie deficit.   - Emphasize portion control, well balanced macronutrient's (protein, carbohydrate, fat using MyPlate method )and adequate protein with each meal/snacks and distributing calories equally throughout the day along with.   -Advise starting the day with a protein breakfast   Behavioral/Stress   Food log via du or provided paper log (du options include www.Qwentynesspal.com, sparkpeople.com, JDCPhosphateit.com, calorieking.com, [employer] Observer). Encouraged mindful  "eating. Be sure to set aside time to eat, eat slowly, and savor your food. Consider meditation apps and/or taking a few minutes of mindfulness every AM. Understand the role of regarding the role of stress hormone cortisol in promoting weight gain and visceral fat accumulation. Weigh daily or atleast 2-3 times/ week  Physical Activity   Increase physical activity by 10 minutes daily. Gradually increase physical activity to a goal of 5 days per week for 30 minutes of MODERATE intensity ( should be able to pass the \"talk test\" but should not be able to sing. Target 150-300 minutes  PLUS 2 days per week of FULL BODY resistance training. Progression will be addressed at follow up visits. Encouraged contemplation regarding establishing a daily physical activity routine  - Resistance training along with increase protein intake is important to maintain and enhance metabolism  Sleep   Encourage sleep hygiene and importance of having adequate sleep duration at least > 6 hours to support response in weight loss efforts    Handouts provided :  THRIVE program at [facility]  MyPlate and food quality  Food log resources, phone du or paper journal  Antiobesity medications options     - Discussed at length and the role of weight loss medications and medication options   - Explained the importance of making lifestyle changes in addition to starting any anti-obesity medications if the  patient chooses.  -  Initial weight loss goal of 5-10% weight loss for improved health  - Weight loss can improve patient's co-morbid conditions and/or prevent weight-related complications.  - Weight is not at goal and patient has been unable to achieve a meaningful weight loss above 5% using various programs and tools for more than 6 months    Elena was seen today for consult.    Diagnoses and all orders for this visit:    Class 2 severe obesity due to excess calories with serious comorbidity and body mass index (BMI) of 36.0 to 36.9 in adult " (HCC)  -     topiramate (Topamax) 25 mg tablet; Take 1 tablet 20 minutes before evening meal and after 1 week increase to 1 tablet twice a day before meals    Fatty liver  -     Ambulatory Referral to Weight Management    Obesity (BMI 30-39.9)  -     Ambulatory Referral to Weight Management    EKG abnormalities  -     ECG 12 lead; Future            Topiramate Instructions:  -Patient was counseled that the medication is associated with cleft palate if used during the first trimester of pregnancy and therefore was instructed to use birth control during the period of her use of this medication.  -May reduce the effectiveness of hormonal birth control - backup method such as condoms recommended to avoid pregnancy.  -Upon discontinuation tapering dose over using every other day dosing is recommended.  - patient to maintain adequate fluid intake to minimize risk of kidney stones  Advise patient against sudden discontinuation, as this may cause increased seizure activity  Instruct female patient to use an additional form of contraception due to decreased effectiveness of estrogen-containing or progestin-only contraceptives  - report symptoms of acute myopia, sudden ocular pain, blurred vision, or decreased visual acuity.  -Advise patient to report new or worsening depression, suicidal thoughts or behavior, or unusual changes in mood or behavior    Reviewed the potential side effects of topiramate (Topamax) which may include - numbness or tingling, difficulty with word finding, metabolic acidosis, occurrence of gallstones and kidney stones, glaucoma, fatigue, worsening depression/anxiety, changes in taste, abdominal upset/heartburn, and trouble sleeping. Side effects may include anorexia, taste perversion, fatigue, paresthesia, psychomotor slowing, memory or concentration difficulties, cognitive dysfunction, mood problems, and flushing.           Total time spent reviewing chart, interviewing patient, examining  "patient, discussing plan, answering all questions, and documentin min, with >50% face-to-face time spent counseling patient on nonsurgical interventions for the treatment of excess weight. Discussed in detail nonsurgical options including intensive lifestyle intervention program, very low-calorie diet program and conservative program.  Discussed the role of weight loss medications.  Counseled patient on diet behavior and exercise modification for weight loss.        History of present illness/ Weight history       Onset--have always struggled with weight \" middle and high school\"   Tried fad diets, reports she is super picky eater and struggles to make healthy choices  With her Crohn's disease healthy choices such as fiber rich foods flare her symptoms-   175-180 lbs out of HS   1st diagnosed with Crohn's dropped a lot of weight in 2019 and had first flare in  and was on steroids each time.  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    Fam hx of obesity-father      Previous Weight loss medications/Weight loss attempts:   Tried WW, keto, intermittent fasting tried it last year and the year before her most recent attempts  Patient reports and other history-    GI referral  Wt Readings from Last 30 Encounters:   25 105 kg (232 lb 3.2 oz)   25 107 kg (235 lb)   10/28/24 104 kg (230 lb)   10/01/24 111 kg (245 lb 6.4 oz)   09/10/24 109 kg (240 lb)   24 106 kg (234 lb)   24 108 kg (238 lb)   24 109 kg (240 lb 8 oz)   24 107 kg (235 lb)   24 108 kg (237 lb)   24 110 kg (243 lb)   10/27/23 102 kg (225 lb)   10/17/23 102 kg (225 lb)   10/16/23 102 kg (225 lb)   23 103 kg (226 lb)   23 104 kg (229 lb 12.8 oz)   23 99.8 kg (220 lb)   23 99.8 kg (220 lb)   23 102 kg (224 lb 8 oz)   23 99.8 kg (220 lb) "   11/15/22 99.1 kg (218 lb 6.4 oz)   09/02/22 98 kg (216 lb)   06/28/22 93.9 kg (207 lb)   06/01/22 94.7 kg (208 lb 12.8 oz)   04/01/22 89.8 kg (198 lb)   03/13/22 89.8 kg (197 lb 15.6 oz)   02/11/22 89.8 kg (198 lb)   01/18/22 90.5 kg (199 lb 9.6 oz)   10/04/21 86.4 kg (190 lb 7.6 oz)   09/09/21 85 kg (187 lb 8 oz)    BMI Readings from Last 30 Encounters:   01/29/25 36.37 kg/m²   01/25/25 37.93 kg/m²   10/28/24 37.12 kg/m²   10/01/24 39.61 kg/m²   09/10/24 38.74 kg/m²   08/02/24 37.77 kg/m²   06/03/24 38.41 kg/m²   04/05/24 38.82 kg/m²   03/25/24 37.93 kg/m²   03/20/24 38.25 kg/m²   03/17/24 39.22 kg/m²   10/27/23 36.32 kg/m²   10/17/23 36.32 kg/m²   10/16/23 36.32 kg/m²   09/05/23 36.48 kg/m²   07/28/23 37.09 kg/m²   05/19/23 35.51 kg/m²   05/11/23 35.51 kg/m²   01/24/23 36.24 kg/m²   01/03/23 35.51 kg/m²   12/09/22 35.25 kg/m²   11/15/22 35.25 kg/m²   09/02/22 34.86 kg/m²   06/28/22 33.41 kg/m²   06/01/22 33.70 kg/m²   04/01/22 31.96 kg/m²   03/13/22 31.95 kg/m²   02/11/22 31.96 kg/m²   01/18/22 32.22 kg/m²   10/04/21 30.74 kg/m²                   Lifestyle questionnaire     Doesn't have a sweet tooth likes salty crunchy  Diet recall:  B: cereal rice crispies or toast or apple with peanut butter  S: goldfish pretzels   L: Leftovers or take out  S:  D: cooks at home also works for [employer] and [employer] on Sundays P?V/S  S: none    Frequency Eating out x/ week: 3x/ week    Food behaviorsstress/emotional eating, boredom eating, and snacking/grazing    Trouble area of the day: during the day    Beverages  Water--  64 oz   Caffeine/tea--12  oz   SSB --once a week    Alcohol: 3 drinks/ week       Social History     Substance and Sexual Activity   Alcohol Use Yes    Alcohol/week: 4.0 standard drinks of alcohol    Types: 4 Glasses of wine per week      Social History     Tobacco Use   Smoking Status Never    Passive exposure: Never   Smokeless Tobacco Never      Social History     Substance and Sexual Activity   Drug Use Never          Physical Activity --was walking in the summer none now    Sleep -- Stop bang: Score: 2 / 8  ; 8-9  hours of sleep per night    Occupation-HR      Psycho social- lives with mom and dad    Gyneac (Menopausal status/menses/contraception)-OCP      Start date: 1/29/2025   Intial weight on 1/29/2025 : 105 kg (232 lb 3.2 oz)    on 1/29/2025 :Body mass index is 36.37 kg/m².  Obesity Class: 35.0-39.9- Obesity Class II  Goal weight: 175 lbs    Waist circumference (inches): 40 Inches    Colonoscopy: 08/23/2024       Medication considerations/contraindications     -Patient denies personal history of pancreatitis. Patient also denies personal and family history of medullary thyroid cancer, and multiple endocrine neoplasia type 2 (MEN 2 tumor). -Patient denies any history of kidney stones, seizures, or glaucoma, diabetic retinopathy, gall bladder disease, gastroparesis, hyperthyroidism.  -Denies Hx of CAD, PAD, palpitations, arrhythmia, uncontrolled hypertension  -Denies uncontrolled anxiety or depression, suicidal behavior or thinking , insomnia or sleep disturbance.         Past medical history/past surgical history       Previous notes and records have been reviewed.    The following portions of the patient's history were reviewed and updated as appropriate: allergies, current medications, past family history, past medical history, past social history, past surgical history, and problem list.    Past Medical History:   Diagnosis Date    Abnormal Pap smear of cervix     Allergic     Anemia     Anxiety     Crohn's colitis (HCC)     Crohn's disease (HCC)          Past Surgical History:   Procedure Laterality Date    COLONOSCOPY               Family History   Problem Relation Age of Onset    Skin cancer Mother     Hypertension Father     Alcohol abuse Father     Breast cancer Maternal Grandmother     Ovarian cancer Maternal Grandmother             Objective     /90 (BP Location: Left arm,  "Patient Position: Sitting, Cuff Size: Large)   Pulse 93   Ht 5' 7\" (1.702 m)   Wt 105 kg (232 lb 3.2 oz)   SpO2 99%   BMI 36.37 kg/m²     Review of Systems   Constitutional:  Negative for chills, fatigue and fever.   HENT:  Negative for ear pain and sore throat.    Eyes:  Negative for pain and visual disturbance.   Respiratory:  Negative for cough and shortness of breath.    Cardiovascular:  Negative for chest pain, palpitations and leg swelling.   Gastrointestinal:  Negative for abdominal pain, constipation, diarrhea, nausea and vomiting.   Genitourinary:  Negative for dysuria and hematuria.   Musculoskeletal:  Negative for arthralgias and back pain.   Skin:  Negative for color change and rash.   Neurological:  Negative for seizures, syncope and headaches.   Psychiatric/Behavioral:  Negative for dysphoric mood. The patient is not nervous/anxious.    All other systems reviewed and are negative.    Physical Exam  Vitals and nursing note reviewed.   Constitutional:       Appearance: Normal appearance.   HENT:      Head: Normocephalic.   Pulmonary:      Effort: Pulmonary effort is normal.   Neurological:      General: No focal deficit present.      Mental Status: He is alert and oriented to person, place, and time.   Psychiatric:         Mood and Affect: Mood normal.         Behavior: Behavior normal.         Thought Content: Thought content normal.         Judgment: Judgment normal.         Medications       Current Outpatient Medications:     desvenlafaxine (PRISTIQ) 100 mg 24 hr tablet, TAKE 1 TABLET (100 MG TOTAL) BY MOUTH DAILY, Disp: 30 tablet, Rfl: 5    Drospirenone 4 MG TABS, Take 1 tablet by mouth daily, Disp: 84 tablet, Rfl: 4    montelukast (SINGULAIR) 10 mg tablet, Take 1 tablet (10 mg total) by mouth daily at bedtime, Disp: 90 tablet, Rfl: 1    ondansetron (ZOFRAN-ODT) 4 mg disintegrating tablet, Take 1 tablet (4 mg total) by mouth every 6 (six) hours as needed for nausea or vomiting, Disp: 20 tablet, " "Rfl: 0    oseltamivir (TAMIFLU) 75 mg capsule, Take 1 capsule (75 mg total) by mouth every 12 (twelve) hours for 5 days, Disp: 10 capsule, Rfl: 0    scopolamine (TRANSDERM-SCOP) 1 mg/3 days TD 72 hr patch, Place 1 patch on the skin over 72 hours every third day, Disp: 10 patch, Rfl: 0    topiramate (Topamax) 25 mg tablet, Take 1 tablet 20 minutes before evening meal and after 1 week increase to 1 tablet twice a day before meals, Disp: 60 tablet, Rfl: 3    Upadacitinib ER (Rinvoq) 30 MG TB24, Take 30 mg by mouth daily, Disp: 30 tablet, Rfl: 11    albuterol (ProAir HFA) 90 mcg/act inhaler, Inhale 2 puffs every 6 (six) hours as needed for wheezing (Patient not taking: Reported on 1/29/2025), Disp: 8.5 g, Rfl: 0    Naltrexone-buPROPion HCl ER 8-90 MG TB12, Take 1 tab bid for one week, then increase to 2 po bid thereafter (Patient not taking: Reported on 10/28/2024), Disp: 120 tablet, Rfl: 2    triamcinolone (KENALOG) 0.1 % cream, Apply topically 2 (two) times a day (Patient not taking: Reported on 10/28/2024), Disp: 15 g, Rfl: 4           Labs and imaging     Recent labs and imaging have been personally reviewed.  Lab Results   Component Value Date    WBC 7.39 01/22/2025    HGB 12.9 01/22/2025    HCT 36.9 01/22/2025    MCV 86 01/22/2025     01/22/2025     Lab Results   Component Value Date    SODIUM 138 01/22/2025    K 4.0 01/22/2025     01/22/2025    CO2 25 01/22/2025    AGAP 10 01/22/2025    BUN 13 01/22/2025    CREATININE 0.70 01/22/2025    GLUC 108 05/01/2024    GLUF 89 01/22/2025    CALCIUM 9.4 01/22/2025    AST 17 01/22/2025    ALT 21 01/22/2025    ALKPHOS 56 01/22/2025    TP 7.3 01/22/2025    TBILI 0.45 01/22/2025    EGFR 118 01/22/2025     No results found for: \"HGBA1C\"  Lab Results   Component Value Date    GPC3AGVPXZSW 0.930 04/22/2024     Lab Results   Component Value Date    CHOLESTEROL 217 (H) 07/31/2023     Lab Results   Component Value Date    HDL 55 07/31/2023     Lab Results   Component " Value Date    TRIG 271 (H) 07/31/2023     Lab Results   Component Value Date    LDLCALC 108 (H) 07/31/2023     Vit D, 25-Hydroxy   Date Value Ref Range Status   01/22/2025 21.4 (L) 30.0 - 100.0 ng/mL Final     Comment:     Vitamin D guidelines established by Clinical Guidelines Subcommittee  of the Endocrine Society Task Force, 2011    Deficiency <20ng/ml   Insufficiency 20-30ng/ml   Sufficient  ng/ml

## 2025-01-29 NOTE — ASSESSMENT & PLAN NOTE
Antiobesity Medications/Medical --class II obesity BMI of 36.3 with hyperlipidemia  -Patient reports that injectable GLP-1 medications Qsymia and Contrave were not covered by her insurance  -Discussed 2 options were either using phentermine and Topamax or naltrexone bupropion  -Her current blood pressure today was elevated at 140/90.  We decided against initiating phentermine at this office visit.  A twelve-lead EKG will be ordered  -We will initiate Topamax which might help improve her blood pressure.  Plan to initiate phentermine next office visit if systolic blood pressure less than 140 and diastolic blood pressure less than 80 and heart rate between 70-80  -No contraindications to metformin Wellbutrin naltrexone    Nutrition: start the day with a protein rich breakfast-consider meal replacement protein shakes instead of carbohydrate rich foods  -Substitute healthy snacks for processed foods-list provided  -Aim for 20 to 30 g of protein with meals and around 10 g with snacks.  Specifics to be provided by the dietitian  -Patient would like to work with a dietitian through the nutrition bundles  -Monitor liquid calories  -Monitor excess calories from eating out    Physical Activity: Patient has a [employer] membership that she plans on using.  If she would not like to go there, suggested YouTube videos for resistance band floor workouts wall Pilates body weight exercises and even cardio routines at home RB cardio     Sleep: -STOP-BANG 2/8; 8 to 9 hours of sleep at night    Food Behaviors/Stress: Patient attest to emotional eating and some snacking and grazing.  Informed her of some virtual support groups that could guide these food behaviors

## 2025-01-29 NOTE — TELEPHONE ENCOUNTER
PA desvenlafaxine (PRISTIQ) 100 mg SUBMITTED     to [facility]     via    [x]CMM-KEY: [id number]  []Surescripts-Case ID #    []Availity-Auth ID #  NDC #    []Faxed to plan   []Other website    []Phone call Case ID #      []PA sent as URGENT    All office notes, labs and other pertaining documents and studies sent. Clinical questions answered. Awaiting determination from insurance company.     Turnaround time for your insurance to make a decision on your Prior Authorization can take 7-21 business days.

## 2025-01-30 RX ORDER — PREDNISONE 10 MG/1
TABLET ORAL
Qty: 70 TABLET | Refills: 0 | Status: SHIPPED | OUTPATIENT
Start: 2025-01-30 | End: 2025-02-27

## 2025-01-30 NOTE — TELEPHONE ENCOUNTER
----- Message from Kacey Duran PA-C sent at 1/30/2025  9:21 AM EST -----  Hello! I ordered stool studies for pt while on tasks. Infectious stool studies WNL. Fecal rosetta mildly elevated at 289..    Clinical: please call pt and see how she is feeling?    Dr gordon: FYI: if pt is still symptomatic, would you like steroids sent in?    Thanks, all!

## 2025-01-30 NOTE — TELEPHONE ENCOUNTER
PA desvenlafaxine (PRISTIQ) 100 mg DENIED    Reason:(Screenshot if applicable)        Message sent to office clinical pool Yes    Denial letter scanned into Media Yes    Appeal started No (Provider will need to decide if appeal is warranted and send clinical documentation to Prior Authorization Team for initiation.)    **Please follow up with your patient regarding denial and next steps**

## 2025-04-16 ENCOUNTER — CONSULT (OUTPATIENT)
Dept: DERMATOLOGY | Facility: CLINIC | Age: 30
End: 2025-04-16
Payer: COMMERCIAL

## 2025-04-16 DIAGNOSIS — J32.9 CHRONIC CONGESTION OF PARANASAL SINUS: ICD-10-CM

## 2025-04-16 DIAGNOSIS — D22.9 MELANOCYTIC NEVUS, UNSPECIFIED LOCATION: ICD-10-CM

## 2025-04-16 DIAGNOSIS — L70.9 ACNE, UNSPECIFIED ACNE TYPE: Primary | ICD-10-CM

## 2025-04-16 DIAGNOSIS — E66.812 CLASS 2 SEVERE OBESITY DUE TO EXCESS CALORIES WITH SERIOUS COMORBIDITY AND BODY MASS INDEX (BMI) OF 36.0 TO 36.9 IN ADULT (HCC): ICD-10-CM

## 2025-04-16 DIAGNOSIS — K50.119 CROHN'S DISEASE OF COLON WITH COMPLICATION (HCC): ICD-10-CM

## 2025-04-16 DIAGNOSIS — L85.3 XEROSIS OF SKIN: ICD-10-CM

## 2025-04-16 DIAGNOSIS — B34.9 VIRAL SYNDROME: ICD-10-CM

## 2025-04-16 DIAGNOSIS — E66.01 CLASS 2 SEVERE OBESITY DUE TO EXCESS CALORIES WITH SERIOUS COMORBIDITY AND BODY MASS INDEX (BMI) OF 36.0 TO 36.9 IN ADULT (HCC): ICD-10-CM

## 2025-04-16 DIAGNOSIS — T75.3XXA SEVERE MOTION SICKNESS, INITIAL ENCOUNTER: ICD-10-CM

## 2025-04-16 DIAGNOSIS — R06.2 WHEEZING: ICD-10-CM

## 2025-04-16 DIAGNOSIS — L81.4 LENTIGO: ICD-10-CM

## 2025-04-16 DIAGNOSIS — D18.01 CHERRY ANGIOMA: ICD-10-CM

## 2025-04-16 DIAGNOSIS — D84.9 IMMUNOCOMPROMISED PATIENT (HCC): ICD-10-CM

## 2025-04-16 PROCEDURE — 99204 OFFICE O/P NEW MOD 45 MIN: CPT | Performed by: DERMATOLOGY

## 2025-04-16 RX ORDER — SPIRONOLACTONE 50 MG/1
50 TABLET, FILM COATED ORAL 2 TIMES DAILY
Qty: 180 TABLET | Refills: 2 | Status: SHIPPED | OUTPATIENT
Start: 2025-04-16

## 2025-04-16 RX ORDER — TRETINOIN 0.25 MG/G
CREAM TOPICAL
Qty: 45 G | Refills: 11 | Status: SHIPPED | OUTPATIENT
Start: 2025-04-16

## 2025-04-16 RX ORDER — CLINDAMYCIN PHOSPHATE 11.9 MG/ML
SOLUTION TOPICAL
Qty: 60 ML | Refills: 11 | Status: SHIPPED | OUTPATIENT
Start: 2025-04-16

## 2025-04-16 NOTE — PROGRESS NOTES
"[facility] Note     Patient Name: Elena Harmon  Encounter Date: 04/16/2025       Have you been cared for by a [facility] Dermatologist in the last 3 years and, if so, which description applies to you? NO. I am considered a \"new\" patient and must complete all patient intake questions. I am of child-bearing potential.     REVIEW OF SYSTEMS:  Have you recently had or currently have any of the following? Recent fever or chills? No  Any non-healing wound? No  Are you pregnant or planning to become pregnant? No  Are you currently or planning to be nursing or breast feeding? No   PAST MEDICAL HISTORY:  Have you personally ever had or currently have any of the following?  If \"YES,\" then please provide more detail. Skin cancer (such as Melanoma, Basal Cell Carcinoma, Squamous Cell Carcinoma?  No  Tuberculosis, HIV/AIDS, Hepatitis B or C: No  Radiation Treatment No   HISTORY OF IMMUNOSUPPRESSION:   Do you have a history of any of the following:  Systemic Immunosuppression such as Diabetes, Biologic or Immunotherapy, Chemotherapy, Organ Transplantation, Bone Marrow Transplantation or Prednsione?  YES, Crohn's disease      Answering \"YES\" requires the addition of the dotphrase \"IMMUNOSUPPRESSED\" as the first diagnosis of the patient's visit.   FAMILY HISTORY:  Any \"first degree relatives\" (parent, brother, sister, or child) with the following?    Skin Cancer, Pancreatic or Other Cancer? YES, mother - unknown skin cancer    PATIENT EXPERIENCE:    Do you want the Dermatologist to perform a COMPLETE skin exam today including a clinical examination under the \"bra and underwear\" areas?  Yes  If necessary, do we have your permission to call and leave a detailed message on your Preferred Phone number that includes your specific medical information?  Yes      Allergies   Allergen Reactions    Penicillins Rash      Current Outpatient Medications:     albuterol (ProAir HFA) 90 mcg/act inhaler, Inhale 2 puffs every 6 " (six) hours as needed for wheezing (Patient not taking: Reported on 1/29/2025), Disp: 8.5 g, Rfl: 0    desvenlafaxine (PRISTIQ) 100 mg 24 hr tablet, TAKE 1 TABLET (100 MG TOTAL) BY MOUTH DAILY, Disp: 30 tablet, Rfl: 5    Drospirenone 4 MG TABS, Take 1 tablet by mouth daily, Disp: 84 tablet, Rfl: 4    montelukast (SINGULAIR) 10 mg tablet, Take 1 tablet (10 mg total) by mouth daily at bedtime, Disp: 90 tablet, Rfl: 1    Naltrexone-buPROPion HCl ER 8-90 MG TB12, Take 1 tab bid for one week, then increase to 2 po bid thereafter (Patient not taking: Reported on 10/28/2024), Disp: 120 tablet, Rfl: 2    ondansetron (ZOFRAN-ODT) 4 mg disintegrating tablet, Take 1 tablet (4 mg total) by mouth every 6 (six) hours as needed for nausea or vomiting, Disp: 20 tablet, Rfl: 0    scopolamine (TRANSDERM-SCOP) 1 mg/3 days TD 72 hr patch, Place 1 patch on the skin over 72 hours every third day, Disp: 10 patch, Rfl: 0    topiramate (Topamax) 25 mg tablet, Take 1 tablet 20 minutes before evening meal and after 1 week increase to 1 tablet twice a day before meals, Disp: 60 tablet, Rfl: 3    triamcinolone (KENALOG) 0.1 % cream, Apply topically 2 (two) times a day (Patient not taking: Reported on 10/28/2024), Disp: 15 g, Rfl: 4    Upadacitinib ER (Rinvoq) 30 MG TB24, Take 30 mg by mouth daily, Disp: 30 tablet, Rfl: 11              Whom besides the patient is providing clinical information about today's encounter?   NO ADDITIONAL HISTORIAN (patient alone provided history)    Physical Exam and Assessment/Plan by Diagnosis:      ACNE VULGARIS    Physical Exam:  Anatomic Locations Involved: Face, jaw   Global Assessment: MODERATE:  MORE THAN half the face is involved. Many comedones, papules and/or pustules. One nodule may be present.  Scarring Present? Yes; Atrophic scarring:  MODERATE  Pertinent Positives:  Pertinent Negatives:    Additional History of Present Condition:  patient is present for acne, patient has been recently getting cystic  "acne. Patient states cystic acne start around when she start topiramate. Patient is currently using a pan oxyl spot treatment.    Given medical history would not recommend oral antibiotics or Accutane    TODAY'S PLAN:     PRESCRIPTION MANAGEMENT:  Several treatment options were discussed including topical retinoids and their side effects.     Skin Hygiene:      Wash affected areas (face, chest, and back) TWICE A DAY with a mild cleanser such as cervae.  Use only mild cleansers (hypoallergenic and without fragrances) and fragrance free detergent (not \"unscented\" products which contain a masking agent); we discussed avoiding irritants/fragranced products.  Apply a good oil-free facial moisturizer AT LEAST TWO TIMES A DAY \" such as cervae.  Minimize the application of oils and cosmetics to the affected skin.  This includes HAIR PRODUCTS such as \"leave in\" conditioners.  Unless the product specifically states that it \"won't cause acne,\" \"won't clog pores,\" and/or \"is non-comedogenic\" then it may actually CAUSE acne.  If you smoke, STOP. Nicotine increases sebum retention and increased scale within the follicles, forming comedones (blackheads and whiteheads).  Abrasive treatments such as dermabrasion and spa facials may aggravate inflammatory acne.  Do NOT scratch or pick your acne bumps.  The evidence that diet directly affects acne remains weak.  However, diet does affect your overall health.  Eat plenty of fresh fruit and vegetables.  Avoid protein or amino acid supplements, particularly if they contain leucine. Consider a low-glycemic, low-protein and low-dairy diet.  Be mindful that certain medications may cause of aggravate acne.  Make sure to tell your Dermatologist if you start a new prescription, nutritional supplement, and/or herbal remedy.      MORNING Topical Regimen:      Benzoyl Peroxide Wash:  Apply to skin while in shower/bath; gently lather into affected areas; leave on for 2-3 minutes; then, rinse " "completely.  Clindamycin 1% solution IN THE MORNING:  After gently washing and drying your skin, apply this TOPICAL medication evenly over your entire face, avoiding the eyes and corners of the mouth      EVENING Topical Regimen:      Tretinoin 0.025% cream AT LEAST 1 HOUR BEFORE BEDTIME:  Evenly spread a SINGLE pea-sized amount of this medication over your entire face, avoiding the eyes and corners of the mouth.Use a pea sized amount to face at night. Start slowly because it will irritate your skin. Start one night a week for a couple weeks, then 2 nights a weeks for a couple weeks, and slowly increase to nightly.       SYSTEMIC Strategies:      Hormonal Therapy:  Spironolactone 50 mg TWICE A DAY  Start once daily for a week or two then can increase to twice daily       MEDICAL DECISION MAKING  Treatment Goal:  Resolution of the CHRONIC condition.       Chronic condition is NOT at treatment goal.  It is progressing along its expected course OR is poorly-controlled.           MELANOCYTIC NEVI (\"Moles\")    Physical Exam:  Anatomic Location Affected:   Mostly on sun-exposed areas of the trunk and extremities  Morphological Description:  Scattered, 1-4mm round to ovoid, symmetrical-appearing, even bordered, skin colored to dark brown macules/papules, mostly in sun-exposed areas  Pertinent Positives:  Pertinent Negatives:    Additional History of Present Condition:      Assessment and Plan:  Based on a thorough discussion of this condition and the management approach to it (including a comprehensive discussion of the known risks, side effects and potential benefits of treatment), the patient (family) agrees to implement the following specific plan:  When outside we recommend using a wide brim hat, sunglasses, long sleeve and pants, sunscreen with SPF 30+ with reapplication every 2 hours, or SPF specific clothing   Benign, reassured  Annual skin check     Melanocytic Nevi  Melanocytic nevi (\"moles\") are tan or brown, " "raised or flat areas of the skin which have an increased number of melanocytes. Melanocytes are the cells in our body which make pigment and account for skin color.    Some moles are present at birth (I.e., \"congenital nevi\"), while others come up later in life (i.e., \"acquired nevi\").  The sun can stimulate the body to make more moles.  Sunburns are not the only thing that triggers more moles.  Chronic sun exposure can do it too.     Clinically distinguishing a healthy mole from melanoma may be difficult, even for experienced dermatologists. The \"ABCDE's\" of moles have been suggested as a means of helping to alert a person to a suspicious mole and the possible increased risk of melanoma.  The suggestions for raising alert are as follows:    Asymmetry: Healthy moles tend to be symmetric, while melanomas are often asymmetric.  Asymmetry means if you draw a line through the mole, the two halves do not match in color, size, shape, or surface texture. Asymmetry can be a result of rapid enlargement of a mole, the development of a raised area on a previously flat lesion, scaling, ulceration, bleeding or scabbing within the mole.  Any mole that starts to demonstrate \"asymmetry\" should be examined promptly by a board certified dermatologist.     Border: Healthy moles tend to have discrete, even borders.  The border of a melanoma often blends into the normal skin and does not sharply delineate the mole from normal skin.  Any mole that starts to demonstrate \"uneven borders\" should be examined promptly by a board certified dermatologist.     Color: Healthy moles tend to be one color throughout.  Melanomas tend to be made up of different colors ranging from dark black, blue, white, or red.  Any mole that demonstrates a color change should be examined promptly by a board certified dermatologist.     Diameter: Healthy moles tend to be smaller than 0.6 cm in size; an exception are \"congenital nevi\" that can be larger.  Melanomas " "tend to grow and can often be greater than 0.6 cm (1/4 of an inch, or the size of a pencil eraser). This is only a guideline, and many normal moles may be larger than 0.6 cm without being unhealthy.  Any mole that starts to change in size (small to bigger or bigger to smaller) should be examined promptly by a board certified dermatologist.     Evolving: Healthy moles tend to \"stay the same.\"  Melanomas may often show signs of change or evolution such as a change in size, shape, color, or elevation.  Any mole that starts to itch, bleed, crust, burn, hurt, or ulcerate or demonstrate a change or evolution should be examined promptly by a board certified dermatologist.        LENTIGO    Physical Exam:  Anatomic Location Affected:  trunk, arms  Morphological Description:  Light brown macules  Pertinent Positives:  Pertinent Negatives:    Additional History of Present Condition:      Assessment and Plan:  Based on a thorough discussion of this condition and the management approach to it (including a comprehensive discussion of the known risks, side effects and potential benefits of treatment), the patient (family) agrees to implement the following specific plan:  When outside we recommend using a wide brim hat, sunglasses, long sleeve and pants, sunscreen with SPF 30+ with reapplication every 2 hours, or SPF specific clothing       What is a lentigo?  A lentigo is a pigmented flat or slightly raised lesion with a clearly defined edge. Unlike an ephelis (freckle), it does not fade in the winter months. There are several kinds of lentigo.  The name lentigo originally referred to its appearance resembling a small lentil. The plural of lentigo is lentigines, although “lentigos” is also in common use.    Who gets lentigines?  Lentigines can affect males and females of all ages and races. Solar lentigines are especially prevalent in fair skinned adults. Lentigines associated with syndromes are present at birth or arise during " "childhood.    What causes lentigines?  Common forms of lentigo are due to exposure to ultraviolet radiation:  Sun damage including sunburn   Indoor tanning   Phototherapy, especially photochemotherapy (PUVA)    Ionizing radiation, eg radiation therapy, can also cause lentigines.  Several familial syndromes associated with widespread lentigines originate from mutations in Sammy-MAP kinase, mTOR signaling and PTEN pathways.    What is the treatment for lentigines?  Most lentigines are left alone. Attempts to lighten them may not be successful. The following approaches are used:  SPF 50+ broad-spectrum sunscreen   Hydroquinone bleaching cream   Alpha hydroxy acids   Vitamin C   Retinoids   Azelaic acid   Chemical peels  Individual lesions can be permanently removed using:  Cryotherapy   Intense pulsed light   Pigment lasers    How can lentigines be prevented?  Lentigines associated with exposure ultraviolet radiation can be prevented by very careful sun protection. Clothing is more successful at preventing new lentigines than are sunscreens.    What is the outlook for lentigines?  Lentigines usually persist. They may increase in number with age and sun exposure. Some in sun-protected sites may fade and disappear.    MOSER ANGIOMAS    Physical Exam:  Anatomic Location Affected:  trunk  Morphological Description:  Scattered cherry red, 1-4 mm papules.  Pertinent Positives:  Pertinent Negatives:    Additional History of Present Condition:      Assessment and Plan:  Based on a thorough discussion of this condition and the management approach to it (including a comprehensive discussion of the known risks, side effects and potential benefits of treatment), the patient (family) agrees to implement the following specific plan:  Monitor for changes  Benign, reassured      Assessment and Plan:    Cherry angioma, also known as Parikh de José Luis spots, are benign vascular skin lesions. A \"cherry angioma\" is a firm red, blue or " "purple papule, 0.1-1 cm in diameter. When thrombosed, they can appear black in colour until evaluated with a dermatoscope when the red or purple colour is more easily seen. Cherry angioma may develop on any part of the body but most often appear on the scalp, face, lips and trunk.  An angioma is due to proliferating endothelial cells; these are the cells that line the inside of a blood vessel.    Angiomas can arise in early life or later in life; the most common type of angioma is a cherry angioma.  Cherry angiomas are very common in males and females of any age or race. They are more noticeable in white skin than in skin of colour. They markedly increase in number from about the age of 40. There may be a family history of similar lesions. Eruptive cherry angiomas have been rarely reported to be associated with internal malignancy. The cause of angiomas is unknown. Genetic analysis of cherry angiomas has shown that they frequently carry specific somatic missense mutations in the GNAQ and GNA11 (Q209H) genes, which are involved in other vascular and melanocytic proliferations.      XEROSIS (\"DRY SKIN\")    Physical Exam:  Anatomic Location Affected:  diffuse  Morphological Description:  xerosis  Pertinent Positives:  Pertinent Negatives:    Additional History of Present Condition:      Assessment and Plan:  Based on a thorough discussion of this condition and the management approach to it (including a comprehensive discussion of the known risks, side effects and potential benefits of treatment), the patient (family) agrees to implement the following specific plan:  Use moisturizer like Eucerin,Cerave or Aveeno Cream 3 times a day for the dry skin            Dry skin refers to skin that feels dry to touch. Dry skin has a dull surface with a rough, scaly quality. The skin is less pliable and cracked. When dryness is severe, the skin may become inflamed and fissured.  Although any body site can be dry, dry skin tends to " affect the shins more than any other site.    Dry skin is lacking moisture in the outer horny cell layer (stratum corneum) and this results in cracks in the skin surface.  Dry skin is also called xerosis, xeroderma or asteatosis (lack of fat).  It can affect males and females of all ages. There is some racial variability in water and lipid content of the skin.  Dry skin that starts in early childhood may be one of about 20 types of ichthyosis (fish-scale skin). There is often a family history of dry skin.   Dry skin is commonly seen in people with atopic dermatitis.  Nearly everyone > 60 years has dry skin.    Dry skin that begins later may be seen in people with certain diseases and conditions.  Postmenopausal women  Hypothyroidism  Chronic renal disease   Malnutrition and weight loss   Subclinical dermatitis   Treatment with certain drugs such as oral retinoids, diuretics and epidermal growth factor receptor inhibitors      What is the treatment for dry skin?  The mainstay of treatment of dry skin and ichthyosis is moisturisers/emollients. They should be applied liberally and often enough to:  Reduce itch   Improve the barrier function   Prevent entry of irritants, bacteria   Reduce transepidermal water loss.      How can dry skin be prevented?  Eliminate aggravating factors:  Reduce the frequency of bathing.   A humidifier in winter and air conditioner in summer   Compare having a short shower with a prolonged soak in a bath.   Use lukewarm, not hot, water.   Replace standard soap with a substitute such as a synthetic detergent cleanser, water-miscible emollient, bath oil, anti-pruritic tar oil, colloidal oatmeal etc.   Apply an emollient liberally and often, particularly shortly after bathing, and when itchy. The drier the skin, the thicker this should be, especially on the hands.    What is the outlook for dry skin?  A tendency to dry skin may persist life-long, or it may improve once contributing factors are  controlled.     Scribe Attestation      I,:  Meena Whitt am acting as a scribe while in the presence of the attending physician.:       I,:  Irma Barraza MD personally performed the services described in this documentation    as scribed in my presence.:

## 2025-04-18 RX ORDER — ALBUTEROL SULFATE 90 UG/1
2 INHALANT RESPIRATORY (INHALATION) EVERY 6 HOURS PRN
Qty: 8.5 G | Refills: 0 | Status: SHIPPED | OUTPATIENT
Start: 2025-04-18

## 2025-04-18 RX ORDER — MONTELUKAST SODIUM 10 MG/1
10 TABLET ORAL
Qty: 90 TABLET | Refills: 1 | Status: SHIPPED | OUTPATIENT
Start: 2025-04-18

## 2025-04-18 RX ORDER — TOPIRAMATE 25 MG/1
TABLET, FILM COATED ORAL
Qty: 60 TABLET | Refills: 0 | Status: SHIPPED | OUTPATIENT
Start: 2025-04-18

## 2025-04-18 RX ORDER — ONDANSETRON 4 MG/1
4 TABLET, ORALLY DISINTEGRATING ORAL EVERY 6 HOURS PRN
Qty: 20 TABLET | Refills: 0 | Status: SHIPPED | OUTPATIENT
Start: 2025-04-18

## 2025-04-18 RX ORDER — SCOPOLAMINE 1 MG/3D
1 PATCH, EXTENDED RELEASE TRANSDERMAL
Qty: 10 PATCH | Refills: 0 | Status: SHIPPED | OUTPATIENT
Start: 2025-04-18

## 2025-05-01 ENCOUNTER — OFFICE VISIT (OUTPATIENT)
Age: 30
End: 2025-05-01

## 2025-05-01 VITALS
TEMPERATURE: 97.9 F | BODY MASS INDEX: 34.2 KG/M2 | DIASTOLIC BLOOD PRESSURE: 72 MMHG | SYSTOLIC BLOOD PRESSURE: 120 MMHG | OXYGEN SATURATION: 99 % | WEIGHT: 217.9 LBS | HEART RATE: 94 BPM | HEIGHT: 67 IN

## 2025-05-01 DIAGNOSIS — D84.9 IMMUNOCOMPROMISED PATIENT (HCC): ICD-10-CM

## 2025-05-01 DIAGNOSIS — K50.119 CROHN'S DISEASE OF COLON WITH COMPLICATION (HCC): Primary | ICD-10-CM

## 2025-05-01 NOTE — PROGRESS NOTES
Gastroenterology Outpatient Follow-up - Crohn's Disease  Elena Harmon 30 y.o. female MRN: [id number]  Encounter: [id number]    Elena Harmon is a 30 y.o. female with Crohn's disease.    Symptom onset:     Diagnosis:  Crohns disease  Year of diagnosis:  2019    IBD Summary: Elena Harmon was diagnosed with ileocolonic Crohn's disease in 2019.  She was originally treated with adalimumab but had inadequate levels despite twice weekly dosing.  She was transitioned to infliximab and did well with induction dosing but had active disease and low infliximab levels on maintenance dosing despite increased dosing interval.  She is now maintained on upadacitinib 30 mg daily.    CD Summary  Current Crohn's disease phenotype:  inflammatory and non-penetrating and non-stricturing    Involved sites since disease onset   Esophagus: no   Stomach: no     Duodenum: no   Jejunum: no   Ileum: yes   Right colon: unknown   Transverse colon: unknown   Left colon: unknown   Rectum: yes   Anus: no     History of stricture  Esophagus: no   Stomach: no   Duodenum: no   Jejunum: no   Ileum: no   Right colon: no   Transverse colon: no   Left colon: no   Rectum: no   Anus: no     History of fistula other than perianal:  Intra-abdominal   abcess: no      Enteroenteric fistula: no      Enterocutaneous fistula: no      Enterovesical fistula: no      Other fistula: no            Surgical History  Number of IBD surgeries: 0      First IBD surgery:    Most Recent IBD surgery:    Esophageal:  0    Gastroduodenal:  0    Small bowel resection(s):  0    Ileocolonic resection(s): 0      Colonic resection(s):  0    Ileostomy or colostomy:  no previous ostomy    Complete colectomy:  No         Medications     Year last used Reason for discontinuation   Corticosteroids prior           Thiopurines   never         Methotrexate   never         Infliximab prior     GA     Adalimumab prior     NALLELY undetectable drug levels despite twice weekly dosing    Certolizumab   never         Golimumab   never         Natalizumab   never         Mesalamine   never         Sulfasalzine   never         Vedolizumab   never         Ustekinumab   never         Tofacitinib   never         Other biologic                 Extraintestinal Manifestations    IBD-associated arthropathy No    Uveitis No    Oral aphthous ulcers No   Erythema nodosum No    Pyoderma gangrenosum No    Primary sclerosing cholangitis No    Thrombotic complications No          Cancer / Dysplasia History  History of IBD-associated dysplasia: none    Date of diagnosis (Year):     History of colorectal cancer: No   History of cervical dysplasia: No   History of skin cancer: none         Laboratory Data   Most recent (date) Result   PPD       Quanitferon gold       TPMT       Hepatitis A       HBsAb       HBcAb       HBsAg       HCV Ab           Imaging / Diagnostic Procedures   Most recent (date) Findings   Colonoscopy or sigmoidoscopy #1 8/23/2024            Ulcers/Erosions  small           Strictures  none           Stricture Severity  N/A           Endoscopic Score Simmons Score:    Rutgeert's:  N/A            Findings  1) The terminal ileum, cecum, ascending colon, transverse colon, descending colon, sigmoid colon and rectosigmoid appeared normal. Performed random biopsy using biopsy forceps.  2) Moderate erythematous, granular and ulcerated mucosa in the rectum; performed cold forceps biopsy           Pathology  A. Terminal Ileum, hx of Crohn's, biopsy:  - Benign, unremarkable small bowel mucosa.  - No chronic or active ileitis.    -- No granulomas or organism.     -- No villous atrophy and no intraepithelial lymphocytosis.  - No glandular dysplasia and no evidence of malignancy.         B. Ileocecal valve, hx of Crohn's, biopsy:  - Benign colonic mucosa with non-specific reactive changes.  - No active or chronic colitis.    -- No granulomas or organism.   - No glandular dysplasia and no evidence of malignancy.        C. Colon, hx of Crohn's @ 80, biopsy:  - Active chronic colitis with mild activity.    -- Minimal architectural disorder; no granulomas or organism.  - No glandular dysplasia and no evidence of malignancy.       D. Colon, hx of Crohn's @ 70, biopsy:  - Focal active colitis; no chronic colitis.    -- No granulomas or organism.   - No glandular dysplasia and no evidence of malignancy.           E. Colon, hx of Crohn's @ 60, biopsy:  - Benign colonic mucosa with non-specific reactive changes.  - No active or chronic colitis.    -- No granulomas or organism.   - No glandular dysplasia and no evidence of malignancy.      F. Colon, hx of Crohn's @ 50, biopsy:  - Active chronic colitis with mild activity.    -- Minimal architectural disorder; no granulomas or organism.  - No glandular dysplasia and no evidence of malignancy.        G. Colon, hx of Crohn's @ 40, biopsy:  - Chronic quiescent colitis; no activity.    -- Focal paneth cell metaplasia; no granulomas or organism.   - No glandular dysplasia and no evidence of malignancy.       H. Colon, hx of Crohn's @ 30, biopsy:  - Chronic quiescent colitis; no activity.    -- Focal glandular architectural disorder and paneth cell metaplasia;        no granulomas or organism.   - No glandular dysplasia and no evidence of malignancy.       I. Colon, hx of Crohn's @ 20, biopsy:  - Active chronic colitis with mild activity.    -- No granulomas or organism.   - No glandular dysplasia and no evidence of malignancy.      J. Colon, hx of Crohn's @ 11, biopsy:  - Active chronic colitis with mild to moderate activity.    -- No granulomas.    -- Cytomegalovirus (CMV) immunostain pending; result will be reported         in an addendum.   - No glandular dysplasia and no evidence of malignancy.      Cytomegalovirus (CMV) immunostain performed on specimen J is negative   Colonoscopy or sigmoidoscopy #2              Ulcers/Erosions                 Strictures                 Stricture  Severity              Endoscopic Score Simmons Score:    Rugeert's:              Findings              Pathology      EGD       Small bowel follow-through       CT enterography       CT without enterography       MRI enterography       Capsule study       DEXA scan   Lowest Z-score:      CXR (for TB)           HPI:   Interval History:  5/1/2025 Follow up  Patient is transitioning care from Dr. Heart.  She is currently on upadacitinib 30 mg daily.  She is feeling well overall.  She typically has 1-2 soft/formed BMs daily with no abdominal pain, blood in the stool, or urgency.  She just recently established care with dermatology and had skin cancer screening evaluation.  She is up to date with influenza vaccine.  Has not yet had the Shingrix vaccination series.  She stays up to date with Pap smears.  No history of tobacco use.  She recently started taking a multivitamin.  She is working on weight loss and has lost around 20 lb so far.    Labs 1/2025 reviewed.  CBC and CMP normal.  CRP mildly elevated 3.4.  Vitamin B12 normal, 411.  Vitamin D level, 21.4.  Iron panel normal with ferritin 44.  Infectious stool tests negative.  Fecal calprotectin elevated, 289.    Elena reports the following symptoms over the last 7 days:    Stool Frequency normal   Average stools per day: 2   Average liquid stools per day: 0   Consistency of bowel: soft or semi-formed   Awakening from sleep to move bowels? No   Urgency no fecal urgency   Visible blood in stool? none   Abdominal pain? none   General Wellbeing? generally well     Elena also reports the following symptoms in the last month:    Leakage of stool while sleeping? No   Leakage of stool while awake? No       REVIEW OF SYSTEMS:  During the last 7 days, Elena experienced the following symptoms:  Unintentional Weight Loss (in the last month) No  Comment:intentionally lost 20 lb so far   Fever No   Eye irritation No   Mouth sores No   Sore throat Yes  Comment:allergies  "  Chest pain No   Shortness of breath No   Numbness or tingling in hands or feet No   Skin rash No   Pain or swelling in joints No   Bruising or bleeding No   Felt depressed or blue No   Fatigue Yes   Dysuria No   Please see HPI for additional pertinent review of systems; otherwise remainder of ROS was unremarkable    MEDICATIONS:    Current Outpatient Medications:   •  albuterol (ProAir HFA) 90 mcg/act inhaler  •  Drospirenone 4 MG TABS  •  montelukast (SINGULAIR) 10 mg tablet  •  ondansetron (ZOFRAN-ODT) 4 mg disintegrating tablet  •  scopolamine (TRANSDERM-SCOP) 1 mg/3 days TD 72 hr patch  •  topiramate (Topamax) 25 mg tablet  •  tretinoin (RETIN-A) 0.025 % cream  •  Upadacitinib ER (Rinvoq) 30 MG TB24  •  Zoster Vac Recomb Adjuvanted 50 MCG/0.5ML SUSR  •  clindamycin (CLEOCIN T) 1 % external solution  •  desvenlafaxine (PRISTIQ) 100 mg 24 hr tablet  •  Naltrexone-buPROPion HCl ER 8-90 MG TB12  •  spironolactone (ALDACTONE) 50 mg tablet  •  triamcinolone (KENALOG) 0.1 % cream    ALLERGIES:  Allergies   Allergen Reactions   • Penicillins Rash       OBJECTIVE:  /72 (BP Location: Right arm, Patient Position: Sitting, Cuff Size: Large)   Pulse 94   Temp 97.9 °F (36.6 °C) (Tympanic)   Ht 5' 7\" (1.702 m)   Wt 98.8 kg (217 lb 14.4 oz)   SpO2 99%   BMI 34.13 kg/m²      PHYSICAL EXAM:    General Appearance:   Alert, cooperative, no distress   HEENT:   Normocephalic, atraumatic, anicteric.     Neck:  Supple, symmetrical, trachea midline   Lungs:   Respirations unlabored    Extremities:  No cyanosis, clubbing or edema    Skin:  No jaundice, rashes, or lesions      Lab Results   Component Value Date    WBC 7.39 01/22/2025    HGB 12.9 01/22/2025    HCT 36.9 01/22/2025    MCV 86 01/22/2025     01/22/2025     Lab Results   Component Value Date    SODIUM 138 01/22/2025    K 4.0 01/22/2025     01/22/2025    CO2 25 01/22/2025    AGAP 10 01/22/2025    BUN 13 01/22/2025    CREATININE 0.70 01/22/2025    GLUC " 108 05/01/2024    GLUF 89 01/22/2025    CALCIUM 9.4 01/22/2025    AST 17 01/22/2025    ALT 21 01/22/2025    ALKPHOS 56 01/22/2025    TP 7.3 01/22/2025    TBILI 0.45 01/22/2025    EGFR 118 01/22/2025     Lab Results   Component Value Date    CRP 3.4 (H) 01/22/2025     Lab Results   Component Value Date    HQNJZCPJ30 411 01/22/2025     Lab Results   Component Value Date    FERRITIN 44 01/22/2025       ASSESSMENT AND PLAN:    Elena has a history of inflammatory, non-penetrating, non-stricturing Crohn’s disease diagnosed in 2019 with involvement in the following areas:      Ileum,    Rectum,  without perianal fistula . Surgical history includes no small bowel resections, no colon resections, and no prior ostomy. Current medical therapy is with upadacitinib. My global assessment is that the clinical disease activity is currently quiescent.    The 6-point Simmons score was 0 and the 9-point Simmons score was 0.  The short CDAI was 44.      Elena Harmon was diagnosed with ileocolonic Crohn's disease in 2019.  Previous therapy includes infliximab and adalimumab.  She is now maintained on upadacitinib 30 mg daily and in clinical remission.  She also has a history of vitamin D and iron deficiency.    1.  Continue upadacitinib 30 mg once daily.  2.  Recommend Shingrix.  Sent to pharmacy.  3.  Check CBC, CMP, CRP.  4.  Check lipid panel.  5.  Recheck vitamin B12, vitamin D, and iron levels.  6.  Update quant gold and chronic hepatitis panel.  7.  Stay up to date with Pap smear.  8.  Follow up with dermatology once annually.  9.  Stay up to date with routine vaccinations.    Follow up in 6 months.     Health Maintenance Recommendations:  Vaccines & Infections  COVID-19 vaccination and boosters are recommended. There is no evidence that the COVID-19 vaccine would cause an IBD flare.  Avoid live vaccines if on immunosuppressive therapy.  Yearly influenza vaccine (flu shot).  Pneumonia vaccines for patients on immunosuppression.  These include Prevnar 20, followed by Pneumovax 23 at least 8 weeks later.  Shingrix vaccine (series of 2 injections) for al patients 65 and older. Patients on tofacitinib or upadacitinib should be vaccinated regardless of age.  If not immune to measles mumps or rubella, MMR vaccine is recommended. However, this is a live vaccine and should be given prior to immunosuppressive therapy.  HPV vaccination as per national guidelines.  Hepatitis A and B vaccinations if not previously vaccinated.  Testing for tuberculosis with QuantiFERON Gold blood test and/or chest xray prior to starting immunosuppressive medications, and then annually    Cancer screening  Dysplasia surveillance for colorectal cancer. Colonoscopy in all patients with extensive colitis (more than 1/3 of the colon involved) who had disease for at least 8 or more years.  Repeat colonoscopy approximately every 12-24 months.  In patients with concurrent primary sclerosing cholangitis, history of dysplasia, or family history of colon cancer, repeat colonoscopy annually.    Females: Pap smear annually for woman on immunosuppression.  Annual dermatologic/skin exam in all patients with IBD, especially those on immunosuppression with thiopurines or MARLENY inhibitors.    Miscellaneous  DEXA scan, once off steroids for 3 months  Depression screening recommended annually  Routine dental and ophthalmology examinations    Problem List Items Addressed This Visit        Digestive    Crohn's disease of colon with complication (HCC) - Primary    Relevant Medications    Zoster Vac Recomb Adjuvanted 50 MCG/0.5ML SUSR    Other Relevant Orders    CBC    Comprehensive metabolic panel    Iron Panel (Includes Ferritin, Iron Sat%, Iron, and TIBC)    Vitamin B12    Vitamin D 25 hydroxy    C-reactive protein    Chronic Hepatitis Panel    Quantiferon TB Gold Plus Assay    Lipid panel       Other    Immunocompromised patient (HCC)    Relevant Medications    Zoster Vac Recomb Adjuvanted  50 MCG/0.5ML JARAD CarpioC

## 2025-06-03 DIAGNOSIS — B34.9 VIRAL SYNDROME: ICD-10-CM

## 2025-06-03 DIAGNOSIS — R06.2 WHEEZING: ICD-10-CM

## 2025-06-04 RX ORDER — ALBUTEROL SULFATE 90 UG/1
2 INHALANT RESPIRATORY (INHALATION) EVERY 6 HOURS PRN
Qty: 18 G | Refills: 0 | Status: SHIPPED | OUTPATIENT
Start: 2025-06-04

## 2025-06-12 ENCOUNTER — TELEPHONE (OUTPATIENT)
Dept: DERMATOLOGY | Facility: CLINIC | Age: 30
End: 2025-06-12

## 2025-06-12 NOTE — TELEPHONE ENCOUNTER
Called pt to reschedule appt on 12/4/25 appt as provider is now on call this week, n/a left v/m with appt info and asked pt to c/b to confirm or r/s. If pt can't do this day then please offer next available at this office or another. Ok per Dr. Barraza to use an urgent spot or a spot of concern due to pt being r/s. Will send [employer] message.

## 2025-06-19 NOTE — TELEPHONE ENCOUNTER
Received [employer] Notification:    This message is to inform you that the patient has not yet read the following message     (Original message erroneously closed.)

## 2025-06-26 NOTE — PROGRESS NOTES
Pt  Denied new symptoms or concerns today  Pt  Tolerated Remicade without adverse event  Future appointments reviewed  AVS declined 
(V5) oriented

## 2025-08-06 ENCOUNTER — OFFICE VISIT (OUTPATIENT)
Dept: FAMILY MEDICINE CLINIC | Facility: CLINIC | Age: 30
End: 2025-08-06
Payer: COMMERCIAL

## 2025-08-06 PROBLEM — J45.21 MILD INTERMITTENT ASTHMA WITH ACUTE EXACERBATION: Status: ACTIVE | Noted: 2025-08-06

## 2025-08-20 DIAGNOSIS — K50.119 CROHN'S DISEASE OF COLON WITH COMPLICATION (HCC): ICD-10-CM

## 2025-08-21 RX ORDER — UPADACITINIB 30 MG/1
30 TABLET, EXTENDED RELEASE ORAL DAILY
Qty: 30 TABLET | Refills: 3 | Status: SHIPPED | OUTPATIENT
Start: 2025-08-21